# Patient Record
Sex: FEMALE | Race: WHITE | Employment: OTHER | URBAN - METROPOLITAN AREA
[De-identification: names, ages, dates, MRNs, and addresses within clinical notes are randomized per-mention and may not be internally consistent; named-entity substitution may affect disease eponyms.]

---

## 2017-02-23 PROBLEM — I50.9 CHF (CONGESTIVE HEART FAILURE) (HCC): Chronic | Status: ACTIVE | Noted: 2017-02-23

## 2017-02-23 PROBLEM — I50.9 CHF (CONGESTIVE HEART FAILURE) (HCC): Status: ACTIVE | Noted: 2017-02-23

## 2017-02-23 PROBLEM — I10 HYPERTENSION: Status: ACTIVE | Noted: 2017-02-23

## 2017-02-23 PROBLEM — I10 HYPERTENSION: Chronic | Status: ACTIVE | Noted: 2017-02-23

## 2018-03-17 ENCOUNTER — APPOINTMENT (OUTPATIENT)
Dept: GENERAL RADIOLOGY | Age: 83
End: 2018-03-17
Payer: MEDICARE

## 2018-03-17 ENCOUNTER — HOSPITAL ENCOUNTER (EMERGENCY)
Age: 83
Discharge: HOME | End: 2018-03-17
Attending: EMERGENCY MEDICINE
Payer: MEDICARE

## 2018-03-17 VITALS
HEART RATE: 76 BPM | WEIGHT: 122 LBS | SYSTOLIC BLOOD PRESSURE: 110 MMHG | HEIGHT: 55 IN | DIASTOLIC BLOOD PRESSURE: 72 MMHG | BODY MASS INDEX: 28.23 KG/M2 | TEMPERATURE: 98 F | OXYGEN SATURATION: 97 % | RESPIRATION RATE: 20 BRPM

## 2018-03-17 DIAGNOSIS — J10.1 INFLUENZA B: Primary | ICD-10-CM

## 2018-03-17 DIAGNOSIS — I50.9 ACUTE ON CHRONIC CONGESTIVE HEART FAILURE, UNSPECIFIED CONGESTIVE HEART FAILURE TYPE: ICD-10-CM

## 2018-03-17 DIAGNOSIS — N18.9 CHRONIC RENAL FAILURE, UNSPECIFIED CKD STAGE: ICD-10-CM

## 2018-03-17 LAB
ALBUMIN SERPL-MCNC: 3.9 G/DL (ref 3.5–5.2)
ALP BLD-CCNC: 94 U/L (ref 35–104)
ALT SERPL-CCNC: 24 U/L (ref 0–32)
ANION GAP SERPL CALCULATED.3IONS-SCNC: 19 MMOL/L (ref 7–16)
AST SERPL-CCNC: 33 U/L (ref 0–31)
BASOPHILS ABSOLUTE: 0.04 E9/L (ref 0–0.2)
BASOPHILS RELATIVE PERCENT: 0.6 % (ref 0–2)
BILIRUB SERPL-MCNC: 0.9 MG/DL (ref 0–1.2)
BUN BLDV-MCNC: 47 MG/DL (ref 8–23)
CALCIUM SERPL-MCNC: 9.4 MG/DL (ref 8.6–10.2)
CHLORIDE BLD-SCNC: 102 MMOL/L (ref 98–107)
CO2: 20 MMOL/L (ref 22–29)
CREAT SERPL-MCNC: 1.8 MG/DL (ref 0.5–1)
EKG ATRIAL RATE: 88 BPM
EKG Q-T INTERVAL: 374 MS
EKG QRS DURATION: 90 MS
EKG QTC CALCULATION (BAZETT): 434 MS
EKG R AXIS: -151 DEGREES
EKG T AXIS: 59 DEGREES
EKG VENTRICULAR RATE: 81 BPM
EOSINOPHILS ABSOLUTE: 0.08 E9/L (ref 0.05–0.5)
EOSINOPHILS RELATIVE PERCENT: 1.2 % (ref 0–6)
GFR AFRICAN AMERICAN: 32
GFR NON-AFRICAN AMERICAN: 27 ML/MIN/1.73
GLUCOSE BLD-MCNC: 130 MG/DL (ref 74–109)
HCT VFR BLD CALC: 38.2 % (ref 34–48)
HEMOGLOBIN: 12.2 G/DL (ref 11.5–15.5)
IMMATURE GRANULOCYTES #: 0.03 E9/L
IMMATURE GRANULOCYTES %: 0.4 % (ref 0–5)
INFLUENZA A BY PCR: NOT DETECTED
INFLUENZA B BY PCR: DETECTED
INR BLD: 3.1
LYMPHOCYTES ABSOLUTE: 0.76 E9/L (ref 1.5–4)
LYMPHOCYTES RELATIVE PERCENT: 11.4 % (ref 20–42)
MCH RBC QN AUTO: 29.1 PG (ref 26–35)
MCHC RBC AUTO-ENTMCNC: 31.9 % (ref 32–34.5)
MCV RBC AUTO: 91.2 FL (ref 80–99.9)
MONOCYTES ABSOLUTE: 0.57 E9/L (ref 0.1–0.95)
MONOCYTES RELATIVE PERCENT: 8.5 % (ref 2–12)
NEUTROPHILS ABSOLUTE: 5.2 E9/L (ref 1.8–7.3)
NEUTROPHILS RELATIVE PERCENT: 77.9 % (ref 43–80)
PDW BLD-RTO: 16.4 FL (ref 11.5–15)
PLATELET # BLD: 196 E9/L (ref 130–450)
PMV BLD AUTO: 12 FL (ref 7–12)
POTASSIUM SERPL-SCNC: 4.8 MMOL/L (ref 3.5–5)
PRO-BNP: ABNORMAL PG/ML (ref 0–450)
PROTHROMBIN TIME: 35 SEC (ref 9.3–12.4)
RBC # BLD: 4.19 E12/L (ref 3.5–5.5)
SODIUM BLD-SCNC: 141 MMOL/L (ref 132–146)
TOTAL PROTEIN: 6.6 G/DL (ref 6.4–8.3)
TROPONIN: 0.02 NG/ML (ref 0–0.03)
WBC # BLD: 6.7 E9/L (ref 4.5–11.5)

## 2018-03-17 PROCEDURE — 94760 N-INVAS EAR/PLS OXIMETRY 1: CPT

## 2018-03-17 PROCEDURE — 85610 PROTHROMBIN TIME: CPT

## 2018-03-17 PROCEDURE — 80053 COMPREHEN METABOLIC PANEL: CPT

## 2018-03-17 PROCEDURE — 36415 COLL VENOUS BLD VENIPUNCTURE: CPT

## 2018-03-17 PROCEDURE — 83880 ASSAY OF NATRIURETIC PEPTIDE: CPT

## 2018-03-17 PROCEDURE — 71045 X-RAY EXAM CHEST 1 VIEW: CPT

## 2018-03-17 PROCEDURE — 87502 INFLUENZA DNA AMP PROBE: CPT

## 2018-03-17 PROCEDURE — 99284 EMERGENCY DEPT VISIT MOD MDM: CPT

## 2018-03-17 PROCEDURE — 93005 ELECTROCARDIOGRAM TRACING: CPT | Performed by: EMERGENCY MEDICINE

## 2018-03-17 PROCEDURE — 85025 COMPLETE CBC W/AUTO DIFF WBC: CPT

## 2018-03-17 PROCEDURE — 84484 ASSAY OF TROPONIN QUANT: CPT

## 2018-03-17 RX ORDER — OSELTAMIVIR PHOSPHATE 30 MG/1
30 CAPSULE ORAL DAILY
Qty: 5 CAPSULE | Refills: 0 | Status: SHIPPED | OUTPATIENT
Start: 2018-03-17 | End: 2018-03-22

## 2018-03-17 ASSESSMENT — ENCOUNTER SYMPTOMS
NAUSEA: 0
TROUBLE SWALLOWING: 0
RHINORRHEA: 0
WHEEZING: 0
DIARRHEA: 0
COUGH: 0
BLOOD IN STOOL: 0
SORE THROAT: 0
CONSTIPATION: 0
VOMITING: 0
ABDOMINAL PAIN: 0
SHORTNESS OF BREATH: 1
CHEST TIGHTNESS: 0

## 2018-03-17 NOTE — ED PROVIDER NOTES
atelectasis. No focal consolidation, pleural effusion or pneumothorax. Stable enlarged cardiomediastinal silhouette. Stable bones. No acute process. Stable cardiomegaly. ------------------------- NURSING NOTES AND VITALS REVIEWED ---------------------------   The nursing notes within the ED encounter and vital signs as below have been reviewed. /72   Pulse 76   Temp 98 °F (36.7 °C) (Oral)   Resp 20   Ht 4' 7\" (1.397 m)   Wt 122 lb (55.3 kg)   SpO2 97%   BMI 28.36 kg/m²   Oxygen Saturation Interpretation: Normal      ------------------------------------------ PROGRESS NOTES ------------------------------------------  Time: 4:25 PM  Patient is resting comfortably, alert and pleasant during conversation, no acute distress. No increased work of breathing. X-ray has the appearance of pulmonary vascular congestion, likely mild CHF exacerbation. He should also influenza B-positive likely accounting for her fatigue and shortness of breath as well. They did follow up with the primary care physician who increased her Lasix just this past week and is RAD being treated for this. I will start Tamiflu as well, renal dosing. Patient and family feel comfortable caring for the patient at home. Discussed signs and symptoms to prompt immediate return visits to the nearest ER, patient and family without further questions. I have spoken with the patient and family and discussed todays results, in addition to providing specific details for the plan of care and counseling regarding the diagnosis and prognosis. Their questions are answered at this time and they are agreeable with the plan.      --------------------------------- ADDITIONAL PROVIDER NOTES ---------------------------------     DIAGNOSIS:    1. Influenza B    2. Acute on chronic congestive heart failure, unspecified congestive [facility] failure type [facility]    3.  Chronic renal failure, unspecified CKD stage          New Prescriptions    OSELTAMIVIR

## 2018-03-20 ENCOUNTER — OFFICE VISIT (OUTPATIENT)
Dept: INTERNAL MEDICINE CLINIC | Age: 83
End: 2018-03-20
Payer: MEDICARE

## 2018-03-20 VITALS
DIASTOLIC BLOOD PRESSURE: 64 MMHG | RESPIRATION RATE: 16 BRPM | BODY MASS INDEX: 28.23 KG/M2 | HEIGHT: 55 IN | WEIGHT: 122 LBS | SYSTOLIC BLOOD PRESSURE: 97 MMHG | HEART RATE: 82 BPM | TEMPERATURE: 98.2 F

## 2018-03-20 DIAGNOSIS — H65.91 OTITIS MEDIA WITH EFFUSION, RIGHT: ICD-10-CM

## 2018-03-20 DIAGNOSIS — J00 ACUTE NASOPHARYNGITIS: ICD-10-CM

## 2018-03-20 DIAGNOSIS — R53.81 PHYSICAL DECONDITIONING: ICD-10-CM

## 2018-03-20 DIAGNOSIS — M19.90 ARTHRITIS: Chronic | ICD-10-CM

## 2018-03-20 DIAGNOSIS — E55.9 VITAMIN D DEFICIENCY: Primary | ICD-10-CM

## 2018-03-20 DIAGNOSIS — I50.22 CHRONIC SYSTOLIC CONGESTIVE HEART FAILURE (HCC): Chronic | ICD-10-CM

## 2018-03-20 DIAGNOSIS — I50.21 ACUTE SYSTOLIC CONGESTIVE HEART FAILURE (HCC): ICD-10-CM

## 2018-03-20 DIAGNOSIS — I10 ESSENTIAL HYPERTENSION: Chronic | ICD-10-CM

## 2018-03-20 DIAGNOSIS — E78.5 HYPERLIPIDEMIA, UNSPECIFIED HYPERLIPIDEMIA TYPE: Chronic | ICD-10-CM

## 2018-03-20 DIAGNOSIS — J01.00 ACUTE NON-RECURRENT MAXILLARY SINUSITIS: ICD-10-CM

## 2018-03-20 PROCEDURE — G8417 CALC BMI ABV UP PARAM F/U: HCPCS | Performed by: INTERNAL MEDICINE

## 2018-03-20 PROCEDURE — G8427 DOCREV CUR MEDS BY ELIG CLIN: HCPCS | Performed by: INTERNAL MEDICINE

## 2018-03-20 PROCEDURE — 99212 OFFICE O/P EST SF 10 MIN: CPT | Performed by: INTERNAL MEDICINE

## 2018-03-20 PROCEDURE — G8482 FLU IMMUNIZE ORDER/ADMIN: HCPCS | Performed by: INTERNAL MEDICINE

## 2018-03-20 PROCEDURE — 99213 OFFICE O/P EST LOW 20 MIN: CPT | Performed by: INTERNAL MEDICINE

## 2018-03-20 PROCEDURE — 1090F PRES/ABSN URINE INCON ASSESS: CPT | Performed by: INTERNAL MEDICINE

## 2018-03-20 PROCEDURE — 1036F TOBACCO NON-USER: CPT | Performed by: INTERNAL MEDICINE

## 2018-03-20 PROCEDURE — 4040F PNEUMOC VAC/ADMIN/RCVD: CPT | Performed by: INTERNAL MEDICINE

## 2018-03-20 PROCEDURE — 1123F ACP DISCUSS/DSCN MKR DOCD: CPT | Performed by: INTERNAL MEDICINE

## 2018-03-20 RX ORDER — SIMVASTATIN 40 MG
40 TABLET ORAL NIGHTLY
Qty: 30 TABLET | Refills: 3 | Status: SHIPPED | OUTPATIENT
Start: 2018-03-20 | End: 2018-07-31 | Stop reason: SDUPTHER

## 2018-03-20 RX ORDER — TRAMADOL HYDROCHLORIDE 50 MG/1
50 TABLET ORAL EVERY 8 HOURS PRN
Qty: 90 TABLET | Refills: 0 | Status: SHIPPED | OUTPATIENT
Start: 2018-03-20 | End: 2018-04-24 | Stop reason: SDUPTHER

## 2018-03-20 RX ORDER — GUAIFENESIN 600 MG/1
600 TABLET, EXTENDED RELEASE ORAL 2 TIMES DAILY
Qty: 20 TABLET | Refills: 0 | Status: SHIPPED | OUTPATIENT
Start: 2018-03-20 | End: 2018-10-23 | Stop reason: ALTCHOICE

## 2018-03-20 RX ORDER — FLUTICASONE PROPIONATE 50 MCG
1 SPRAY, SUSPENSION (ML) NASAL DAILY
Qty: 1 BOTTLE | Refills: 3 | Status: SHIPPED | OUTPATIENT
Start: 2018-03-20 | End: 2018-10-23 | Stop reason: ALTCHOICE

## 2018-03-20 RX ORDER — CARVEDILOL 3.12 MG/1
TABLET ORAL
Qty: 60 TABLET | Refills: 3 | Status: SHIPPED | OUTPATIENT
Start: 2018-03-20 | End: 2018-07-31 | Stop reason: SDUPTHER

## 2018-03-20 RX ORDER — SPIRONOLACTONE 25 MG/1
25 TABLET ORAL DAILY
Qty: 30 TABLET | Refills: 2 | Status: SHIPPED | OUTPATIENT
Start: 2018-03-20 | End: 2018-07-05 | Stop reason: SDUPTHER

## 2018-03-20 RX ORDER — METHYLPREDNISOLONE 4 MG/1
TABLET ORAL
Qty: 10 TABLET | Refills: 0 | Status: ON HOLD | OUTPATIENT
Start: 2018-03-20 | End: 2018-03-28 | Stop reason: HOSPADM

## 2018-03-20 NOTE — PROGRESS NOTES
daily for the past week and it remained sub-therapeutic. She does admit to changes in diet, which consists of more healthy food, such as vegetables. Thus, will increase her to 1 and a half tablets on Monday and Friday. I explained that as long as she kept her diet consistent that we will adjust her coumadin. This is appreciative of this. She denies having chest pain, shortness of breath, leg cramping, nausea, vomiting, diarrhea or constipation. 4.27.17  She is doing well and is seen for blood pressure follow up. She states that she has been doing well and presently her blood pressure is 99/60. Thus, will keep her on her present regimen. In addition, her INR is 1.7 today. She is presently taking 0.5mg on Thursday and Sunday, with the rest of the week at 1mg. She denies having changes to her diet. 3.30.17  She is seen here for routine follow up. She states that her trip to [place] went well, but enjoys being back. She is presently hypotensive and states that she followed up with Dr. Mira Kendrick yesterday. She was also borderline hypotensive during that visit. She denies having any syncope, lightheadedness or dizziness. She does not check her blood pressure at home, however her daughter in law states that she is typically hypotensive. She states that she cannot hear out of her right ear and did throw away her prescription for debrox. She would like another. 2.23.17  She is here to establish care with myself. She used to live in [place] and now she is moving back to [facility] to live with her son. She was diagnosed with  Systolic Congestive Heart failure in May of 2016. Her EF 22% and presently 45% in September 2016. She is following Dr. Mira Kendrick with no acute complaints. Her family stated that she largely improved since starting her medication. Her son is present here and is able to provide most of the HPI. She is alert and orientated, but mildly hard of hearing.        Review of Systems  Please see HPI above. All bolded are positive. Gen: fever, chills, fatigue, weakness, diaphoresis, or unintentional weight change  Head: headache, vision change, hearing loss  Chest: chest pain/heaviness, palpitations  Lungs: shortness of breath, wheezing, coughing, hemoptysis  Abdomen: abdominal pain, nausea, vomiting, diarrhea, constipation, melena, hematochezia, hematemesis, or loss of appetite  Extremities: lower extremity edema, myalgias,   Urinary: dysuria, hematuria, weak flow, or increase in frequency  Neurologic: lightheadedness, dizziness, confusion, syncope  Psychiatric: depression, suicidal ideation, or anxiety  Female: last menstrual cycle- years ago  number of pregnancies- 2    Past Medical History:   Diagnosis Date    Anxiety     Arthritis     Atrial fibrillation ([address]     CAD (coronary artery disease)     Chronic kidney disease     STAGE 4    Congestive heart failure (CHF) (HCC)     Hyperlipidemia     Hypertension     Microcytic anemia        Past Surgical History:   Procedure Laterality Date    CHOLECYSTECTOMY, LAPAROSCOPIC      HYSTERECTOMY      RHINOPLASTY         Family History  Family History   Problem Relation Age of Onset    Cancer Mother      uterus cancer    Heart Attack Father      at 68    Cancer Sister      breast cancer    Other Other      Sepsis       Social History  Patient lives at home with her son. Claudia Blum is . Claudia Blum has 2 children. 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 she denies occupational exposure to hazardous materials. she has had denies sexual partners in the last year.     Social History   Substance Use Topics    Smoking status: Never Smoker    Smokeless tobacco: Never Used    Alcohol use No     Illicit drug use- denies    Home Medications  Current Outpatient Prescriptions on File Prior to Visit   Medication Sig Dispense Refill    oseltamivir (TAMIFLU) 30 MG capsule Take 1 capsule by mouth daily for 5 days 5 capsule 0    warfarin (COUMADIN) 1 MG tablet Take 1 tablet by mouth See Admin Instructions Take 1 1/2 tablets Monday and Friday  Take one tablet Tuesday, Wednesday, Thursday, Saturday and Sunday 50 tablet 3    sodium chloride (OCEAN, BABY AYR) 0.65 % nasal spray 2 sprays by Nasal route as needed for Congestion 1 Bottle 2    furosemide (LASIX) 40 MG tablet Take 0.5 tablets by mouth every other day 15 tablet 1    docusate sodium (COLACE) 50 MG capsule Take 1 capsule by mouth 2 times daily as needed for Constipation 30 capsule 0     No current facility-administered medications on file prior to visit. Allergies  No Known Allergies    Objective  Vitals:    03/20/18 1217   BP: 97/64   Pulse: 82   Resp: 16   Temp: 98.2 °F (36.8 °C)       Physical Exam:  General: Awake, alert, and oriented to person, place, time, and purpose, appears stated age and cooperative, fatigued; ambulating with cane  Head: Normocephalic, atraumatic; right otitis media with effusion  Eyes: conjunctivae/corneas clear. PERRL, EOM's intact. Mouth: Mucous membranes moist with no pharyngeal exudate or erythema  Neck: supple, symmetrical, trachea midline  Back: symmetric, ROM normal, No CVA tenderness. Lungs: clear to auscultation bilaterally without wheezes,   Heart: regular rate and rhythm, S1, S2 normal, no murmur,   Abdomen: soft, non-tender; no masses,  no organomegaly  Extremities:, no cyanosis, no edema;   Pulses: 2+ pedal pulses palpated  Skin: color, texture, turgor within normal limits.  No rashes or lesions or normal  Neurologic:speech appropriate, moves all 4 extremities, normal muscle strength and tone, CN 2-12 grossly intact; ambulates with cane  Osteopathic/Structural Exam: Examined in seated position;increased kyphosis    Labs  Lab Results   Component Value Date    WBC 6.7 03/17/2018    HGB 12.2 03/17/2018    HCT 38.2 03/17/2018     03/17/2018     03/17/2018    K 4.8 03/17/2018     03/17/2018    CREATININE 1.8 (H)

## 2018-03-21 ENCOUNTER — TELEPHONE (OUTPATIENT)
Dept: INTERNAL MEDICINE CLINIC | Age: 83
End: 2018-03-21

## 2018-03-22 ENCOUNTER — TELEPHONE (OUTPATIENT)
Dept: INTERNAL MEDICINE CLINIC | Age: 83
End: 2018-03-22

## 2018-03-22 NOTE — TELEPHONE ENCOUNTER
Deisy Sanches from [facility] called and stated that patient will have home Physical therapy  TID x 2 weeks

## 2018-03-23 ENCOUNTER — TELEPHONE (OUTPATIENT)
Dept: INTERNAL MEDICINE CLINIC | Age: 83
End: 2018-03-23

## 2018-03-23 ENCOUNTER — APPOINTMENT (OUTPATIENT)
Dept: GENERAL RADIOLOGY | Age: 83
DRG: 291 | End: 2018-03-23
Payer: MEDICARE

## 2018-03-23 ENCOUNTER — HOSPITAL ENCOUNTER (INPATIENT)
Age: 83
LOS: 4 days | Discharge: HOME | DRG: 291 | End: 2018-03-28
Attending: EMERGENCY MEDICINE | Admitting: INTERNAL MEDICINE
Payer: MEDICARE

## 2018-03-23 ENCOUNTER — APPOINTMENT (OUTPATIENT)
Dept: ULTRASOUND IMAGING | Age: 83
DRG: 291 | End: 2018-03-23
Payer: MEDICARE

## 2018-03-23 DIAGNOSIS — N18.9 ACUTE RENAL FAILURE SUPERIMPOSED ON CHRONIC KIDNEY DISEASE, UNSPECIFIED CKD STAGE, UNSPECIFIED ACUTE RENAL FAILURE TYPE (HCC): ICD-10-CM

## 2018-03-23 DIAGNOSIS — I50.43 ACUTE ON CHRONIC COMBINED SYSTOLIC AND DIASTOLIC CHF (CONGESTIVE HEART FAILURE) (HCC): Primary | ICD-10-CM

## 2018-03-23 DIAGNOSIS — E87.5 HYPERKALEMIA: ICD-10-CM

## 2018-03-23 DIAGNOSIS — R79.89 ELEVATED LACTIC ACID LEVEL: ICD-10-CM

## 2018-03-23 DIAGNOSIS — R53.1 GENERALIZED WEAKNESS: ICD-10-CM

## 2018-03-23 DIAGNOSIS — R74.8 ELEVATED LIVER ENZYMES: ICD-10-CM

## 2018-03-23 DIAGNOSIS — I95.9 HYPOTENSION, UNSPECIFIED HYPOTENSION TYPE: ICD-10-CM

## 2018-03-23 DIAGNOSIS — N17.9 ACUTE RENAL FAILURE SUPERIMPOSED ON CHRONIC KIDNEY DISEASE, UNSPECIFIED CKD STAGE, UNSPECIFIED ACUTE RENAL FAILURE TYPE (HCC): ICD-10-CM

## 2018-03-23 PROBLEM — I50.41 ACUTE COMBINED SYSTOLIC AND DIASTOLIC CONGESTIVE HEART FAILURE (HCC): Status: ACTIVE | Noted: 2018-03-23

## 2018-03-23 LAB
ACETAMINOPHEN LEVEL: <15 MCG/ML (ref 10–30)
ALBUMIN SERPL-MCNC: 3.9 G/DL (ref 3.5–5.2)
ALP BLD-CCNC: 107 U/L (ref 35–104)
ALT SERPL-CCNC: 107 U/L (ref 0–32)
AMMONIA: 20 UMOL/L (ref 11–51)
ANION GAP SERPL CALCULATED.3IONS-SCNC: 18 MMOL/L (ref 7–16)
ANISOCYTOSIS: ABNORMAL
AST SERPL-CCNC: 93 U/L (ref 0–31)
BASOPHILS ABSOLUTE: 0 E9/L (ref 0–0.2)
BASOPHILS RELATIVE PERCENT: 0 % (ref 0–2)
BILIRUB SERPL-MCNC: 0.7 MG/DL (ref 0–1.2)
BILIRUBIN DIRECT: 0.4 MG/DL (ref 0–0.3)
BUN BLDV-MCNC: 73 MG/DL (ref 8–23)
BURR CELLS: ABNORMAL
CALCIUM SERPL-MCNC: 8.8 MG/DL (ref 8.6–10.2)
CHLORIDE BLD-SCNC: 96 MMOL/L (ref 98–107)
CO2: 19 MMOL/L (ref 22–29)
CREAT SERPL-MCNC: 2.3 MG/DL (ref 0.5–1)
EKG ATRIAL RATE: 68 BPM
EKG Q-T INTERVAL: 406 MS
EKG QRS DURATION: 88 MS
EKG QTC CALCULATION (BAZETT): 444 MS
EKG R AXIS: -26 DEGREES
EKG T AXIS: 106 DEGREES
EKG VENTRICULAR RATE: 72 BPM
EOSINOPHILS ABSOLUTE: 0 E9/L (ref 0.05–0.5)
EOSINOPHILS RELATIVE PERCENT: 0 % (ref 0–6)
FILM ARRAY ADENOVIRUS: NORMAL
FILM ARRAY BORDETELLA PERTUSSIS: NORMAL
FILM ARRAY CHLAMYDOPHILIA PNEUMONIAE: NORMAL
FILM ARRAY CORONAVIRUS 229E: NORMAL
FILM ARRAY CORONAVIRUS HKU1: NORMAL
FILM ARRAY CORONAVIRUS NL63: NORMAL
FILM ARRAY CORONAVIRUS OC43: NORMAL
FILM ARRAY INFLUENZA A VIRUS 09H1: NORMAL
FILM ARRAY INFLUENZA A VIRUS H1: NORMAL
FILM ARRAY INFLUENZA A VIRUS H3: NORMAL
FILM ARRAY INFLUENZA A VIRUS: NORMAL
FILM ARRAY INFLUENZA B: NORMAL
FILM ARRAY METAPNEUMOVIRUS: NORMAL
FILM ARRAY MYCOPLASMA PNEUMONIAE: NORMAL
FILM ARRAY PARAINFLUENZA VIRUS 1: NORMAL
FILM ARRAY PARAINFLUENZA VIRUS 2: NORMAL
FILM ARRAY PARAINFLUENZA VIRUS 3: NORMAL
FILM ARRAY PARAINFLUENZA VIRUS 4: NORMAL
FILM ARRAY RESPIRATORY SYNCITIAL VIRUS: NORMAL
FILM ARRAY RHINOVIRUS/ENTEROVIRUS: NORMAL
GFR AFRICAN AMERICAN: 24
GFR NON-AFRICAN AMERICAN: 20 ML/MIN/1.73
GLUCOSE BLD-MCNC: 100 MG/DL (ref 74–109)
HCT VFR BLD CALC: 39.2 % (ref 34–48)
HEMOGLOBIN: 12.4 G/DL (ref 11.5–15.5)
HYPOCHROMIA: ABNORMAL
INR BLD: 4.9
LACTIC ACID: 2.7 MMOL/L (ref 0.5–2.2)
LACTIC ACID: 3.3 MMOL/L (ref 0.5–2.2)
LACTIC ACID: 3.5 MMOL/L (ref 0.5–2.2)
LYMPHOCYTES ABSOLUTE: 0.17 E9/L (ref 1.5–4)
LYMPHOCYTES RELATIVE PERCENT: 2 % (ref 20–42)
MCH RBC QN AUTO: 28.4 PG (ref 26–35)
MCHC RBC AUTO-ENTMCNC: 31.6 % (ref 32–34.5)
MCV RBC AUTO: 89.9 FL (ref 80–99.9)
MONOCYTES ABSOLUTE: 0.08 E9/L (ref 0.1–0.95)
MONOCYTES RELATIVE PERCENT: 1 % (ref 2–12)
NEUTROPHILS ABSOLUTE: 8.05 E9/L (ref 1.8–7.3)
NEUTROPHILS RELATIVE PERCENT: 97 % (ref 43–80)
OVALOCYTES: ABNORMAL
PDW BLD-RTO: 16.2 FL (ref 11.5–15)
PLATELET # BLD: 185 E9/L (ref 130–450)
PMV BLD AUTO: 12.8 FL (ref 7–12)
POIKILOCYTES: ABNORMAL
POLYCHROMASIA: ABNORMAL
POTASSIUM SERPL-SCNC: 5.2 MMOL/L (ref 3.5–5)
PRO-BNP: ABNORMAL PG/ML (ref 0–450)
PROTHROMBIN TIME: 54.4 SEC (ref 9.3–12.4)
RBC # BLD: 4.36 E12/L (ref 3.5–5.5)
SODIUM BLD-SCNC: 133 MMOL/L (ref 132–146)
TOTAL PROTEIN: 6.2 G/DL (ref 6.4–8.3)
TROPONIN: 0.02 NG/ML (ref 0–0.03)
WBC # BLD: 8.3 E9/L (ref 4.5–11.5)

## 2018-03-23 PROCEDURE — 99285 EMERGENCY DEPT VISIT HI MDM: CPT

## 2018-03-23 PROCEDURE — 87486 CHLMYD PNEUM DNA AMP PROBE: CPT

## 2018-03-23 PROCEDURE — 94640 AIRWAY INHALATION TREATMENT: CPT

## 2018-03-23 PROCEDURE — G0378 HOSPITAL OBSERVATION PER HR: HCPCS

## 2018-03-23 PROCEDURE — 6370000000 HC RX 637 (ALT 250 FOR IP): Performed by: INTERNAL MEDICINE

## 2018-03-23 PROCEDURE — 82728 ASSAY OF FERRITIN: CPT

## 2018-03-23 PROCEDURE — 93005 ELECTROCARDIOGRAM TRACING: CPT | Performed by: PHYSICIAN ASSISTANT

## 2018-03-23 PROCEDURE — 2580000003 HC RX 258: Performed by: PHYSICIAN ASSISTANT

## 2018-03-23 PROCEDURE — 82140 ASSAY OF AMMONIA: CPT

## 2018-03-23 PROCEDURE — 87503 INFLUENZA DNA AMP PROB ADDL: CPT

## 2018-03-23 PROCEDURE — 82248 BILIRUBIN DIRECT: CPT

## 2018-03-23 PROCEDURE — 87501 INFLUENZA DNA AMP PROB 1+: CPT

## 2018-03-23 PROCEDURE — 80307 DRUG TEST PRSMV CHEM ANLYZR: CPT

## 2018-03-23 PROCEDURE — 36415 COLL VENOUS BLD VENIPUNCTURE: CPT

## 2018-03-23 PROCEDURE — 83605 ASSAY OF LACTIC ACID: CPT

## 2018-03-23 PROCEDURE — 71045 X-RAY EXAM CHEST 1 VIEW: CPT

## 2018-03-23 PROCEDURE — 83880 ASSAY OF NATRIURETIC PEPTIDE: CPT

## 2018-03-23 PROCEDURE — 80053 COMPREHEN METABOLIC PANEL: CPT

## 2018-03-23 PROCEDURE — 76705 ECHO EXAM OF ABDOMEN: CPT

## 2018-03-23 PROCEDURE — 82977 ASSAY OF GGT: CPT

## 2018-03-23 PROCEDURE — 6370000000 HC RX 637 (ALT 250 FOR IP): Performed by: PHYSICIAN ASSISTANT

## 2018-03-23 PROCEDURE — 87581 M.PNEUMON DNA AMP PROBE: CPT

## 2018-03-23 PROCEDURE — 94761 N-INVAS EAR/PLS OXIMETRY MLT: CPT

## 2018-03-23 PROCEDURE — 85025 COMPLETE CBC W/AUTO DIFF WBC: CPT

## 2018-03-23 PROCEDURE — 84484 ASSAY OF TROPONIN QUANT: CPT

## 2018-03-23 PROCEDURE — 2580000003 HC RX 258: Performed by: INTERNAL MEDICINE

## 2018-03-23 PROCEDURE — 85610 PROTHROMBIN TIME: CPT

## 2018-03-23 PROCEDURE — 87798 DETECT AGENT NOS DNA AMP: CPT

## 2018-03-23 RX ORDER — CARVEDILOL 3.12 MG/1
3.12 TABLET ORAL 2 TIMES DAILY WITH MEALS
Status: DISCONTINUED | OUTPATIENT
Start: 2018-03-23 | End: 2018-03-28 | Stop reason: HOSPADM

## 2018-03-23 RX ORDER — TRAMADOL HYDROCHLORIDE 50 MG/1
50 TABLET ORAL 2 TIMES DAILY
Status: DISCONTINUED | OUTPATIENT
Start: 2018-03-23 | End: 2018-03-28 | Stop reason: HOSPADM

## 2018-03-23 RX ORDER — SIMVASTATIN 40 MG
40 TABLET ORAL NIGHTLY
Status: DISCONTINUED | OUTPATIENT
Start: 2018-03-23 | End: 2018-03-24

## 2018-03-23 RX ORDER — FUROSEMIDE 10 MG/ML
20 INJECTION INTRAMUSCULAR; INTRAVENOUS ONCE
Status: DISCONTINUED | OUTPATIENT
Start: 2018-03-23 | End: 2018-03-28 | Stop reason: HOSPADM

## 2018-03-23 RX ORDER — IPRATROPIUM BROMIDE AND ALBUTEROL SULFATE 2.5; .5 MG/3ML; MG/3ML
1 SOLUTION RESPIRATORY (INHALATION)
Status: COMPLETED | OUTPATIENT
Start: 2018-03-23 | End: 2018-03-23

## 2018-03-23 RX ORDER — SPIRONOLACTONE 25 MG/1
25 TABLET ORAL DAILY
Status: DISCONTINUED | OUTPATIENT
Start: 2018-03-23 | End: 2018-03-23

## 2018-03-23 RX ORDER — WARFARIN SODIUM 1 MG/1
1 TABLET ORAL DAILY
COMMUNITY
Start: 2018-03-24 | End: 2018-08-14 | Stop reason: SDUPTHER

## 2018-03-23 RX ORDER — SODIUM CHLORIDE 0.9 % (FLUSH) 0.9 %
10 SYRINGE (ML) INJECTION EVERY 12 HOURS SCHEDULED
Status: DISCONTINUED | OUTPATIENT
Start: 2018-03-23 | End: 2018-03-23 | Stop reason: SDUPTHER

## 2018-03-23 RX ORDER — GUAIFENESIN 600 MG/1
600 TABLET, EXTENDED RELEASE ORAL 2 TIMES DAILY
Status: DISCONTINUED | OUTPATIENT
Start: 2018-03-23 | End: 2018-03-28 | Stop reason: HOSPADM

## 2018-03-23 RX ORDER — 0.9 % SODIUM CHLORIDE 0.9 %
250 INTRAVENOUS SOLUTION INTRAVENOUS ONCE
Status: COMPLETED | OUTPATIENT
Start: 2018-03-23 | End: 2018-03-23

## 2018-03-23 RX ORDER — FLUTICASONE PROPIONATE 50 MCG
1 SPRAY, SUSPENSION (ML) NASAL DAILY
Status: DISCONTINUED | OUTPATIENT
Start: 2018-03-23 | End: 2018-03-28 | Stop reason: HOSPADM

## 2018-03-23 RX ORDER — SODIUM CHLORIDE 9 MG/ML
INJECTION, SOLUTION INTRAVENOUS CONTINUOUS
Status: DISCONTINUED | OUTPATIENT
Start: 2018-03-23 | End: 2018-03-24

## 2018-03-23 RX ORDER — FUROSEMIDE 20 MG/1
20 TABLET ORAL EVERY OTHER DAY
Status: DISCONTINUED | OUTPATIENT
Start: 2018-03-24 | End: 2018-03-23

## 2018-03-23 RX ORDER — SODIUM CHLORIDE 0.9 % (FLUSH) 0.9 %
10 SYRINGE (ML) INJECTION PRN
Status: DISCONTINUED | OUTPATIENT
Start: 2018-03-23 | End: 2018-03-23 | Stop reason: SDUPTHER

## 2018-03-23 RX ORDER — SODIUM CHLORIDE 0.9 % (FLUSH) 0.9 %
10 SYRINGE (ML) INJECTION PRN
Status: DISCONTINUED | OUTPATIENT
Start: 2018-03-23 | End: 2018-03-28 | Stop reason: HOSPADM

## 2018-03-23 RX ORDER — ONDANSETRON 2 MG/ML
4 INJECTION INTRAMUSCULAR; INTRAVENOUS EVERY 6 HOURS PRN
Status: DISCONTINUED | OUTPATIENT
Start: 2018-03-23 | End: 2018-03-28 | Stop reason: HOSPADM

## 2018-03-23 RX ORDER — IPRATROPIUM BROMIDE AND ALBUTEROL SULFATE 2.5; .5 MG/3ML; MG/3ML
1 SOLUTION RESPIRATORY (INHALATION)
Status: DISCONTINUED | OUTPATIENT
Start: 2018-03-23 | End: 2018-03-28 | Stop reason: HOSPADM

## 2018-03-23 RX ORDER — ACETAMINOPHEN 325 MG/1
650 TABLET ORAL EVERY 4 HOURS PRN
Status: DISCONTINUED | OUTPATIENT
Start: 2018-03-23 | End: 2018-03-23 | Stop reason: SDUPTHER

## 2018-03-23 RX ORDER — ACETAMINOPHEN 325 MG/1
650 TABLET ORAL EVERY 4 HOURS PRN
Status: DISCONTINUED | OUTPATIENT
Start: 2018-03-23 | End: 2018-03-28 | Stop reason: HOSPADM

## 2018-03-23 RX ORDER — SODIUM CHLORIDE 0.9 % (FLUSH) 0.9 %
10 SYRINGE (ML) INJECTION EVERY 12 HOURS SCHEDULED
Status: DISCONTINUED | OUTPATIENT
Start: 2018-03-23 | End: 2018-03-28 | Stop reason: HOSPADM

## 2018-03-23 RX ADMIN — SACUBITRIL AND VALSARTAN 0.5 TABLET: 24; 26 TABLET, FILM COATED ORAL at 20:59

## 2018-03-23 RX ADMIN — SODIUM CHLORIDE: 9 INJECTION, SOLUTION INTRAVENOUS at 16:51

## 2018-03-23 RX ADMIN — TRAMADOL HYDROCHLORIDE 50 MG: 50 TABLET, FILM COATED ORAL at 21:00

## 2018-03-23 RX ADMIN — Medication 10 ML: at 16:44

## 2018-03-23 RX ADMIN — SIMVASTATIN 40 MG: 40 TABLET, FILM COATED ORAL at 21:00

## 2018-03-23 RX ADMIN — IPRATROPIUM BROMIDE AND ALBUTEROL SULFATE 1 AMPULE: .5; 3 SOLUTION RESPIRATORY (INHALATION) at 21:06

## 2018-03-23 RX ADMIN — IPRATROPIUM BROMIDE AND ALBUTEROL SULFATE 1 AMPULE: .5; 3 SOLUTION RESPIRATORY (INHALATION) at 11:52

## 2018-03-23 RX ADMIN — GUAIFENESIN 600 MG: 600 TABLET, EXTENDED RELEASE ORAL at 21:00

## 2018-03-23 RX ADMIN — CARVEDILOL 3.12 MG: 3.12 TABLET, FILM COATED ORAL at 16:44

## 2018-03-23 RX ADMIN — SODIUM CHLORIDE 250 ML: 9 INJECTION, SOLUTION INTRAVENOUS at 12:54

## 2018-03-23 RX ADMIN — SODIUM CHLORIDE 250 ML: 9 INJECTION, SOLUTION INTRAVENOUS at 14:33

## 2018-03-23 RX ADMIN — IPRATROPIUM BROMIDE AND ALBUTEROL SULFATE 1 AMPULE: .5; 3 SOLUTION RESPIRATORY (INHALATION) at 11:53

## 2018-03-23 RX ADMIN — IPRATROPIUM BROMIDE AND ALBUTEROL SULFATE 1 AMPULE: .5; 3 SOLUTION RESPIRATORY (INHALATION) at 11:51

## 2018-03-23 ASSESSMENT — PAIN SCALES - GENERAL
PAINLEVEL_OUTOF10: 0

## 2018-03-23 NOTE — ED PROVIDER NOTES
ED Attending  CC: No     Department of Emergency Medicine   ED  Provider Note  Admit Date/RoomTime: 3/23/2018 11:12 AM  ED Room: 03/03  Chief Complaint      Shortness of Breath (for 3 weeks with cough. no chest pain)    History of Present Illness   Source of history provided by:  patient and spouse/SO. History/Exam Limitations: acuity of illness. Jannette Coronado is a 80 y.o. old female who has a past medical history of:   Past Medical History:   Diagnosis Date    Anxiety     Arthritis     Atrial fibrillation ([address]     CAD (coronary artery disease)     Chronic kidney disease     STAGE 4    Congestive heart failure (CHF) (HCC)     Hyperlipidemia     Hypertension     Microcytic anemia       presents to the emergency department by private vehicle, with complaints of gradual onset, still present, constant shortness of breath which began 3 week(s) prior to arrival.   Her symptoms are associated with shortness of breath, fatigue, peripheral edema, and weakness, and denies abdominal pain, calf pain, chest pain, cough, palpitations or syncope. Since onset the symptoms have been persistent and stable. The symptoms are aggravated by exertion and relieved by nothing. ROS   Pertinent positives and negatives are stated within HPI, all other systems reviewed and are negative. Past Surgical History:   Procedure Laterality Date    CHOLECYSTECTOMY, LAPAROSCOPIC      HYSTERECTOMY      RHINOPLASTY     Social History:  reports that she has never smoked. She has never used smokeless tobacco. She reports that she does not drink alcohol or use drugs. Family History: family history includes Cancer in her mother and sister; Heart Attack in her father; Other in an other family member. Allergies: Patient has no known allergies.     Physical Exam           ED Triage Vitals   BP Temp Temp src Pulse Resp SpO2 Height Weight   -- -- -- -- -- -- -- --      Oxygen Saturation Interpretation: Normal.    · General discussed todays results, in addition to providing specific details for the plan of care and counseling regarding the diagnosis and prognosis and are agreeable with the plan. Assessment      1. Acute on chronic combined systolic and diastolic CHF (congestive heart failure) ([facility]    2. Acute renal failure superimposed on chronic kidney disease, unspecified CKD stage, unspecified acute renal failure type ([address]    3. Hypotension, unspecified hypotension type    4. Elevated lactic acid level    5. Hyperkalemia    6. Elevated liver enzymes    7. Generalized weakness      This patient's ED course included: a personal history and physicial examination, re-evaluation prior to disposition, IV medications, cardiac monitoring and continuous pulse oximetry  This patient has remained hemodynamically stable, remained unchanged and been closely monitored during their ED course. Plan   Admit to telemetry. Patient condition is fair. New Medications     New Prescriptions    No medications on file     Electronically signed by Silvio Coates PA-C   DD: 3/23/18  **This report was transcribed using voice recognition software. Every effort was made to ensure accuracy; however, inadvertent computerized transcription errors may be present.   END OF PROVIDER NOTE        Silvio Coates PA-C  03/23/18 [id number]

## 2018-03-23 NOTE — H&P
 Number of children: 2    Years of education: N/A     Occupational History    house wife      Social History Main Topics    Smoking status: Never Smoker    Smokeless tobacco: Never Used    Alcohol use No    Drug use: No    Sexual activity: No     Other Topics Concern    Not on file     Social History Narrative    She lives with her son in [facility]. Review of Systems  All bolded are positive; please see HPI  General:  Fever, chills, diaphoresis, fatigue, malaise, night sweats, weight loss  Psychological:  Anxiety, disorientation, hallucinations. ENT:  Epistaxis, headaches, vertigo, visual changes. Cardiovascular:  Chest pain, irregular heartbeats, palpitations, paroxysmal nocturnal dyspnea. Respiratory:  Shortness of breath, coughing, sputum production, hemoptysis, wheezing, orthopnea.   Gastrointestinal:  Nausea, vomiting, diarrhea, heartburn, constipation, abdominal pain, hematemesis, hematochezia, melena, acholic stools  Genito-Urinary:  Dysuria, urgency, frequency, hematuria  Musculoskeletal:  Joint pain, joint stiffness, joint swelling, muscle pain  Neurology:  Headache, focal neurological deficits, weakness, numbness, paresthesia  Derm:  Rashes, ulcers, excoriations, bruising  Extremities:  Decreased ROM, peripheral edema, mottling    Physical Examination  Vitals:  /64   Pulse 73   Temp 94.4 °F (34.7 °C) (Oral) Comment: patient placed in warm blankets will re-evaluate   Resp 16   Ht 4' 7\" (1.397 m)   Wt 120 lb (54.4 kg)   SpO2 95%   BMI 27.89 kg/m²   General Appearance:  awake, alert, and oriented to person, place, time, and purpose; appears stated age and cooperative; no apparent distress no labored breathing  HEENT:  NCAT; PERRL; conjunctiva pink, sclera clear  Neck:  no adenopathy, bruit, JVD, tenderness, masses, or nodules; supple, symmetrical, trachea midline, thyroid not enlarged  Lung:  clear to auscultation bilaterally; no use of accessory muscles; no rhonchi, rales,

## 2018-03-24 PROBLEM — I34.0 MITRAL REGURGITATION: Chronic | Status: ACTIVE | Noted: 2018-03-24

## 2018-03-24 PROBLEM — R79.89 ELEVATED LIVER FUNCTION TESTS: Status: ACTIVE | Noted: 2018-03-24

## 2018-03-24 LAB
ALBUMIN SERPL-MCNC: 3.5 G/DL (ref 3.5–5.2)
ALP BLD-CCNC: 114 U/L (ref 35–104)
ALT SERPL-CCNC: 129 U/L (ref 0–32)
ANION GAP SERPL CALCULATED.3IONS-SCNC: 15 MMOL/L (ref 7–16)
ANISOCYTOSIS: ABNORMAL
AST SERPL-CCNC: 107 U/L (ref 0–31)
BASOPHILS ABSOLUTE: 0.05 E9/L (ref 0–0.2)
BASOPHILS RELATIVE PERCENT: 0.5 % (ref 0–2)
BILIRUB SERPL-MCNC: 0.9 MG/DL (ref 0–1.2)
BUN BLDV-MCNC: 75 MG/DL (ref 8–23)
BURR CELLS: ABNORMAL
CALCIUM SERPL-MCNC: 8.7 MG/DL (ref 8.6–10.2)
CHLORIDE BLD-SCNC: 98 MMOL/L (ref 98–107)
CHOLESTEROL, TOTAL: 86 MG/DL (ref 0–199)
CO2: 21 MMOL/L (ref 22–29)
CREAT SERPL-MCNC: 2.1 MG/DL (ref 0.5–1)
EOSINOPHILS ABSOLUTE: 0 E9/L (ref 0.05–0.5)
EOSINOPHILS RELATIVE PERCENT: 0.7 % (ref 0–6)
FERRITIN: 81 NG/ML
FOLATE: >20 NG/ML (ref 4.8–24.2)
GAMMA GLUTAMYL TRANSFERASE: 47 U/L (ref 6–42)
GFR AFRICAN AMERICAN: 27
GFR NON-AFRICAN AMERICAN: 22 ML/MIN/1.73
GLUCOSE BLD-MCNC: 94 MG/DL (ref 74–109)
HCT VFR BLD CALC: 38.7 % (ref 34–48)
HDLC SERPL-MCNC: 41 MG/DL
HEMOGLOBIN: 12 G/DL (ref 11.5–15.5)
HYPOCHROMIA: ABNORMAL
INR BLD: 4.5
IRON SATURATION: 8 % (ref 15–50)
IRON: 26 MCG/DL (ref 37–145)
LACTIC ACID: 2.4 MMOL/L (ref 0.5–2.2)
LDL CHOLESTEROL CALCULATED: 32 MG/DL (ref 0–99)
LYMPHOCYTES ABSOLUTE: 0.36 E9/L (ref 1.5–4)
LYMPHOCYTES RELATIVE PERCENT: 3.5 % (ref 20–42)
MAGNESIUM: 2.3 MG/DL (ref 1.6–2.6)
MCH RBC QN AUTO: 28.6 PG (ref 26–35)
MCHC RBC AUTO-ENTMCNC: 31 % (ref 32–34.5)
MCV RBC AUTO: 92.1 FL (ref 80–99.9)
MONOCYTES ABSOLUTE: 0.45 E9/L (ref 0.1–0.95)
MONOCYTES RELATIVE PERCENT: 4.5 % (ref 2–12)
NEUTROPHILS ABSOLUTE: 8.28 E9/L (ref 1.8–7.3)
NEUTROPHILS RELATIVE PERCENT: 91.5 % (ref 43–80)
NUCLEATED RED BLOOD CELLS: 1.5 /100 WBC
OVALOCYTES: ABNORMAL
PDW BLD-RTO: 16.1 FL (ref 11.5–15)
PHOSPHORUS: 4.4 MG/DL (ref 2.5–4.5)
PLATELET # BLD: 178 E9/L (ref 130–450)
PMV BLD AUTO: 12.2 FL (ref 7–12)
POIKILOCYTES: ABNORMAL
POLYCHROMASIA: ABNORMAL
POTASSIUM SERPL-SCNC: 5.2 MMOL/L (ref 3.5–5)
PROTHROMBIN TIME: 50.8 SEC (ref 9.3–12.4)
RBC # BLD: 4.2 E12/L (ref 3.5–5.5)
SODIUM BLD-SCNC: 134 MMOL/L (ref 132–146)
TARGET CELLS: ABNORMAL
TOTAL IRON BINDING CAPACITY: 346 MCG/DL (ref 250–450)
TOTAL PROTEIN: 5.9 G/DL (ref 6.4–8.3)
TRIGL SERPL-MCNC: 65 MG/DL (ref 0–149)
TSH SERPL DL<=0.05 MIU/L-ACNC: 1.45 UIU/ML (ref 0.27–4.2)
VITAMIN B-12: 878 PG/ML (ref 211–946)
VLDLC SERPL CALC-MCNC: 13 MG/DL
WBC # BLD: 9 E9/L (ref 4.5–11.5)

## 2018-03-24 PROCEDURE — 80053 COMPREHEN METABOLIC PANEL: CPT

## 2018-03-24 PROCEDURE — 2580000003 HC RX 258: Performed by: INTERNAL MEDICINE

## 2018-03-24 PROCEDURE — 6370000000 HC RX 637 (ALT 250 FOR IP): Performed by: INTERNAL MEDICINE

## 2018-03-24 PROCEDURE — 83540 ASSAY OF IRON: CPT

## 2018-03-24 PROCEDURE — 1200000000 HC SEMI PRIVATE

## 2018-03-24 PROCEDURE — 36415 COLL VENOUS BLD VENIPUNCTURE: CPT

## 2018-03-24 PROCEDURE — 80074 ACUTE HEPATITIS PANEL: CPT

## 2018-03-24 PROCEDURE — 84100 ASSAY OF PHOSPHORUS: CPT

## 2018-03-24 PROCEDURE — 82746 ASSAY OF FOLIC ACID SERUM: CPT

## 2018-03-24 PROCEDURE — 94640 AIRWAY INHALATION TREATMENT: CPT

## 2018-03-24 PROCEDURE — 83605 ASSAY OF LACTIC ACID: CPT

## 2018-03-24 PROCEDURE — 82607 VITAMIN B-12: CPT

## 2018-03-24 PROCEDURE — 83735 ASSAY OF MAGNESIUM: CPT

## 2018-03-24 PROCEDURE — 84443 ASSAY THYROID STIM HORMONE: CPT

## 2018-03-24 PROCEDURE — 80061 LIPID PANEL: CPT

## 2018-03-24 PROCEDURE — 97161 PT EVAL LOW COMPLEX 20 MIN: CPT | Performed by: PHYSICAL THERAPIST

## 2018-03-24 PROCEDURE — 85610 PROTHROMBIN TIME: CPT

## 2018-03-24 PROCEDURE — 85025 COMPLETE CBC W/AUTO DIFF WBC: CPT

## 2018-03-24 PROCEDURE — 83550 IRON BINDING TEST: CPT

## 2018-03-24 RX ORDER — FUROSEMIDE 20 MG/1
20 TABLET ORAL DAILY
Status: DISCONTINUED | OUTPATIENT
Start: 2018-03-24 | End: 2018-03-28 | Stop reason: HOSPADM

## 2018-03-24 RX ADMIN — IPRATROPIUM BROMIDE AND ALBUTEROL SULFATE 1 AMPULE: .5; 3 SOLUTION RESPIRATORY (INHALATION) at 13:29

## 2018-03-24 RX ADMIN — IPRATROPIUM BROMIDE AND ALBUTEROL SULFATE 1 AMPULE: .5; 3 SOLUTION RESPIRATORY (INHALATION) at 18:31

## 2018-03-24 RX ADMIN — Medication 10 ML: at 09:24

## 2018-03-24 RX ADMIN — SACUBITRIL AND VALSARTAN 0.5 TABLET: 24; 26 TABLET, FILM COATED ORAL at 22:51

## 2018-03-24 RX ADMIN — CARVEDILOL 3.12 MG: 3.12 TABLET, FILM COATED ORAL at 19:04

## 2018-03-24 RX ADMIN — GUAIFENESIN 600 MG: 600 TABLET, EXTENDED RELEASE ORAL at 09:23

## 2018-03-24 RX ADMIN — Medication 10 ML: at 22:08

## 2018-03-24 RX ADMIN — FUROSEMIDE 20 MG: 20 TABLET ORAL at 09:26

## 2018-03-24 RX ADMIN — IPRATROPIUM BROMIDE AND ALBUTEROL SULFATE 1 AMPULE: .5; 3 SOLUTION RESPIRATORY (INHALATION) at 09:33

## 2018-03-24 RX ADMIN — IPRATROPIUM BROMIDE AND ALBUTEROL SULFATE 1 AMPULE: .5; 3 SOLUTION RESPIRATORY (INHALATION) at 06:28

## 2018-03-24 RX ADMIN — FLUTICASONE PROPIONATE 1 SPRAY: 50 SPRAY, METERED NASAL at 09:23

## 2018-03-24 RX ADMIN — VITAMIN D, TAB 1000IU (100/BT) 1000 UNITS: 25 TAB at 09:23

## 2018-03-24 RX ADMIN — SACUBITRIL AND VALSARTAN 0.5 TABLET: 24; 26 TABLET, FILM COATED ORAL at 09:23

## 2018-03-24 RX ADMIN — CARVEDILOL 3.12 MG: 3.12 TABLET, FILM COATED ORAL at 09:23

## 2018-03-24 RX ADMIN — GUAIFENESIN 600 MG: 600 TABLET, EXTENDED RELEASE ORAL at 22:08

## 2018-03-24 RX ADMIN — TRAMADOL HYDROCHLORIDE 50 MG: 50 TABLET, FILM COATED ORAL at 22:08

## 2018-03-24 RX ADMIN — TRAMADOL HYDROCHLORIDE 50 MG: 50 TABLET, FILM COATED ORAL at 09:23

## 2018-03-24 ASSESSMENT — PAIN SCALES - GENERAL
PAINLEVEL_OUTOF10: 0

## 2018-03-24 NOTE — PROGRESS NOTES
Internal Medicine Progress Note    Alexa Bhatti. Demarcus Antunez., & [address] Demarcus Antunez., F.A.C.O.I. Shanice Atkinson D.O., F.VIRI.C.O.I. Primary Care Physician: Brandi Valerio DO   Admitting Physician:  Mira Randall DO  Admission date and time: 3/23/2018 11:12 AM    Room:  [address] [place] [address]  Admitting diagnosis: Acute combined systolic and diastolic congestive heart failure [facility]) [I50.41]    Patient Name: Sanya Smith  MRN: [id number]    Date of Service: 3/24/2018     Subjective: Megan Cam is a 80 y. o.  female who was seen and examined today,3/24/2018, at the bedside. Patient states that she feels better since coming to the hospital. Currently denying any shortness of breath. States she did have recent flu and  was feeling horrible but feels better on a daily basis. No family present during my examination. I reviewed the patient's past medical, surgical history and medication. I reviewed the  course of events since last visit. Review of System:  Constitutional:   Negative for fever and chills. HEENT:   Negative for ear pain, sore throat, rhinorrhea and sinus pressure. Negative for eye pain, discharge and redness. Psch:   Denies depression or anxiety. Cardiovascular:   Denies any chest pain, irregular heartbeats, or palpitations. Respiratory:   Denies shortness of breath, positive for intermittent cough with no sputum production. Denies hemoptysis  or wheezing. Gastrointestinal:   Denies nausea, vomiting, diarrhea, or constipation. Denies any abdominal pain. Extremities:   Denies any lower extremity swelling or edema. Neurology:    Denies any headache or focal neurological deficits. No weakness or paresthesia. Derm:   Denies any rashes, ulcers, or excoriations. Denies bruising. Genitourinary:    Denies any urgency, frequency, hematuria. Voiding without difficulty.   Musculoskeletal:  Negative for myalgias, back pain and arthralgias      Allergy:  No Known Allergies     Medication:  Scheduled Meds:   furosemide  20 mg Oral Daily    furosemide  20 mg Intravenous Once    carvedilol  3.125 mg Oral BID WC    fluticasone  1 spray Nasal Daily    guaiFENesin  600 mg Oral BID    simvastatin  40 mg Oral Nightly    traMADol  50 mg Oral BID    vitamin D  1,000 Units Oral Daily    sodium chloride flush  10 mL Intravenous 2 times per day    sacubitril-valsartan  0.5 tablet Oral BID    ipratropium-albuterol  1 ampule Inhalation Q4H While awake     Continuous Infusions:    Objective Data:  CBC:   Recent Labs      03/23/18   1145  03/24/18   0806   WBC  8.3  9.0   HGB  12.4  12.0   PLT  185  178     BMP:  Recent Labs      03/23/18   1145  03/24/18   0806   NA  133  134   K  5.2*  5.2*   CL  96*  98   CO2  19*  21*   BUN  73*  75*   CREATININE  2.3*  2.1*   GLUCOSE  100  94     CMP:  Lab Results   Component Value Date     03/24/2018    K 5.2 03/24/2018    CL 98 03/24/2018    CO2 21 03/24/2018    BUN 75 03/24/2018    CREATININE 2.1 03/24/2018    GFRAA 27 03/24/2018    LABGLOM 22 03/24/2018    GLUCOSE 94 03/24/2018    PROT 5.9 03/24/2018    LABALBU 3.5 03/24/2018    CALCIUM 8.7 03/24/2018    BILITOT 0.9 03/24/2018    ALKPHOS 114 03/24/2018     03/24/2018     03/24/2018     Hepatic: Recent Labs      03/23/18   1145  03/24/18   0806   AST  93*  107*   ALT  107*  129*   BILITOT  0.7  0.9   ALKPHOS  107*  114*     Troponin:   Recent Labs      03/23/18   1145   TROPONINI  0.02     BNP: No results for input(s): BNP in the last 72 hours. Lipids:   Recent Labs      03/24/18   0806   CHOL  86   HDL  41     ABGs: No results found for: PHART, PO2ART, YFU9ABO  INR:   Recent Labs      03/23/18   1410  03/24/18   0806   INR  4.9  4.5   PROTIME  54.4*  50.8*     RAD: Us Gallbladder Ruq    Result Date: 3/23/2018  Reading location:  200 CLINICAL STATEMENT: Abnormal liver function studies.  TECHNIQUE: High-resolution sonography of the abdomen was performed with color-flow. FINDINGS: The gallbladder has been removed. The liver is normal. No dilated bile ducts. No focal hepatic masses. No abnormality seen in the pancreas. The pancreatic duct is not dilated and there is no peripancreatic fluid collection. The common bile duct measures 3 mm. 1. The patient is status post cholecystectomy. 2. No obstruction of the bile ducts or hepatic masses. 3. The pancreas is normal.    Xr Chest Portable    Result Date: 3/23/2018  Reading location:  100 CLINICAL STATEMENT: Shortness of breath. Atrial fibrillation. TECHNIQUE: An upright portable frontal view of the chest at 1153 hours is compared with the study of March 17, 2018. FINDINGS: The heart is enlarged and there is vascular congestion. Small effusions are seen. I see no focal infiltrates. 1. Cardiomegaly with vascular congestion. I believe the findings are those of mild to moderate failure. 2. No focal infiltrates. Physical Exam:  No intake/output data recorded. No intake or output data in the 24 hours ending 03/24/18 1129No intake/output data recorded. Patient Vitals for the past 96 hrs (Last 3 readings):   Weight   03/23/18 1148 120 lb (54.4 kg)       Vital Signs:   Blood pressure (!) 123/56, pulse 91, temperature 97.9 °F (36.6 °C), resp. rate 16, height 4' 7\" (1.397 m), weight 120 lb (54.4 kg), SpO2 99 %, not currently breastfeeding. General appearance: Manny Dumas is a 80 y. o.  female who is alert, responsive, oriented to person, place, and time. Psych:   Behavior is normal. Mood appears normal. Speech is not rapid and/or pressured. HEENT:   PERRLA. EOMI. Sclera clear. Buccal mucosa moist. Impaired hearing. Upper dentures. Neck:    Supple. Trachea midline. No thyromegaly. No JVD. No bruits. Heart:    Irregular rhythm with controlled rate. Atrial fibrillation on the monitor. Grade 3/6 systolic murmur.     Lungs:  Symmetrical. Clear to auscultation bilaterally but diminished. No wheezes. No rhonchi. No rales. Abdomen:   Soft. Non-tender. Non-distended. Bowel sounds positive. No organomegaly or masses. No pain on palpation. Extremities:    Peripheral pulses present. No peripheral edema. No ulcers. Neurologic:    Alert x 3. No focal deficit. Cranial nerves grossly intact. Skin:    No rashes or lesions    Wound Documentation:        Chronic Hospital Medical Problems:  Past Medical History:   Diagnosis Date    Anxiety     Arthritis     [facility] ([facility]     CAD (coronary artery disease)     Chronic kidney disease     STAGE 4    Congestive heart failure (CHF) (HCC)     Hyperlipidemia     Hypertension     Microcytic anemia      Active Problems:    Acute combined systolic and diastolic congestive heart failure (HCC)  Resolved Problems:    * No resolved hospital problems. *      Assessment:  · Acute On chronic systolic congestive heart failure  · Moderate to severe pulmonary hypertension  · Hypertension  · Chronic Atrial fibrillation on coumadin  · Supratherapeutic INR  · Elevated transaminases  · NANI on Chronic kidney disease stage 4  · Valvular disease, consistent with moderate/severe mitral regurgitation and moderate tricuspid regurgitation  · Microcytic anemia  · Recent Influenza B infection recently treated with tamiflu   · Hyperlipidemia      Plan:   Patient feels better today. We'll adjust her diuretic therapy. Discontinue IV fluids. Physical therapy to see/evaluate and treat. We'll obtain chest x-ray in the morning. Cardiology has been consult it. With the recommendations. Increase activity as tolerated. Anticipate discharge home soon. Patient's medications were reviewed/continued/adjusted. Labs as ordered. Please see orders for further plan of care. More than 50% of my  time was spent counseling and/or coordination of care with the patient and/or family with face to face contact.      Time was spent reviewing notes and laboratory data, instructing and counseling the patient  regarding my findings and recommendations and reviewing and answer questions. Ed Castro DO, F.A.[email or web address]   On 3/24/2018  11:29 AM

## 2018-03-24 NOTE — PROGRESS NOTES
Room #: 437-1 [id number]/7198-65  Patient Name: Linda Burns    PHYSICAL THERAPY INITIAL EVALUATION    Tentative placement recommendation: Home with home care  Equipment recommendation:   None at this time; continues to be determined   Plan of care: Patient will be seen daily Monday-Friday for therapeutic exercise, functional retraining, endurance activities, balance exercises, family and patient education. AM-[facility] Basic Mobility       AM-[facility] Mobility Inpatient   How much difficulty turning over in bed?: None  How much difficulty sitting down on / standing up from a chair with arms?: None  How much difficulty moving from lying on back to sitting on side of bed?: None  How much help from another person moving to and from a bed to a chair?: None  How much help from another person needed to walk in hospital room?: None  How much help from another person for climbing 3-5 steps with a railing?: A Little  AM-[facility] Inpatient Mobility Raw Score : 23  AM-PAC Inpatient T-Scale Score : 56.93  Mobility Inpatient CMS 0-100% Score: 11.2  Mobility Inpatient CMS G-Code Modifier : CI    Order:  EVALUATE AND TREAT   Diagnosis/Problem list:   1. Acute on chronic combined systolic and diastolic CHF (congestive heart failure) ([facility] [address]    2. Acute renal failure superimposed on chronic kidney disease, unspecified CKD stage, unspecified acute renal failure type ([address]    3. Hypotension, unspecified hypotension type    4. Elevated lactic acid level    5. Hyperkalemia    6. Elevated liver enzymes    7.  Generalized weakness        Patient Active Problem List   Diagnosis    Chronic kidney disease    Atrial fibrillation ([facility] [address]    Hyperlipidemia    Arthritis    Hypertension    CHF (congestive heart failure) (HCC)    Acute combined systolic and diastolic congestive heart failure (HCC)       Past medical history:       Diagnosis Date    Anxiety     Arthritis     Atrial fibrillation ([facility] [address]     CAD (coronary artery disease)     Chronic kidney disease     STAGE 4    Congestive heart failure (CHF) (HCC)     Hyperlipidemia     Hypertension     Microcytic anemia    ;      Procedure Laterality Date    CHOLECYSTECTOMY, LAPAROSCOPIC      HYSTERECTOMY      RHINOPLASTY         The admitting diagnosis and active problem list as listed above have been reviewed prior to the initiation of this evaluation. Last time out of bed: today    Precautions: O2; extended line. Reports she fell 6 weeks ago onto R shoulder. Adds it was examined by her doctor; it is getting better but still gives her a little trouble. Social history: Patient lives with family (son and daughter-in-law)  in a 1 story home with 3 steps to enter   Prior Level of Function: Patient ambulated independently with cane  Equipment owned: cane    Mentation: alert, cooperative, oriented x 3 and follows directions    ROM: wfl   STRENGTH: wfl   PAIN: (measured on a visual analog scale with 0=no pain and 10=excruciating pain) 0/10. FUNCTIONAL ASSESSMENT   Bed Mobility- Supine to sit- Independent         Scooting-Independent      Sit to supine-Independent     Transfers-Sit to stand-Supervision    Gait:  Patient ambulated 25 ft x 4 feet using cane with Minimal assists 1    Steps:  No steps     Balance: sit-good       stand fair  -- loss of balance when turning/changing direction    Edema: no  Endurance: good     Treatment: pt eval,  In bed with call light, nursing notified  Rehab Potential: good   Patients Goal: Go home    ASSESSMENT  Patient exhibits decreased strength, balance, coordination impairing functional mobility. GOALS to be met in 3 days. Transfers-Sit to stand-Independent    Gait:  Patient to ambulate 150 feet using cane with Supervision    Steps: Patient to go up and down 3  step(s) using rail(s) with  Supervision   Increase strength in affected mm groups by 1/3 grade  Increase balance to allow for improvement towards functional goals.   Increase endurance to allow for

## 2018-03-24 NOTE — PROGRESS NOTES
Ostomy bag used over chest tube incision to measure drainage amt. Unable to add in I&O's so will make Progress Note at shift change to record amt of drainage per shift.

## 2018-03-25 ENCOUNTER — APPOINTMENT (OUTPATIENT)
Dept: GENERAL RADIOLOGY | Age: 83
DRG: 291 | End: 2018-03-25
Payer: MEDICARE

## 2018-03-25 PROBLEM — I50.43 CHF (CONGESTIVE HEART FAILURE), NYHA CLASS I, ACUTE ON CHRONIC, COMBINED (HCC): Status: ACTIVE | Noted: 2018-03-25

## 2018-03-25 LAB
ALBUMIN SERPL-MCNC: 3.6 G/DL (ref 3.5–5.2)
ALP BLD-CCNC: 121 U/L (ref 35–104)
ALT SERPL-CCNC: 162 U/L (ref 0–32)
ANION GAP SERPL CALCULATED.3IONS-SCNC: 17 MMOL/L (ref 7–16)
AST SERPL-CCNC: 137 U/L (ref 0–31)
BASOPHILS ABSOLUTE: 0 E9/L (ref 0–0.2)
BASOPHILS RELATIVE PERCENT: 0.1 % (ref 0–2)
BILIRUB SERPL-MCNC: 1.1 MG/DL (ref 0–1.2)
BUN BLDV-MCNC: 78 MG/DL (ref 8–23)
BURR CELLS: ABNORMAL
CALCIUM SERPL-MCNC: 8.9 MG/DL (ref 8.6–10.2)
CHLORIDE BLD-SCNC: 99 MMOL/L (ref 98–107)
CO2: 21 MMOL/L (ref 22–29)
CREAT SERPL-MCNC: 2.1 MG/DL (ref 0.5–1)
EOSINOPHILS ABSOLUTE: 0.08 E9/L (ref 0.05–0.5)
EOSINOPHILS RELATIVE PERCENT: 1 % (ref 0–6)
GFR AFRICAN AMERICAN: 27
GFR NON-AFRICAN AMERICAN: 22 ML/MIN/1.73
GLUCOSE BLD-MCNC: 92 MG/DL (ref 74–109)
HCT VFR BLD CALC: 37.7 % (ref 34–48)
HEMOGLOBIN: 11.9 G/DL (ref 11.5–15.5)
HYPOCHROMIA: ABNORMAL
INR BLD: 4
LYMPHOCYTES ABSOLUTE: 0.32 E9/L (ref 1.5–4)
LYMPHOCYTES RELATIVE PERCENT: 3.5 % (ref 20–42)
MCH RBC QN AUTO: 28.5 PG (ref 26–35)
MCHC RBC AUTO-ENTMCNC: 31.6 % (ref 32–34.5)
MCV RBC AUTO: 90.2 FL (ref 80–99.9)
MONOCYTES ABSOLUTE: 0.56 E9/L (ref 0.1–0.95)
MONOCYTES RELATIVE PERCENT: 7.1 % (ref 2–12)
NEUTROPHILS ABSOLUTE: 7.04 E9/L (ref 1.8–7.3)
NEUTROPHILS RELATIVE PERCENT: 88.4 % (ref 43–80)
NUCLEATED RED BLOOD CELLS: 1 /100 WBC
PDW BLD-RTO: 15.9 FL (ref 11.5–15)
PLATELET # BLD: 167 E9/L (ref 130–450)
PMV BLD AUTO: 12 FL (ref 7–12)
POIKILOCYTES: ABNORMAL
POLYCHROMASIA: ABNORMAL
POTASSIUM SERPL-SCNC: 5.5 MMOL/L (ref 3.5–5)
PROTHROMBIN TIME: 45.1 SEC (ref 9.3–12.4)
RBC # BLD: 4.18 E12/L (ref 3.5–5.5)
SODIUM BLD-SCNC: 137 MMOL/L (ref 132–146)
TOTAL PROTEIN: 5.7 G/DL (ref 6.4–8.3)
WBC # BLD: 8 E9/L (ref 4.5–11.5)

## 2018-03-25 PROCEDURE — 85610 PROTHROMBIN TIME: CPT

## 2018-03-25 PROCEDURE — 85025 COMPLETE CBC W/AUTO DIFF WBC: CPT

## 2018-03-25 PROCEDURE — 6370000000 HC RX 637 (ALT 250 FOR IP): Performed by: INTERNAL MEDICINE

## 2018-03-25 PROCEDURE — 80053 COMPREHEN METABOLIC PANEL: CPT

## 2018-03-25 PROCEDURE — 94640 AIRWAY INHALATION TREATMENT: CPT

## 2018-03-25 PROCEDURE — 2580000003 HC RX 258: Performed by: INTERNAL MEDICINE

## 2018-03-25 PROCEDURE — 71046 X-RAY EXAM CHEST 2 VIEWS: CPT

## 2018-03-25 PROCEDURE — 36415 COLL VENOUS BLD VENIPUNCTURE: CPT

## 2018-03-25 PROCEDURE — 1200000000 HC SEMI PRIVATE

## 2018-03-25 RX ADMIN — GUAIFENESIN 600 MG: 600 TABLET, EXTENDED RELEASE ORAL at 20:37

## 2018-03-25 RX ADMIN — CARVEDILOL 3.12 MG: 3.12 TABLET, FILM COATED ORAL at 17:31

## 2018-03-25 RX ADMIN — TRAMADOL HYDROCHLORIDE 50 MG: 50 TABLET, FILM COATED ORAL at 08:24

## 2018-03-25 RX ADMIN — VITAMIN D, TAB 1000IU (100/BT) 1000 UNITS: 25 TAB at 08:24

## 2018-03-25 RX ADMIN — SACUBITRIL AND VALSARTAN 0.5 TABLET: 24; 26 TABLET, FILM COATED ORAL at 08:24

## 2018-03-25 RX ADMIN — Medication 10 ML: at 20:38

## 2018-03-25 RX ADMIN — TRAMADOL HYDROCHLORIDE 50 MG: 50 TABLET, FILM COATED ORAL at 20:37

## 2018-03-25 RX ADMIN — IPRATROPIUM BROMIDE AND ALBUTEROL SULFATE 1 AMPULE: .5; 3 SOLUTION RESPIRATORY (INHALATION) at 09:52

## 2018-03-25 RX ADMIN — IPRATROPIUM BROMIDE AND ALBUTEROL SULFATE 1 AMPULE: .5; 3 SOLUTION RESPIRATORY (INHALATION) at 18:31

## 2018-03-25 RX ADMIN — Medication 10 ML: at 08:23

## 2018-03-25 RX ADMIN — SACUBITRIL AND VALSARTAN 0.5 TABLET: 24; 26 TABLET, FILM COATED ORAL at 20:37

## 2018-03-25 RX ADMIN — IPRATROPIUM BROMIDE AND ALBUTEROL SULFATE 1 AMPULE: .5; 3 SOLUTION RESPIRATORY (INHALATION) at 15:18

## 2018-03-25 RX ADMIN — FUROSEMIDE 20 MG: 20 TABLET ORAL at 08:23

## 2018-03-25 RX ADMIN — IPRATROPIUM BROMIDE AND ALBUTEROL SULFATE 1 AMPULE: .5; 3 SOLUTION RESPIRATORY (INHALATION) at 06:56

## 2018-03-25 RX ADMIN — CARVEDILOL 3.12 MG: 3.12 TABLET, FILM COATED ORAL at 08:24

## 2018-03-25 RX ADMIN — GUAIFENESIN 600 MG: 600 TABLET, EXTENDED RELEASE ORAL at 08:24

## 2018-03-25 ASSESSMENT — PAIN SCALES - GENERAL
PAINLEVEL_OUTOF10: 0
PAINLEVEL_OUTOF10: 0
PAINLEVEL_OUTOF10: 2
PAINLEVEL_OUTOF10: 0
PAINLEVEL_OUTOF10: 5
PAINLEVEL_OUTOF10: 0

## 2018-03-25 NOTE — PROGRESS NOTES
without difficulty. Musculoskeletal:  Negative for myalgias, back pain and arthralgias      Allergy:  No Known Allergies     Medication:  Scheduled Meds:   furosemide  20 mg Oral Daily    furosemide  20 mg Intravenous Once    carvedilol  3.125 mg Oral BID WC    fluticasone  1 spray Nasal Daily    guaiFENesin  600 mg Oral BID    traMADol  50 mg Oral BID    vitamin D  1,000 Units Oral Daily    sodium chloride flush  10 mL Intravenous 2 times per day    sacubitril-valsartan  0.5 tablet Oral BID    ipratropium-albuterol  1 ampule Inhalation Q4H While awake     Continuous Infusions:    Objective Data:  CBC:   Recent Labs      03/23/18   1145  03/24/18   0806  03/25/18   0819   WBC  8.3  9.0  8.0   HGB  12.4  12.0  11.9   PLT  185  178  167     BMP:    Recent Labs      03/23/18   1145  03/24/18   0806  03/25/18   0819   NA  133  134  137   K  5.2*  5.2*  5.5*   CL  96*  98  99   CO2  19*  21*  21*   BUN  73*  75*  78*   CREATININE  2.3*  2.1*  2.1*   GLUCOSE  100  94  92     CMP:    Lab Results   Component Value Date     03/25/2018    K 5.5 03/25/2018    CL 99 03/25/2018    CO2 21 03/25/2018    BUN 78 03/25/2018    CREATININE 2.1 03/25/2018    GFRAA 27 03/25/2018    LABGLOM 22 03/25/2018    GLUCOSE 92 03/25/2018    PROT 5.7 03/25/2018    LABALBU 3.6 03/25/2018    CALCIUM 8.9 03/25/2018    BILITOT 1.1 03/25/2018    ALKPHOS 121 03/25/2018     03/25/2018     03/25/2018     Hepatic:   Recent Labs      03/23/18   1145  03/24/18   0806  03/25/18   0819   AST  93*  107*  137*   ALT  107*  129*  162*   BILITOT  0.7  0.9  1.1   ALKPHOS  107*  114*  121*     Troponin:   Recent Labs      03/23/18   1145   TROPONINI  0.02     BNP: No results for input(s): BNP in the last 72 hours.   Lipids:   Recent Labs      03/24/18   0806   CHOL  86   HDL  41     ABGs: No results found for: PHART, PO2ART, YGX6TNA  INR:   Recent Labs      03/23/18   1410  03/24/18   0806  03/25/18   0819   INR  4.9  4.5  4.0   PROTIME not rapid and/or pressured. HEENT:   PERRLA. EOMI. Sclera clear. Buccal mucosa moist. Impaired hearing. Upper dentures. Neck:    Supple. Trachea midline. No thyromegaly. No JVD. No bruits. Heart:    Irregular rhythm with controlled rate. Atrial fibrillation on the monitor. Grade 3/6 systolic murmur. Lungs:  Symmetrical. Clear to auscultation bilaterally but diminished. No wheezes. No rhonchi. No rales. Abdomen:   Soft. Non-tender. Non-distended. Bowel sounds positive. No organomegaly or masses. No pain on palpation. Extremities:    Peripheral pulses present. No peripheral edema. No ulcers. Neurologic:    Alert x 3. No focal deficit. Cranial nerves grossly intact. Skin:    No rashes or lesions         Chronic Hospital Medical Problems:  Past Medical History:   Diagnosis Date    Anxiety     Arthritis     Atrial fibrillation ([address]     CAD (coronary artery disease)     Chronic kidney disease     STAGE 4    Congestive heart failure (CHF) (HCC)     Hyperlipidemia     Hypertension     Microcytic anemia      Principal Problem:    Acute combined systolic and diastolic congestive heart failure (HCC)  Active Problems:    Chronic kidney disease    Permanent atrial fibrillation (HCC)    Hyperlipidemia    Hypertension    Elevated liver function tests    Mitral regurgitation    CHF (congestive heart failure), NYHA class I, acute on chronic, combined ([address]  Resolved Problems:    * No resolved hospital problems.  *      Assessment:  · Acute on chronic combined diastolic and systolic congestive heart failure  · Moderate to severe pulmonary hypertension  · Hypertension  · Chronic Atrial fibrillation on coumadin  · Hyperkalemia  · Supratherapeutic INR  · Elevated transaminases-likely liver congestion  · NANI on Chronic kidney disease stage 4  · Valvular disease, consistent with moderate/severe mitral regurgitation and moderate tricuspid regurgitation  · Microcytic anemia  · Recent Influenza B

## 2018-03-26 LAB
ALBUMIN SERPL-MCNC: 3.5 G/DL (ref 3.5–5.2)
ALP BLD-CCNC: 131 U/L (ref 35–104)
ALT SERPL-CCNC: 197 U/L (ref 0–32)
ANION GAP SERPL CALCULATED.3IONS-SCNC: 15 MMOL/L (ref 7–16)
ANISOCYTOSIS: ABNORMAL
AST SERPL-CCNC: 149 U/L (ref 0–31)
BASOPHILS ABSOLUTE: 0 E9/L (ref 0–0.2)
BASOPHILS RELATIVE PERCENT: 0.1 % (ref 0–2)
BILIRUB SERPL-MCNC: 1.3 MG/DL (ref 0–1.2)
BUN BLDV-MCNC: 78 MG/DL (ref 8–23)
BURR CELLS: ABNORMAL
CALCIUM SERPL-MCNC: 8.8 MG/DL (ref 8.6–10.2)
CHLORIDE BLD-SCNC: 99 MMOL/L (ref 98–107)
CO2: 20 MMOL/L (ref 22–29)
CREAT SERPL-MCNC: 2.1 MG/DL (ref 0.5–1)
EOSINOPHILS ABSOLUTE: 0.14 E9/L (ref 0.05–0.5)
EOSINOPHILS RELATIVE PERCENT: 2 % (ref 0–6)
GFR AFRICAN AMERICAN: 27
GFR NON-AFRICAN AMERICAN: 22 ML/MIN/1.73
GLUCOSE BLD-MCNC: 98 MG/DL (ref 74–109)
HAV IGM SER IA-ACNC: NORMAL
HCT VFR BLD CALC: 37.7 % (ref 34–48)
HEMOGLOBIN: 11.9 G/DL (ref 11.5–15.5)
HEPATITIS B CORE IGM ANTIBODY: NORMAL
HEPATITIS B SURFACE ANTIGEN INTERPRETATION: NORMAL
HEPATITIS C ANTIBODY INTERPRETATION: NORMAL
INR BLD: 3.2
LV EF: 20 %
LVEF MODALITY: NORMAL
LYMPHOCYTES ABSOLUTE: 0.21 E9/L (ref 1.5–4)
LYMPHOCYTES RELATIVE PERCENT: 3 % (ref 20–42)
MCH RBC QN AUTO: 28.3 PG (ref 26–35)
MCHC RBC AUTO-ENTMCNC: 31.6 % (ref 32–34.5)
MCV RBC AUTO: 89.8 FL (ref 80–99.9)
MONOCYTES ABSOLUTE: 0.49 E9/L (ref 0.1–0.95)
MONOCYTES RELATIVE PERCENT: 6.5 % (ref 2–12)
NEUTROPHILS ABSOLUTE: 6.16 E9/L (ref 1.8–7.3)
NEUTROPHILS RELATIVE PERCENT: 88.4 % (ref 43–80)
NUCLEATED RED BLOOD CELLS: 2.5 /100 WBC
OVALOCYTES: ABNORMAL
PDW BLD-RTO: 15.7 FL (ref 11.5–15)
PLATELET # BLD: 162 E9/L (ref 130–450)
PMV BLD AUTO: 11.7 FL (ref 7–12)
POIKILOCYTES: ABNORMAL
POLYCHROMASIA: ABNORMAL
POTASSIUM SERPL-SCNC: 4.9 MMOL/L (ref 3.5–5)
PROTHROMBIN TIME: 35.5 SEC (ref 9.3–12.4)
RBC # BLD: 4.2 E12/L (ref 3.5–5.5)
SODIUM BLD-SCNC: 134 MMOL/L (ref 132–146)
TOTAL PROTEIN: 5.6 G/DL (ref 6.4–8.3)
WBC # BLD: 7 E9/L (ref 4.5–11.5)

## 2018-03-26 PROCEDURE — 93306 TTE W/DOPPLER COMPLETE: CPT

## 2018-03-26 PROCEDURE — 36415 COLL VENOUS BLD VENIPUNCTURE: CPT

## 2018-03-26 PROCEDURE — 97535 SELF CARE MNGMENT TRAINING: CPT

## 2018-03-26 PROCEDURE — 6370000000 HC RX 637 (ALT 250 FOR IP): Performed by: INTERNAL MEDICINE

## 2018-03-26 PROCEDURE — 97165 OT EVAL LOW COMPLEX 30 MIN: CPT

## 2018-03-26 PROCEDURE — 94640 AIRWAY INHALATION TREATMENT: CPT

## 2018-03-26 PROCEDURE — 97110 THERAPEUTIC EXERCISES: CPT

## 2018-03-26 PROCEDURE — 85025 COMPLETE CBC W/AUTO DIFF WBC: CPT

## 2018-03-26 PROCEDURE — 85610 PROTHROMBIN TIME: CPT

## 2018-03-26 PROCEDURE — G8987 SELF CARE CURRENT STATUS: HCPCS

## 2018-03-26 PROCEDURE — 80053 COMPREHEN METABOLIC PANEL: CPT

## 2018-03-26 PROCEDURE — 97116 GAIT TRAINING THERAPY: CPT

## 2018-03-26 PROCEDURE — 6360000002 HC RX W HCPCS: Performed by: INTERNAL MEDICINE

## 2018-03-26 PROCEDURE — G8988 SELF CARE GOAL STATUS: HCPCS

## 2018-03-26 PROCEDURE — 2580000003 HC RX 258: Performed by: INTERNAL MEDICINE

## 2018-03-26 PROCEDURE — 97530 THERAPEUTIC ACTIVITIES: CPT

## 2018-03-26 PROCEDURE — 1200000000 HC SEMI PRIVATE

## 2018-03-26 RX ADMIN — CARVEDILOL 3.12 MG: 3.12 TABLET, FILM COATED ORAL at 08:03

## 2018-03-26 RX ADMIN — TRAMADOL HYDROCHLORIDE 50 MG: 50 TABLET, FILM COATED ORAL at 08:03

## 2018-03-26 RX ADMIN — VITAMIN D, TAB 1000IU (100/BT) 1000 UNITS: 25 TAB at 08:03

## 2018-03-26 RX ADMIN — ONDANSETRON 4 MG: 2 INJECTION INTRAMUSCULAR; INTRAVENOUS at 15:47

## 2018-03-26 RX ADMIN — FUROSEMIDE 20 MG: 20 TABLET ORAL at 08:03

## 2018-03-26 RX ADMIN — IPRATROPIUM BROMIDE AND ALBUTEROL SULFATE 1 AMPULE: .5; 3 SOLUTION RESPIRATORY (INHALATION) at 19:58

## 2018-03-26 RX ADMIN — CARVEDILOL 3.12 MG: 3.12 TABLET, FILM COATED ORAL at 17:53

## 2018-03-26 RX ADMIN — IPRATROPIUM BROMIDE AND ALBUTEROL SULFATE 1 AMPULE: .5; 3 SOLUTION RESPIRATORY (INHALATION) at 15:23

## 2018-03-26 RX ADMIN — Medication 10 ML: at 20:50

## 2018-03-26 RX ADMIN — SACUBITRIL AND VALSARTAN 0.5 TABLET: 24; 26 TABLET, FILM COATED ORAL at 08:03

## 2018-03-26 RX ADMIN — IPRATROPIUM BROMIDE AND ALBUTEROL SULFATE 1 AMPULE: .5; 3 SOLUTION RESPIRATORY (INHALATION) at 06:04

## 2018-03-26 RX ADMIN — IPRATROPIUM BROMIDE AND ALBUTEROL SULFATE 1 AMPULE: .5; 3 SOLUTION RESPIRATORY (INHALATION) at 09:18

## 2018-03-26 RX ADMIN — Medication 10 ML: at 08:02

## 2018-03-26 RX ADMIN — GUAIFENESIN 600 MG: 600 TABLET, EXTENDED RELEASE ORAL at 08:03

## 2018-03-26 RX ADMIN — TRAMADOL HYDROCHLORIDE 50 MG: 50 TABLET, FILM COATED ORAL at 20:49

## 2018-03-26 RX ADMIN — GUAIFENESIN 600 MG: 600 TABLET, EXTENDED RELEASE ORAL at 20:49

## 2018-03-26 RX ADMIN — ACETAMINOPHEN 650 MG: 325 TABLET, FILM COATED ORAL at 08:02

## 2018-03-26 ASSESSMENT — PAIN SCALES - GENERAL
PAINLEVEL_OUTOF10: 5
PAINLEVEL_OUTOF10: 3
PAINLEVEL_OUTOF10: 0
PAINLEVEL_OUTOF10: 0
PAINLEVEL_OUTOF10: 5
PAINLEVEL_OUTOF10: 0
PAINLEVEL_OUTOF10: 0
PAINLEVEL_OUTOF10: 3

## 2018-03-26 ASSESSMENT — PAIN DESCRIPTION - PAIN TYPE: TYPE: ACUTE PAIN

## 2018-03-26 ASSESSMENT — PAIN DESCRIPTION - LOCATION: LOCATION: HEAD

## 2018-03-26 NOTE — PROGRESS NOTES
Patient is seen for CHF     Subjective:     Ms. Meg Bradley  states that her shortness of breath feels like it's improving. Patient states that she still experiences dyspnea on exertion, but is improving. Patient denies any chest pain, no lightheadedness, no dizziness, no palpitations, no pedal edema, no PND, no orthopnea, no syncope, no presyncopal episodes. Patient sitting up in chair, working with physical therapy, in no acute distress. ROS:  CONSTITUTIONAL:  negative for  fevers, chills  HEENT:  negative for earaches, nasal congestion and epistaxis  RESPIRATORY:  negative for  dry cough, cough with sputum,wheezing and hemoptysis  GASTROINTESTINAL:  negative for nausea, vomiting  MUSCULOSKELETAL:  negative for  myalgias, arthralgias  NEUROLOGICAL:  negative for visual disturbance, dysphagia    Medication side effects: none    Scheduled Meds:   furosemide  20 mg Oral Daily    furosemide  20 mg Intravenous Once    carvedilol  3.125 mg Oral BID WC    fluticasone  1 spray Nasal Daily    guaiFENesin  600 mg Oral BID    traMADol  50 mg Oral BID    vitamin D  1,000 Units Oral Daily    sodium chloride flush  10 mL Intravenous 2 times per day    ipratropium-albuterol  1 ampule Inhalation Q4H While awake     Continuous Infusions:  PRN Meds:perflutren lipid microspheres, docusate, sodium chloride, sodium chloride flush, acetaminophen, magnesium hydroxide, ondansetron      Objective:      Physical Exam:   /65   Pulse 67   Temp 97.7 °F (36.5 °C) (Oral)   Resp 18   Ht 4' 7\" (1.397 m)   Wt 120 lb (54.4 kg)   SpO2 98%   BMI 27.89 kg/m²     CONSTITUTIONAL:  awake, alert, cooperative, Slight respiratory distress, and appears stated age  HEAD:  normocepalic, without obvious abnormality, atraumatic, pink, moist mucous membranes.   NECK:  Supple, symmetrical, trachea midline, no adenopathy, thyroid symmetric, not enlarged and no tenderness, skin normal  LUNGS:  Mild increased work of breathing, diminished air exchange, clear to auscultation bilaterally, no crackles or wheezing  CARDIOVASCULAR:  Normal apical impulse, irregular rate and rhythm, normal S1 and S2, no S3, 3/6 systolic murmur at the apex, 4/6 systolic murmur at the left lower sternal border,  no JVD, no carotid bruit, no pedal edema, good carotid upstroke bilaterally. ABDOMEN:  Soft, nontender, no masses, no hepatomegaly or splenomegaly, BS+  CHEST: nontender to palpation, expands symmetrically  MUSCULOSKELETAL:  No clubbing no cyanosis. there is no redness, warmth, or swelling of the joints  full range of motion noted  NEUROLOGIC:  Alert, awake,oriented x3   SKIN:  no bruising or bleeding, normal skin color, texture, turgor and no redness, warmth, or swelling    Cardiographics  I personally reviewed the telemetry monitor strips with the following interpretation: atrial fibrillation with controled ventricular rate with occasional PVCs     Echocardiogram:  3/24/2018 - Summary   Compared to prior echo, no significant changes noted. Technically adequate   study.   Normal left ventricular wall thickness.   Ejection fraction is visually estimated at 20%.   moderate global wall hypokinesis   Indeterminate diastolic function.   No evidence of left ventricular mass or thrombus noted.   Moderate to severe mitral regurgitation is present.   Mild mitral stenosis.   Mild aortic regurgitation is noted.   Moderate tricuspid regurgitation.  RVSP is 35 mmHg.   Pulmonary hypertension is mild .  Zelpha Shantel is a small pericardial effusion noted. 9/30/2016  Summary   No previous echo for comparison. Technically adequate study.   Normal left ventricular wall thickness.   proximal septal thickening   Ejection fraction is visually estimated at 40-45%.   Indeterminate diastolic function   Moderate mitral regurgitation is present.   Mild aortic regurgitation is noted.   Mild to moderate tricuspid regurgitation.    RVSP is 34 mmHg.   Pulmonary hypertension is mild .   Mild pulmonic regurgitation present. Imaging  XR CHEST STANDARD (2 VW)   Final Result   Cardiomegaly no change. US GALLBLADDER RUQ   Final Result   1. The patient is status post cholecystectomy. 2. No obstruction of the bile ducts or hepatic masses. 3. The pancreas is normal.      XR CHEST PORTABLE   Final Result   1. Cardiomegaly with vascular congestion. I believe the findings are   those of mild to moderate failure. 2. No focal infiltrates.           Lab Review   Lab Results   Component Value Date     03/26/2018     03/25/2018     03/24/2018    K 4.9 03/26/2018    K 5.5 03/25/2018    K 5.2 03/24/2018    CO2 20 03/26/2018    CO2 21 03/25/2018    CO2 21 03/24/2018    BUN 78 03/26/2018    BUN 78 03/25/2018    BUN 75 03/24/2018    CREATININE 2.1 03/26/2018    CREATININE 2.1 03/25/2018    CREATININE 2.1 03/24/2018    GLUCOSE 98 03/26/2018    GLUCOSE 92 03/25/2018    GLUCOSE 94 03/24/2018    CALCIUM 8.8 03/26/2018    CALCIUM 8.9 03/25/2018    CALCIUM 8.7 03/24/2018     Lab Results   Component Value Date    TROPONINI 0.02 03/23/2018    TROPONINI 0.02 03/17/2018    TROPONINI 0.03 06/27/2016     Lab Results   Component Value Date    WBC 7.0 03/26/2018    WBC 8.0 03/25/2018    WBC 9.0 03/24/2018    HGB 11.9 03/26/2018    HGB 11.9 03/25/2018    HGB 12.0 03/24/2018    HCT 37.7 03/26/2018    HCT 37.7 03/25/2018    HCT 38.7 03/24/2018    MCV 89.8 03/26/2018    MCV 90.2 03/25/2018    MCV 92.1 03/24/2018     03/26/2018     03/25/2018     03/24/2018     Lab Results   Component Value Date    CHOL 86 03/24/2018    CHOL 149 01/19/2018    CHOL 150 03/23/2017    TRIG 65 03/24/2018    TRIG 133 01/19/2018    TRIG 119 03/23/2017    HDL 41 03/24/2018    HDL 54 01/19/2018    HDL 62 03/23/2017     I have personally reviewed the laboratory, cardiac diagnostic and radiographic testing as outlined above:    Assessment:       Patient Active Problem List    Diagnosis Date Noted    CHF (congestive heart

## 2018-03-26 NOTE — PROGRESS NOTES
Physical Therapy  Daily Treatment Note  3/26/2018  [id number]/[id number]                      Che Bates   [id number]                              4/20/1931    Patient Active Problem List   Diagnosis    Chronic kidney disease    Permanent atrial fibrillation (HCC)    Hyperlipidemia    Arthritis    Hypertension    CHF (congestive heart failure) (HCC)    Acute combined systolic and diastolic congestive heart failure (HCC)    Elevated liver function tests    Mitral regurgitation    CHF (congestive heart failure), NYHA class I, acute on chronic, combined ([facility] [address]       Recommendation for discharge: subacute vs HHPT with 24/7 supervision  Equipment prescriptions needed: none at this time; continues to be determined   AM-PAC Basic Mobility    Key: total(1); a lot (2); a little (3): none (4)  How much help from another person is needed. ..  3  1. Turning from your back to your side while in a flat bed without using bed rails?    3  2. Moving from lying on your back to sitting on the side of a flat bed w/o using bed rails? 3  3. Moving to and from a bed to a chair or wheelchair?     3  4. Standing up from a chair using your arms?    2  5. To walk in a hospital room?    2  6. Climbing 3-5 steps with a railing? Raw score:  16/24    Precautions: falls, alarm, O2, fell on R shoulder 6 weeks ago    S: Patient cleared by nursing for treatment. Patient is agreeable to treatment. Pt c/o nausea, son present. Pain status: (measured on a visual analog scale with 0=no pain and 10=excruciating pain) 0/10. O: Pt was instructed in and performed the following:   Bed Mobility- Supine to sit- Minimal assist     Scooting- Minimal Assist     Sit to supine- NA   Transfers-sit to stand- Minimal assist with cues for hand placement     Gait: Patient ambulated 30 feet x 2 using cane and HHA x 1 with Moderate assistance. Comments: V/C for safety, upright posture, obstacle negotiation.    Steps: NA  Treatment: Pt sat on side of bed x 10

## 2018-03-26 NOTE — PROGRESS NOTES
Component Value Date    WBC 7.0 03/26/2018    RBC 4.20 03/26/2018    HGB 11.9 03/26/2018    HCT 37.7 03/26/2018     03/26/2018    MCV 89.8 03/26/2018    MCH 28.3 03/26/2018    MCHC 31.6 03/26/2018    RDW 15.7 03/26/2018    NRBC 2.5 03/26/2018    LYMPHOPCT 3.0 03/26/2018    MONOPCT 6.5 03/26/2018    BASOPCT 0.1 03/26/2018    MONOSABS 0.49 03/26/2018    LYMPHSABS 0.21 03/26/2018    EOSABS 0.14 03/26/2018    BASOSABS 0.00 03/26/2018     BMP:    Lab Results   Component Value Date     03/26/2018    K 4.9 03/26/2018    CL 99 03/26/2018    CO2 20 03/26/2018    BUN 78 03/26/2018    LABALBU 3.5 03/26/2018    CREATININE 2.1 03/26/2018    CALCIUM 8.8 03/26/2018    GFRAA 27 03/26/2018    LABGLOM 22 03/26/2018    GLUCOSE 98 03/26/2018       Other Data:      Intake/Output Summary (Last 24 hours) at 03/26/18 1500  Last data filed at 03/26/18 0429   Gross per 24 hour   Intake              600 ml   Output                0 ml   Net              600 ml         Scheduled Medications:   furosemide  20 mg Oral Daily    furosemide  20 mg Intravenous Once    carvedilol  3.125 mg Oral BID WC    fluticasone  1 spray Nasal Daily    guaiFENesin  600 mg Oral BID    traMADol  50 mg Oral BID    vitamin D  1,000 Units Oral Daily    sodium chloride flush  10 mL Intravenous 2 times per day    sacubitril-valsartan  0.5 tablet Oral BID    ipratropium-albuterol  1 ampule Inhalation Q4H While awake       Assessment:   1. Acute on chronic combined diastolic/systolic congestive heart failure  2. Moderate to severe pulmonary hypertension  3. Essential hypertension  4. Chronic atrial fibrillationn  5. NANI on Chronic kidney disease stage 4  6. Moderate/severe mitral regurgitation and moderate tricuspid regurgitation  7. Microcytic anemia  8. Recent Influenza B infection recently treated with tamiflu   9. Hyperlipidemia    Plan:   The patient appears weak and deconditioned.  I have encouraged her to become more ambulatory with the

## 2018-03-27 LAB
ALBUMIN SERPL-MCNC: 3.6 G/DL (ref 3.5–5.2)
ALP BLD-CCNC: 141 U/L (ref 35–104)
ALT SERPL-CCNC: 181 U/L (ref 0–32)
ANION GAP SERPL CALCULATED.3IONS-SCNC: 15 MMOL/L (ref 7–16)
ANISOCYTOSIS: ABNORMAL
AST SERPL-CCNC: 106 U/L (ref 0–31)
BASOPHILS ABSOLUTE: 0 E9/L (ref 0–0.2)
BASOPHILS RELATIVE PERCENT: 0.1 % (ref 0–2)
BILIRUB SERPL-MCNC: 1.2 MG/DL (ref 0–1.2)
BUN BLDV-MCNC: 77 MG/DL (ref 8–23)
BURR CELLS: ABNORMAL
CALCIUM SERPL-MCNC: 8.7 MG/DL (ref 8.6–10.2)
CHLORIDE BLD-SCNC: 97 MMOL/L (ref 98–107)
CO2: 20 MMOL/L (ref 22–29)
CREAT SERPL-MCNC: 2.1 MG/DL (ref 0.5–1)
EOSINOPHILS ABSOLUTE: 0.04 E9/L (ref 0.05–0.5)
EOSINOPHILS RELATIVE PERCENT: 0.5 % (ref 0–6)
GFR AFRICAN AMERICAN: 27
GFR NON-AFRICAN AMERICAN: 22 ML/MIN/1.73
GLUCOSE BLD-MCNC: 87 MG/DL (ref 74–109)
HCT VFR BLD CALC: 39.2 % (ref 34–48)
HEMOGLOBIN: 12.4 G/DL (ref 11.5–15.5)
INR BLD: 2.2
LYMPHOCYTES ABSOLUTE: 0.38 E9/L (ref 1.5–4)
LYMPHOCYTES RELATIVE PERCENT: 4.5 % (ref 20–42)
MCH RBC QN AUTO: 28.5 PG (ref 26–35)
MCHC RBC AUTO-ENTMCNC: 31.6 % (ref 32–34.5)
MCV RBC AUTO: 90.1 FL (ref 80–99.9)
MONOCYTES ABSOLUTE: 0.68 E9/L (ref 0.1–0.95)
MONOCYTES RELATIVE PERCENT: 8.6 % (ref 2–12)
NEUTROPHILS ABSOLUTE: 6.54 E9/L (ref 1.8–7.3)
NEUTROPHILS RELATIVE PERCENT: 86.4 % (ref 43–80)
NUCLEATED RED BLOOD CELLS: 2 /100 WBC
PDW BLD-RTO: 16.1 FL (ref 11.5–15)
PLATELET # BLD: 188 E9/L (ref 130–450)
PMV BLD AUTO: 12.4 FL (ref 7–12)
POIKILOCYTES: ABNORMAL
POLYCHROMASIA: ABNORMAL
POTASSIUM SERPL-SCNC: 5.3 MMOL/L (ref 3.5–5)
PROTHROMBIN TIME: 25.2 SEC (ref 9.3–12.4)
RBC # BLD: 4.35 E12/L (ref 3.5–5.5)
SODIUM BLD-SCNC: 132 MMOL/L (ref 132–146)
TOTAL PROTEIN: 5.8 G/DL (ref 6.4–8.3)
WBC # BLD: 7.6 E9/L (ref 4.5–11.5)

## 2018-03-27 PROCEDURE — 6370000000 HC RX 637 (ALT 250 FOR IP): Performed by: INTERNAL MEDICINE

## 2018-03-27 PROCEDURE — 2700000000 HC OXYGEN THERAPY PER DAY

## 2018-03-27 PROCEDURE — 36415 COLL VENOUS BLD VENIPUNCTURE: CPT

## 2018-03-27 PROCEDURE — 80053 COMPREHEN METABOLIC PANEL: CPT

## 2018-03-27 PROCEDURE — 97110 THERAPEUTIC EXERCISES: CPT

## 2018-03-27 PROCEDURE — 85610 PROTHROMBIN TIME: CPT

## 2018-03-27 PROCEDURE — 6370000000 HC RX 637 (ALT 250 FOR IP): Performed by: NURSE PRACTITIONER

## 2018-03-27 PROCEDURE — 85025 COMPLETE CBC W/AUTO DIFF WBC: CPT

## 2018-03-27 PROCEDURE — 97530 THERAPEUTIC ACTIVITIES: CPT

## 2018-03-27 PROCEDURE — 1200000000 HC SEMI PRIVATE

## 2018-03-27 PROCEDURE — 94640 AIRWAY INHALATION TREATMENT: CPT

## 2018-03-27 PROCEDURE — 2580000003 HC RX 258: Performed by: INTERNAL MEDICINE

## 2018-03-27 RX ORDER — WARFARIN SODIUM 1 MG/1
1 TABLET ORAL DAILY
Status: DISCONTINUED | OUTPATIENT
Start: 2018-03-27 | End: 2018-03-28 | Stop reason: HOSPADM

## 2018-03-27 RX ADMIN — SACUBITRIL AND VALSARTAN 1 TABLET: 24; 26 TABLET, FILM COATED ORAL at 21:00

## 2018-03-27 RX ADMIN — GUAIFENESIN 600 MG: 600 TABLET, EXTENDED RELEASE ORAL at 21:00

## 2018-03-27 RX ADMIN — IPRATROPIUM BROMIDE AND ALBUTEROL SULFATE 1 AMPULE: .5; 3 SOLUTION RESPIRATORY (INHALATION) at 07:25

## 2018-03-27 RX ADMIN — GUAIFENESIN 600 MG: 600 TABLET, EXTENDED RELEASE ORAL at 08:03

## 2018-03-27 RX ADMIN — VITAMIN D, TAB 1000IU (100/BT) 1000 UNITS: 25 TAB at 08:03

## 2018-03-27 RX ADMIN — CARVEDILOL 3.12 MG: 3.12 TABLET, FILM COATED ORAL at 17:29

## 2018-03-27 RX ADMIN — WARFARIN SODIUM 1 MG: 1 TABLET ORAL at 17:28

## 2018-03-27 RX ADMIN — Medication 10 ML: at 21:01

## 2018-03-27 RX ADMIN — Medication 10 ML: at 08:03

## 2018-03-27 RX ADMIN — CARVEDILOL 3.12 MG: 3.12 TABLET, FILM COATED ORAL at 08:03

## 2018-03-27 RX ADMIN — SACUBITRIL AND VALSARTAN 1 TABLET: 24; 26 TABLET, FILM COATED ORAL at 14:09

## 2018-03-27 RX ADMIN — FUROSEMIDE 20 MG: 20 TABLET ORAL at 08:03

## 2018-03-27 RX ADMIN — IPRATROPIUM BROMIDE AND ALBUTEROL SULFATE 1 AMPULE: .5; 3 SOLUTION RESPIRATORY (INHALATION) at 15:15

## 2018-03-27 RX ADMIN — IPRATROPIUM BROMIDE AND ALBUTEROL SULFATE 1 AMPULE: .5; 3 SOLUTION RESPIRATORY (INHALATION) at 20:09

## 2018-03-27 RX ADMIN — TRAMADOL HYDROCHLORIDE 50 MG: 50 TABLET, FILM COATED ORAL at 21:00

## 2018-03-27 RX ADMIN — IPRATROPIUM BROMIDE AND ALBUTEROL SULFATE 1 AMPULE: .5; 3 SOLUTION RESPIRATORY (INHALATION) at 10:27

## 2018-03-27 RX ADMIN — TRAMADOL HYDROCHLORIDE 50 MG: 50 TABLET, FILM COATED ORAL at 08:03

## 2018-03-27 ASSESSMENT — PAIN DESCRIPTION - DESCRIPTORS: DESCRIPTORS: ACHING;CONSTANT

## 2018-03-27 ASSESSMENT — PAIN SCALES - GENERAL
PAINLEVEL_OUTOF10: 0
PAINLEVEL_OUTOF10: 3
PAINLEVEL_OUTOF10: 0

## 2018-03-27 ASSESSMENT — PAIN DESCRIPTION - ORIENTATION: ORIENTATION: RIGHT;LEFT

## 2018-03-27 ASSESSMENT — PAIN DESCRIPTION - PAIN TYPE: TYPE: CHRONIC PAIN

## 2018-03-27 ASSESSMENT — PAIN DESCRIPTION - LOCATION: LOCATION: GENERALIZED;LEG

## 2018-03-27 NOTE — PROGRESS NOTES
P Quality Flow/Interdisciplinary Rounds Progress Note        Quality Flow Rounds held on March 27, 2018    Disciplines Attending:  Bedside Nurse, ,  and Nursing Unit Leadership    Elyssa Summers was admitted on 3/23/2018 11:12 AM    Anticipated Discharge Date:  Expected Discharge Date: 03/27/18    Disposition:    Keven Score:  Keven Scale Score: 19    Readmission Risk              Readmission Risk:        14.75       Age 72 or Greater:  1    Admitted from SNF or Requires Paid or Family Care:  0    Currently has CHF,COPD,ARF,CRI,or is on dialysis:  4    Takes more than 5 Prescription Medications:  4    Takes Digoxin,Insulin,Anticoagulants,Narcotics or ASA/Plavix:  [facility] in Past 12 Months:  0    On Disability:  0    Patient Considers own Health:  3.75          Discussed patient goal for the day, patient clinical progression, and barriers to discharge.   The following Goal(s) of the Day/Commitment(s) have been identified:  Activity progression, PT/OT, EF 20, switching meds, RA, AFIB, SOB      Stevenson Barker  March 27, 2018

## 2018-03-27 NOTE — PROGRESS NOTES
posture, obstacle negotiation. Steps: NA  Treatment: as above, pt performed ap, laq, heel raises, knee bends, marching x 10-20 reps   Comment: Pt sitting in chair at end of treatment, alarm activated. Call light left by patient. A: Pt with increased activity as above, agreed to limited use of wheeled walker while here in hospital. . Decreased strength and endurance limiting function, at risk of falls. P: Continue with physical therapy   Amaya Madison, DANAY    GOALS to be met in 3 days. Transfers-Sit to stand-Independent               Gait:  Patient to ambulate 150 feet using cane with Supervision               Steps: Patient to go up and down 3  step(s) using rail(s) with  Supervision   Increase strength in affected mm groups by 1/3 grade  Increase balance to allow for improvement towards functional goals. Increase endurance to allow for improvement towards functional goals.

## 2018-03-27 NOTE — PROGRESS NOTES
Patient is seen for CHF     Subjective:     Ms. Cornell Peralta she feels well, and is ready to go home. Patient states that she still experiences dyspnea on exertion, but is improving. Patient denies any chest pain, no lightheadedness, no dizziness, no palpitations, no pedal edema, no PND, no orthopnea, no syncope, no presyncopal episodes. Patient laying in bed, in no acute distress. ROS:  CONSTITUTIONAL:  negative for  fevers, chills  HEENT:  negative for earaches, nasal congestion and epistaxis  RESPIRATORY:  negative for  dry cough, cough with sputum,wheezing and hemoptysis  GASTROINTESTINAL:  negative for nausea, vomiting  MUSCULOSKELETAL:  negative for  myalgias, arthralgias  NEUROLOGICAL:  negative for visual disturbance, dysphagia    Medication side effects: none    Scheduled Meds:   sacubitril-valsartan  1 tablet Oral BID    furosemide  20 mg Oral Daily    furosemide  20 mg Intravenous Once    carvedilol  3.125 mg Oral BID WC    fluticasone  1 spray Nasal Daily    guaiFENesin  600 mg Oral BID    traMADol  50 mg Oral BID    vitamin D  1,000 Units Oral Daily    sodium chloride flush  10 mL Intravenous 2 times per day    ipratropium-albuterol  1 ampule Inhalation Q4H While awake     Continuous Infusions:  PRN Meds:perflutren lipid microspheres, docusate, sodium chloride, sodium chloride flush, acetaminophen, magnesium hydroxide, ondansetron      Objective:      Physical Exam:   /69   Pulse 79   Temp 97.9 °F (36.6 °C) (Oral)   Resp 18   Ht 4' 7\" (1.397 m)   Wt 120 lb (54.4 kg)   SpO2 98%   BMI 27.89 kg/m²     CONSTITUTIONAL:  awake, alert, cooperative, no respiratory distress, and appears stated age  HEAD:  normocepalic, without obvious abnormality, atraumatic, pink, moist mucous membranes.   NECK:  Supple, symmetrical, trachea midline, no adenopathy, thyroid symmetric, not enlarged and no tenderness, skin normal  LUNGS:  No increased work of breathing, diminished air exchange, clear to auscultation bilaterally, no crackles or wheezing  CARDIOVASCULAR:  Normal apical impulse, irregular rate and rhythm, normal S1 and S2, no S3 or S4, and no murmur noted and no JVD, no carotid bruit, no pedal edema, good carotid upstroke bilaterally. ABDOMEN:  Soft, nontender, no masses, no hepatomegaly or splenomegaly, BS+  CHEST: nontender to palpation, expands symmetrically  MUSCULOSKELETAL:  No clubbing no cyanosis. there is no redness, warmth, or swelling of the joints  full range of motion noted  NEUROLOGIC:  Alert, awake,oriented x3   SKIN:  no bruising or bleeding, normal skin color, texture, turgor and no redness, warmth, or swelling    Cardiographics  I personally reviewed the telemetry monitor strips with the following interpretation: atrial fibrillation with controled ventricular rate with occasional PVCs     Echocardiogram:  3/24/2018  Summary   Compared to prior echo, no significant changes noted. Technically adequate   study.   Normal left ventricular wall thickness.   Ejection fraction is visually estimated at 20%.   moderate global wall hypokinesis   Indeterminate diastolic function.   No evidence of left ventricular mass or thrombus noted.   Moderate to severe mitral regurgitation is present.   Mild mitral stenosis.   Mild aortic regurgitation is noted.   Moderate tricuspid regurgitation.  RVSP is 35 mmHg.   Pulmonary hypertension is mild .  Alvaro Laughter is a small pericardial effusion noted.        9/30/2016  Summary   No previous echo for comparison. Technically adequate study.   Normal left ventricular wall thickness.   proximal septal thickening   Ejection fraction is visually estimated at 40-45%.   Indeterminate diastolic function   Moderate mitral regurgitation is present.   Mild aortic regurgitation is noted.   Mild to moderate tricuspid regurgitation.  RVSP is 34 mmHg.   Pulmonary hypertension is mild .   Mild pulmonic regurgitation present.       Imaging  XR CHEST STANDARD (2 VW)

## 2018-03-27 NOTE — PROGRESS NOTES
cardiovascular team continues to follow. The patient looks significantly better today and is anxious for discharge home. We will arrange home health care upon discharge.  I anticipate discharging the patient home tomorrow pending further recommendations from the cardiovascular team.    Roger Roa D.O.  12:49 PM  3/27/2018

## 2018-03-28 ENCOUNTER — APPOINTMENT (OUTPATIENT)
Dept: NUCLEAR MEDICINE | Age: 83
DRG: 291 | End: 2018-03-28
Payer: MEDICARE

## 2018-03-28 VITALS
HEIGHT: 55 IN | BODY MASS INDEX: 30.57 KG/M2 | WEIGHT: 132.1 LBS | HEART RATE: 88 BPM | TEMPERATURE: 97.5 F | DIASTOLIC BLOOD PRESSURE: 59 MMHG | RESPIRATION RATE: 18 BRPM | SYSTOLIC BLOOD PRESSURE: 103 MMHG | OXYGEN SATURATION: 97 %

## 2018-03-28 LAB
ACANTHOCYTES: ABNORMAL
ALBUMIN SERPL-MCNC: 3.7 G/DL (ref 3.5–5.2)
ALP BLD-CCNC: 142 U/L (ref 35–104)
ALT SERPL-CCNC: 152 U/L (ref 0–32)
ANION GAP SERPL CALCULATED.3IONS-SCNC: 15 MMOL/L (ref 7–16)
ANISOCYTOSIS: ABNORMAL
AST SERPL-CCNC: 72 U/L (ref 0–31)
BASOPHILS ABSOLUTE: 0.05 E9/L (ref 0–0.2)
BASOPHILS RELATIVE PERCENT: 0.5 % (ref 0–2)
BILIRUB SERPL-MCNC: 1 MG/DL (ref 0–1.2)
BUN BLDV-MCNC: 73 MG/DL (ref 8–23)
BURR CELLS: ABNORMAL
CALCIUM SERPL-MCNC: 9 MG/DL (ref 8.6–10.2)
CHLORIDE BLD-SCNC: 100 MMOL/L (ref 98–107)
CO2: 21 MMOL/L (ref 22–29)
CREAT SERPL-MCNC: 2.1 MG/DL (ref 0.5–1)
EOSINOPHILS ABSOLUTE: 0.24 E9/L (ref 0.05–0.5)
EOSINOPHILS RELATIVE PERCENT: 2.5 % (ref 0–6)
GFR AFRICAN AMERICAN: 27
GFR NON-AFRICAN AMERICAN: 22 ML/MIN/1.73
GLUCOSE BLD-MCNC: 99 MG/DL (ref 74–109)
HCT VFR BLD CALC: 38.9 % (ref 34–48)
HEMOGLOBIN: 12.3 G/DL (ref 11.5–15.5)
INR BLD: 1.7
LV EF: 34 %
LVEF MODALITY: NORMAL
LYMPHOCYTES ABSOLUTE: 0.28 E9/L (ref 1.5–4)
LYMPHOCYTES RELATIVE PERCENT: 3 % (ref 20–42)
MCH RBC QN AUTO: 28.3 PG (ref 26–35)
MCHC RBC AUTO-ENTMCNC: 31.6 % (ref 32–34.5)
MCV RBC AUTO: 89.4 FL (ref 80–99.9)
MONOCYTES ABSOLUTE: 0.85 E9/L (ref 0.1–0.95)
MONOCYTES RELATIVE PERCENT: 8.5 % (ref 2–12)
NEUTROPHILS ABSOLUTE: 8.08 E9/L (ref 1.8–7.3)
NEUTROPHILS RELATIVE PERCENT: 85.5 % (ref 43–80)
OVALOCYTES: ABNORMAL
PDW BLD-RTO: 15.9 FL (ref 11.5–15)
PLATELET # BLD: 187 E9/L (ref 130–450)
PMV BLD AUTO: 12.2 FL (ref 7–12)
POIKILOCYTES: ABNORMAL
POLYCHROMASIA: ABNORMAL
POTASSIUM SERPL-SCNC: 4.6 MMOL/L (ref 3.5–5)
PROTHROMBIN TIME: 19.1 SEC (ref 9.3–12.4)
RBC # BLD: 4.35 E12/L (ref 3.5–5.5)
SODIUM BLD-SCNC: 136 MMOL/L (ref 132–146)
SPHEROCYTES: ABNORMAL
TOTAL PROTEIN: 6.1 G/DL (ref 6.4–8.3)
WBC # BLD: 9.4 E9/L (ref 4.5–11.5)

## 2018-03-28 PROCEDURE — 6370000000 HC RX 637 (ALT 250 FOR IP): Performed by: INTERNAL MEDICINE

## 2018-03-28 PROCEDURE — 80053 COMPREHEN METABOLIC PANEL: CPT

## 2018-03-28 PROCEDURE — 36415 COLL VENOUS BLD VENIPUNCTURE: CPT

## 2018-03-28 PROCEDURE — 3430000000 HC RX DIAGNOSTIC RADIOPHARMACEUTICAL: Performed by: RADIOLOGY

## 2018-03-28 PROCEDURE — 6360000002 HC RX W HCPCS: Performed by: INTERNAL MEDICINE

## 2018-03-28 PROCEDURE — 94640 AIRWAY INHALATION TREATMENT: CPT

## 2018-03-28 PROCEDURE — 93017 CV STRESS TEST TRACING ONLY: CPT

## 2018-03-28 PROCEDURE — 85025 COMPLETE CBC W/AUTO DIFF WBC: CPT

## 2018-03-28 PROCEDURE — 85610 PROTHROMBIN TIME: CPT

## 2018-03-28 PROCEDURE — A9500 TC99M SESTAMIBI: HCPCS | Performed by: RADIOLOGY

## 2018-03-28 PROCEDURE — 78452 HT MUSCLE IMAGE SPECT MULT: CPT

## 2018-03-28 RX ORDER — FUROSEMIDE 20 MG/1
20 TABLET ORAL DAILY
Qty: 60 TABLET | Refills: 3 | Status: SHIPPED | OUTPATIENT
Start: 2018-03-29 | End: 2018-04-10 | Stop reason: ALTCHOICE

## 2018-03-28 RX ADMIN — IPRATROPIUM BROMIDE AND ALBUTEROL SULFATE 1 AMPULE: .5; 3 SOLUTION RESPIRATORY (INHALATION) at 14:51

## 2018-03-28 RX ADMIN — TRAMADOL HYDROCHLORIDE 50 MG: 50 TABLET, FILM COATED ORAL at 11:30

## 2018-03-28 RX ADMIN — IPRATROPIUM BROMIDE AND ALBUTEROL SULFATE 1 AMPULE: .5; 3 SOLUTION RESPIRATORY (INHALATION) at 06:00

## 2018-03-28 RX ADMIN — VITAMIN D, TAB 1000IU (100/BT) 1000 UNITS: 25 TAB at 11:30

## 2018-03-28 RX ADMIN — MAGNESIUM HYDROXIDE 30 ML: 400 SUSPENSION ORAL at 11:30

## 2018-03-28 RX ADMIN — SACUBITRIL AND VALSARTAN 1 TABLET: 24; 26 TABLET, FILM COATED ORAL at 11:29

## 2018-03-28 RX ADMIN — Medication 10 MILLICURIE: at 09:07

## 2018-03-28 RX ADMIN — FUROSEMIDE 20 MG: 20 TABLET ORAL at 11:32

## 2018-03-28 RX ADMIN — REGADENOSON 0.4 MG: 0.08 INJECTION, SOLUTION INTRAVENOUS at 10:27

## 2018-03-28 RX ADMIN — CARVEDILOL 3.12 MG: 3.12 TABLET, FILM COATED ORAL at 11:30

## 2018-03-28 RX ADMIN — GUAIFENESIN 600 MG: 600 TABLET, EXTENDED RELEASE ORAL at 11:30

## 2018-03-28 RX ADMIN — Medication 30 MILLICURIE: at 10:29

## 2018-03-28 ASSESSMENT — PAIN SCALES - GENERAL
PAINLEVEL_OUTOF10: 0
PAINLEVEL_OUTOF10: 0

## 2018-03-28 NOTE — PROGRESS NOTES
P Quality Flow/Interdisciplinary Rounds Progress Note        Quality Flow Rounds held on March 28, 2018    Disciplines Attending:  Bedside Nurse, ,  and Nursing Unit Leadership    Jamie Dominguez was admitted on 3/23/2018 11:12 AM    Anticipated Discharge Date:  Expected Discharge Date: 03/28/18    Disposition:    Keven Score:  Keven Scale Score: 19    Readmission Risk              Readmission Risk:        14.75       Age 72 or Greater:  1    Admitted from SNF or Requires Paid or Family Care:  0    Currently has CHF,COPD,ARF,CRI,or is on dialysis:  4    Takes more than 5 Prescription Medications:  4    Takes Digoxin,Insulin,Anticoagulants,Narcotics or ASA/Plavix:  [address] in Past 12 Months:  0    On Disability:  0    Patient Considers own Health:  3.75          Discussed patient goal for the day, patient clinical progression, and barriers to discharge.   The following Goal(s) of the Day/Commitment(s) have been identified:  Stress if neg prompt for DC      Chelsy Johnson  March 28, 2018

## 2018-03-28 NOTE — PROCEDURES
[address]                                  PROCEDURE NOTE    PATIENT NAME: Marta Washington                  :        1931  MED REC NO:   [id number]                            ROOM:       9610  ACCOUNT NO:   [de-identified]                           ADMIT DATE: 2018  PROVIDER:     Odilia Fisher DO    DATE OF PROCEDURE:  2018    INDICATIONS:  The patient is a very polite and pleasant 77-year-old   female who is currently hospitalized for decompensated congestive  heart failure. Echocardiogram was indicating an ejection fraction of 20%. It was determined she will undergo stress test evaluation for further  evaluation of her cardiomyopathy. PROCEDURE:  The patient was brought to the Cardiac Stress Lab and connected  to the continuous cardiac monitoring. Resting EKG indicated normal sinus  rhythm. She was administered Lexiscan over 10 to 15 seconds followed by  injection of Cardiolite. She was placed in recovery at 1 minute. Throughout the examination, she had no unusual symptomatology. She denied  any chest pain or shortness of breath. There were isolated periods of PVCs  throughout the examination. Otherwise, there was no other ectopy  identified. She has physiologic response to both blood pressure and heart  rate. She tolerated the procedure well without complication. There did  not appear to be any evidence of Lexiscan-induced ischemia. She will now  be transferred to the imaging lab for final stress pictures.         Mc Gomez DO    D: 2018 10:30:50       T: 2018 11:14:07     [email or web address]  Job#: [id number]     Doc#: [id number]    CC:

## 2018-03-28 NOTE — DISCHARGE SUMMARY
Internal Medicine  Discharge Summary    NAME: Dominique Leigh  :  1931  MRN[facility]  [id number]  [facility]  ADMITTED: 3/23/2018      DISCHARGED: 3/28/18    ADMITTING PHYSICIAN: Linda Castro DO    CONSULTANT(S):   IP CONSULT TO HOSPITALIST  IP CONSULT TO CARDIOLOGY  IP CONSULT TO SOCIAL WORK     ADMITTING DIAGNOSIS:   Acute combined systolic and diastolic congestive heart failure (HCC) [I50.41]  CHF (congestive heart failure), NYHA class I, acute on chronic, combined ([facility] [address] [I50.43]     DISCHARGE DIAGNOSES:   1. Acutely decompensated systolic congestive heart failure with an ejection fraction of 20%  2. Moderate to severe pulmonary hypertension  3. Essential hypertension  4. Chronic atrial fibrillationn  5. NANI on Chronic kidney disease stage 4  6. Moderate/severe mitral regurgitation and moderate tricuspid regurgitation  7. Microcytic anemia  8. Recent Influenza B infection recently treated with tamiflu   9. Hyperlipidemia    BRIEF HISTORY OF PRESENT ILLNESS:   Dominique Leigh is a 80 y.o. female who presents to [facility] ER complaining of shortness of breath.     Dominique Leigh has a past medical history that includes Systolic CHF, moderate mitral regurgitation, mild aortic regurgitation, moderate tricuspid regurgitation, mild pulmonary hypertension, hypertension, anemia, hyperlipidemia, CK D stage IV, atrial fibrillation.     Mahesh Victoria presents to the ER with worsening cough and shortness of breath. She is diagnosed with influenza B on 3/17/2018 and started on Tamiflu. . She does admit to some shortness of breath. She has chronic A. Fib and CHF.     She was seen in [facility] this Tuesday, where she was also complaining of progressive shortness of breath.  She was told to present to ER if symptoms did not improve.     ER Course  Upon presentation to the ER, routine labwork was performed which revealed potassium of 5.2, BUN of 73, creatinine of 2.3, anion gap of 18, CO2 19, proBNP of 30,665,lactic acid of 2.7, alk phos of 107, ALT of 107, AST of 93 . Imaging results are as outlined below in the Imaging section of this note. EKG revealed atrial fibrillation. Upon arrival to the ER, patient was 93/68. The patient received 500 mL normal saline bolus, duo nebs in the emergency room and was admitted to telemetry under the care of Dr. Cher Chavez. LABS[de-identified]  Lab Results   Component Value Date    WBC 9.4 03/28/2018    HGB 12.3 03/28/2018    HCT 38.9 03/28/2018     03/28/2018     03/28/2018    K 4.6 03/28/2018     03/28/2018    CREATININE 2.1 (H) 03/28/2018    BUN 73 (H) 03/28/2018    CO2 21 (L) 03/28/2018    GLUCOSE 99 03/28/2018     (H) 03/28/2018    AST 72 (H) 03/28/2018    INR 1.7 03/28/2018     Lab Results   Component Value Date    INR 1.7 03/28/2018    INR 2.2 03/27/2018    INR 3.2 03/26/2018    PROTIME 19.1 (H) 03/28/2018    PROTIME 25.2 (H) 03/27/2018    PROTIME 35.5 (H) 03/26/2018      Lab Results   Component Value Date    TSH 1.450 03/24/2018     Lab Results   Component Value Date    TRIG 65 03/24/2018    TRIG 133 01/19/2018    TRIG 119 03/23/2017     Lab Results   Component Value Date    HDL 41 03/24/2018    HDL 54 01/19/2018    HDL 62 03/23/2017     Lab Results   Component Value Date    LDLCALC 32 03/24/2018    LDLCALC 68 01/19/2018    LDLCALC 64 03/23/2017     Lab Results   Component Value Date    LABA1C 5.3 03/23/2017       IMAGING:  Xr Chest Standard (2 Vw)    Result Date: 3/25/2018  Location:100 Exam: XR CHEST (2 VW) Comparison: March 23, 2018 History:  Short of breath Findings: PA and lateral views of the chest demonstrates cardiomegaly. No infiltrates effusions or vascular congestion. Cardiomegaly no change. Us Gallbladder Ruq    Result Date: 3/23/2018  Reading location:  200 CLINICAL STATEMENT: Abnormal liver function studies. TECHNIQUE: High-resolution sonography of the abdomen was performed with color-flow. FINDINGS: The gallbladder has been removed.  The liver is normal. essentially returned to her baseline. Her son was updated accordingly and the patient was deemed acceptable for discharge home. BRIEF PHYSICAL EXAMINATION AND LABORATORIES ON DAY OF DISCHARGE:  VITALS:  BP (!) 103/59   Pulse 88   Temp 97.5 °F (36.4 °C) (Oral)   Resp 18   Ht 4' 7\" (1.397 m)   Wt 132 lb 1.6 oz (59.9 kg)   SpO2 97%   BMI 30.70 kg/m²     HEENT:  PERRLA. EOMI. Sclera clear. Buccal mucosa moist.    Neck:  Supple. Trachea midline. No thyromegaly. No JVD. No bruits. Heart:  Rhythm regular, rate controlled. Systolic murmur. Lungs:  Symmetrical. Clear to auscultation bilaterally. No wheezes. No rhonchi. No rales. Abdomen: Soft. Non-tender. Non-distended. Bowel sounds positive. No organomegaly or masses. No pain on palpation    Extremities:  Peripheral pulses present. No peripheral edema. No ulcers. Neurologic:  Alert x 3. No focal deficit. Cranial nerves grossly intact. Skin:  No petechia. No hemorrhage. No wounds. DISPOSITION:  The patient's condition is good. At this time the patient is without objective evidence of an acute process requiring continuing hospitalization or inpatient management. They are stable for discharge with outpatient follow-up. I have spoken with the patient and discussed the results of the current hospitalization, in addition to providing specific details for the plan of care and counseling regarding the diagnosis and prognosis. The plan has been discussed in detail and they are aware of the specific conditions for emergent return, as well as the importance of follow-up.   Their questions are answered at this time and they are agreeable with the plan for discharge to home    DISCHARGE MEDICATIONS:    Hailee Garden   Home Medication Instructions SKW:[id number]    Printed on:03/28/18 1248   Medication Information                      carvedilol (COREG) 3.125 MG tablet  TAKE 1 TABLET BY MOUTH TWICE DAILY WITH MEALS             docusate

## 2018-03-29 ENCOUNTER — CARE COORDINATION (OUTPATIENT)
Dept: CASE MANAGEMENT | Age: 83
End: 2018-03-29

## 2018-03-29 DIAGNOSIS — I50.22 CHRONIC SYSTOLIC CONGESTIVE HEART FAILURE (HCC): Primary | Chronic | ICD-10-CM

## 2018-03-29 PROCEDURE — 1111F DSCHRG MED/CURRENT MED MERGE: CPT | Performed by: INTERNAL MEDICINE

## 2018-03-30 ENCOUNTER — TELEPHONE (OUTPATIENT)
Dept: INTERNAL MEDICINE CLINIC | Age: 83
End: 2018-03-30

## 2018-04-02 ENCOUNTER — TELEPHONE (OUTPATIENT)
Dept: INTERNAL MEDICINE CLINIC | Age: 83
End: 2018-04-02

## 2018-04-03 ENCOUNTER — CARE COORDINATION (OUTPATIENT)
Dept: CASE MANAGEMENT | Age: 83
End: 2018-04-03

## 2018-04-03 ENCOUNTER — OFFICE VISIT (OUTPATIENT)
Dept: INTERNAL MEDICINE CLINIC | Age: 83
End: 2018-04-03
Payer: MEDICARE

## 2018-04-03 VITALS
BODY MASS INDEX: 31.01 KG/M2 | WEIGHT: 134 LBS | HEART RATE: 60 BPM | TEMPERATURE: 97.9 F | DIASTOLIC BLOOD PRESSURE: 37 MMHG | HEIGHT: 55 IN | SYSTOLIC BLOOD PRESSURE: 94 MMHG | RESPIRATION RATE: 16 BRPM

## 2018-04-03 DIAGNOSIS — M19.90 ARTHRITIS: Chronic | ICD-10-CM

## 2018-04-03 DIAGNOSIS — E78.5 HYPERLIPIDEMIA, UNSPECIFIED HYPERLIPIDEMIA TYPE: Chronic | ICD-10-CM

## 2018-04-03 DIAGNOSIS — I10 ESSENTIAL HYPERTENSION: Chronic | ICD-10-CM

## 2018-04-03 DIAGNOSIS — I50.23 ACUTE ON CHRONIC SYSTOLIC CONGESTIVE HEART FAILURE (HCC): Chronic | ICD-10-CM

## 2018-04-03 DIAGNOSIS — I48.21 PERMANENT ATRIAL FIBRILLATION (HCC): Chronic | ICD-10-CM

## 2018-04-03 DIAGNOSIS — N18.4 STAGE 4 CHRONIC KIDNEY DISEASE (HCC): Primary | Chronic | ICD-10-CM

## 2018-04-03 PROCEDURE — 1036F TOBACCO NON-USER: CPT | Performed by: INTERNAL MEDICINE

## 2018-04-03 PROCEDURE — 4040F PNEUMOC VAC/ADMIN/RCVD: CPT | Performed by: INTERNAL MEDICINE

## 2018-04-03 PROCEDURE — G8427 DOCREV CUR MEDS BY ELIG CLIN: HCPCS | Performed by: INTERNAL MEDICINE

## 2018-04-03 PROCEDURE — 81000 URINALYSIS NONAUTO W/SCOPE: CPT | Performed by: INTERNAL MEDICINE

## 2018-04-03 PROCEDURE — 1090F PRES/ABSN URINE INCON ASSESS: CPT | Performed by: INTERNAL MEDICINE

## 2018-04-03 PROCEDURE — 1123F ACP DISCUSS/DSCN MKR DOCD: CPT | Performed by: INTERNAL MEDICINE

## 2018-04-03 PROCEDURE — 1111F DSCHRG MED/CURRENT MED MERGE: CPT | Performed by: INTERNAL MEDICINE

## 2018-04-03 PROCEDURE — 99213 OFFICE O/P EST LOW 20 MIN: CPT | Performed by: INTERNAL MEDICINE

## 2018-04-03 PROCEDURE — G8417 CALC BMI ABV UP PARAM F/U: HCPCS | Performed by: INTERNAL MEDICINE

## 2018-04-03 ASSESSMENT — ENCOUNTER SYMPTOMS
CONSTIPATION: 0
ABDOMINAL PAIN: 0
CHEST TIGHTNESS: 0
WHEEZING: 0
SHORTNESS OF BREATH: 0
SORE THROAT: 0
DIARRHEA: 0

## 2018-04-04 ENCOUNTER — TELEPHONE (OUTPATIENT)
Dept: INTERNAL MEDICINE CLINIC | Age: 83
End: 2018-04-04

## 2018-04-04 ENCOUNTER — TELEPHONE (OUTPATIENT)
Dept: INTERNAL MEDICINE CLINIC | Age: 83
End: 2018-04-04
Payer: MEDICARE

## 2018-04-04 DIAGNOSIS — I25.10 ATHEROSCLEROSIS OF NATIVE CORONARY ARTERY OF NATIVE HEART WITHOUT ANGINA PECTORIS: Primary | ICD-10-CM

## 2018-04-04 PROCEDURE — G0180 MD CERTIFICATION HHA PATIENT: HCPCS | Performed by: INTERNAL MEDICINE

## 2018-04-05 ENCOUNTER — HOSPITAL ENCOUNTER (OUTPATIENT)
Age: 83
Discharge: HOME | End: 2018-04-05
Payer: MEDICARE

## 2018-04-05 DIAGNOSIS — N18.4 STAGE 4 CHRONIC KIDNEY DISEASE (HCC): Chronic | ICD-10-CM

## 2018-04-05 LAB
ALBUMIN SERPL-MCNC: 3.5 G/DL (ref 3.5–5.2)
ALP BLD-CCNC: 110 U/L (ref 35–104)
ALT SERPL-CCNC: 38 U/L (ref 0–32)
ANION GAP SERPL CALCULATED.3IONS-SCNC: 17 MMOL/L (ref 7–16)
AST SERPL-CCNC: 21 U/L (ref 0–31)
BILIRUB SERPL-MCNC: 1 MG/DL (ref 0–1.2)
BUN BLDV-MCNC: 50 MG/DL (ref 8–23)
CALCIUM SERPL-MCNC: 9.1 MG/DL (ref 8.6–10.2)
CHLORIDE BLD-SCNC: 99 MMOL/L (ref 98–107)
CO2: 21 MMOL/L (ref 22–29)
CREAT SERPL-MCNC: 1.9 MG/DL (ref 0.5–1)
GFR AFRICAN AMERICAN: 30
GFR NON-AFRICAN AMERICAN: 25 ML/MIN/1.73
GLUCOSE BLD-MCNC: 119 MG/DL (ref 74–109)
HCT VFR BLD CALC: 39.9 % (ref 34–48)
HEMOGLOBIN: 12.3 G/DL (ref 11.5–15.5)
MCH RBC QN AUTO: 27.4 PG (ref 26–35)
MCHC RBC AUTO-ENTMCNC: 30.8 % (ref 32–34.5)
MCV RBC AUTO: 88.9 FL (ref 80–99.9)
PDW BLD-RTO: 15.9 FL (ref 11.5–15)
PLATELET # BLD: 160 E9/L (ref 130–450)
PMV BLD AUTO: 12.5 FL (ref 7–12)
POTASSIUM SERPL-SCNC: 4.9 MMOL/L (ref 3.5–5)
RBC # BLD: 4.49 E12/L (ref 3.5–5.5)
SODIUM BLD-SCNC: 137 MMOL/L (ref 132–146)
TOTAL PROTEIN: 6 G/DL (ref 6.4–8.3)
WBC # BLD: 9.3 E9/L (ref 4.5–11.5)

## 2018-04-05 PROCEDURE — 36415 COLL VENOUS BLD VENIPUNCTURE: CPT

## 2018-04-05 PROCEDURE — 85027 COMPLETE CBC AUTOMATED: CPT

## 2018-04-05 PROCEDURE — 80053 COMPREHEN METABOLIC PANEL: CPT

## 2018-04-06 ENCOUNTER — APPOINTMENT (OUTPATIENT)
Dept: GENERAL RADIOLOGY | Age: 83
End: 2018-04-06
Payer: MEDICARE

## 2018-04-06 ENCOUNTER — TELEPHONE (OUTPATIENT)
Dept: INTERNAL MEDICINE CLINIC | Age: 83
End: 2018-04-06

## 2018-04-06 ENCOUNTER — HOSPITAL ENCOUNTER (EMERGENCY)
Age: 83
Discharge: HOME | End: 2018-04-06
Attending: EMERGENCY MEDICINE
Payer: MEDICARE

## 2018-04-06 VITALS
RESPIRATION RATE: 14 BRPM | TEMPERATURE: 97.5 F | BODY MASS INDEX: 30.09 KG/M2 | OXYGEN SATURATION: 99 % | HEIGHT: 55 IN | HEART RATE: 83 BPM | DIASTOLIC BLOOD PRESSURE: 59 MMHG | WEIGHT: 130 LBS | SYSTOLIC BLOOD PRESSURE: 106 MMHG

## 2018-04-06 DIAGNOSIS — R79.1 SUPRATHERAPEUTIC INR: ICD-10-CM

## 2018-04-06 DIAGNOSIS — R60.0 BILATERAL LEG EDEMA: Primary | ICD-10-CM

## 2018-04-06 DIAGNOSIS — N18.9 CHRONIC KIDNEY DISEASE, UNSPECIFIED CKD STAGE: ICD-10-CM

## 2018-04-06 LAB
ANION GAP SERPL CALCULATED.3IONS-SCNC: 17 MMOL/L (ref 7–16)
BASOPHILS ABSOLUTE: 0.04 E9/L (ref 0–0.2)
BASOPHILS RELATIVE PERCENT: 0.5 % (ref 0–2)
BUN BLDV-MCNC: 52 MG/DL (ref 8–23)
BURR CELLS: ABNORMAL
CALCIUM SERPL-MCNC: 8.7 MG/DL (ref 8.6–10.2)
CHLORIDE BLD-SCNC: 99 MMOL/L (ref 98–107)
CHP ED QC CHECK: YES
CO2: 21 MMOL/L (ref 22–29)
CREAT SERPL-MCNC: 1.8 MG/DL (ref 0.5–1)
EKG ATRIAL RATE: 84 BPM
EKG Q-T INTERVAL: 384 MS
EKG QRS DURATION: 88 MS
EKG QTC CALCULATION (BAZETT): 446 MS
EKG R AXIS: -19 DEGREES
EKG T AXIS: 54 DEGREES
EKG VENTRICULAR RATE: 81 BPM
EOSINOPHILS ABSOLUTE: 0.04 E9/L (ref 0.05–0.5)
EOSINOPHILS RELATIVE PERCENT: 0.5 % (ref 0–6)
GFR AFRICAN AMERICAN: 32
GFR NON-AFRICAN AMERICAN: 27 ML/MIN/1.73
GLUCOSE BLD-MCNC: 120 MG/DL
GLUCOSE BLD-MCNC: 128 MG/DL (ref 74–109)
HCT VFR BLD CALC: 41.7 % (ref 34–48)
HEMOGLOBIN: 13 G/DL (ref 11.5–15.5)
INR BLD: 3.8
LYMPHOCYTES ABSOLUTE: 0.45 E9/L (ref 1.5–4)
LYMPHOCYTES RELATIVE PERCENT: 5 % (ref 20–42)
MCH RBC QN AUTO: 27.5 PG (ref 26–35)
MCHC RBC AUTO-ENTMCNC: 31.2 % (ref 32–34.5)
MCV RBC AUTO: 88.2 FL (ref 80–99.9)
MONOCYTES ABSOLUTE: 0.36 E9/L (ref 0.1–0.95)
MONOCYTES RELATIVE PERCENT: 4 % (ref 2–12)
NEUTROPHILS ABSOLUTE: 8.01 E9/L (ref 1.8–7.3)
NEUTROPHILS RELATIVE PERCENT: 90 % (ref 43–80)
OVALOCYTES: ABNORMAL
PDW BLD-RTO: 15.9 FL (ref 11.5–15)
PLATELET # BLD: 159 E9/L (ref 130–450)
PMV BLD AUTO: 12.7 FL (ref 7–12)
POC ANION GAP: 14
POC BUN: 64
POC CHLORIDE: 105
POC CO2: 21
POC CREATININE: 1.8
POC POTASSIUM: 5
POC SODIUM: 135
POIKILOCYTES: ABNORMAL
POLYCHROMASIA: ABNORMAL
POTASSIUM SERPL-SCNC: 5.8 MMOL/L (ref 3.5–5)
PRO-BNP: ABNORMAL PG/ML (ref 0–450)
PROTHROMBIN TIME: 43.1 SEC (ref 9.3–12.4)
RBC # BLD: 4.73 E12/L (ref 3.5–5.5)
SODIUM BLD-SCNC: 137 MMOL/L (ref 132–146)
TARGET CELLS: ABNORMAL
TROPONIN: 0.01 NG/ML (ref 0–0.03)
WBC # BLD: 8.9 E9/L (ref 4.5–11.5)

## 2018-04-06 PROCEDURE — 99284 EMERGENCY DEPT VISIT MOD MDM: CPT

## 2018-04-06 PROCEDURE — 84484 ASSAY OF TROPONIN QUANT: CPT

## 2018-04-06 PROCEDURE — 85025 COMPLETE CBC W/AUTO DIFF WBC: CPT

## 2018-04-06 PROCEDURE — 85610 PROTHROMBIN TIME: CPT

## 2018-04-06 PROCEDURE — 36415 COLL VENOUS BLD VENIPUNCTURE: CPT

## 2018-04-06 PROCEDURE — 93005 ELECTROCARDIOGRAM TRACING: CPT | Performed by: EMERGENCY MEDICINE

## 2018-04-06 PROCEDURE — 80048 BASIC METABOLIC PNL TOTAL CA: CPT

## 2018-04-06 PROCEDURE — 71045 X-RAY EXAM CHEST 1 VIEW: CPT

## 2018-04-06 PROCEDURE — 83880 ASSAY OF NATRIURETIC PEPTIDE: CPT

## 2018-04-06 ASSESSMENT — ENCOUNTER SYMPTOMS
SORE THROAT: 0
NAUSEA: 0
COUGH: 0
BACK PAIN: 0
ABDOMINAL PAIN: 0
WHEEZING: 0
EYE PAIN: 0
SHORTNESS OF BREATH: 0
VOMITING: 0
DIARRHEA: 0

## 2018-04-10 ENCOUNTER — OFFICE VISIT (OUTPATIENT)
Dept: INTERNAL MEDICINE CLINIC | Age: 83
End: 2018-04-10
Payer: MEDICARE

## 2018-04-10 VITALS
SYSTOLIC BLOOD PRESSURE: 125 MMHG | TEMPERATURE: 97.4 F | HEART RATE: 54 BPM | RESPIRATION RATE: 16 BRPM | HEIGHT: 55 IN | WEIGHT: 133 LBS | DIASTOLIC BLOOD PRESSURE: 51 MMHG | BODY MASS INDEX: 30.78 KG/M2

## 2018-04-10 DIAGNOSIS — E78.5 HYPERLIPIDEMIA, UNSPECIFIED HYPERLIPIDEMIA TYPE: Chronic | ICD-10-CM

## 2018-04-10 DIAGNOSIS — M19.90 ARTHRITIS: Chronic | ICD-10-CM

## 2018-04-10 DIAGNOSIS — I34.0 MITRAL VALVE INSUFFICIENCY, UNSPECIFIED ETIOLOGY: Chronic | ICD-10-CM

## 2018-04-10 DIAGNOSIS — I50.41 ACUTE COMBINED SYSTOLIC AND DIASTOLIC CONGESTIVE HEART FAILURE (HCC): Primary | ICD-10-CM

## 2018-04-10 DIAGNOSIS — I48.21 PERMANENT ATRIAL FIBRILLATION (HCC): Chronic | ICD-10-CM

## 2018-04-10 DIAGNOSIS — N18.4 STAGE 4 CHRONIC KIDNEY DISEASE (HCC): Chronic | ICD-10-CM

## 2018-04-10 PROCEDURE — 99213 OFFICE O/P EST LOW 20 MIN: CPT | Performed by: INTERNAL MEDICINE

## 2018-04-10 PROCEDURE — G8417 CALC BMI ABV UP PARAM F/U: HCPCS | Performed by: INTERNAL MEDICINE

## 2018-04-10 PROCEDURE — 99212 OFFICE O/P EST SF 10 MIN: CPT | Performed by: INTERNAL MEDICINE

## 2018-04-10 PROCEDURE — 1036F TOBACCO NON-USER: CPT | Performed by: INTERNAL MEDICINE

## 2018-04-10 PROCEDURE — G8598 ASA/ANTIPLAT THER USED: HCPCS | Performed by: INTERNAL MEDICINE

## 2018-04-10 PROCEDURE — 1111F DSCHRG MED/CURRENT MED MERGE: CPT | Performed by: INTERNAL MEDICINE

## 2018-04-10 PROCEDURE — 4040F PNEUMOC VAC/ADMIN/RCVD: CPT | Performed by: INTERNAL MEDICINE

## 2018-04-10 PROCEDURE — G8427 DOCREV CUR MEDS BY ELIG CLIN: HCPCS | Performed by: INTERNAL MEDICINE

## 2018-04-10 PROCEDURE — 1090F PRES/ABSN URINE INCON ASSESS: CPT | Performed by: INTERNAL MEDICINE

## 2018-04-10 PROCEDURE — 1123F ACP DISCUSS/DSCN MKR DOCD: CPT | Performed by: INTERNAL MEDICINE

## 2018-04-10 RX ORDER — BUMETANIDE 1 MG/1
1 TABLET ORAL DAILY
COMMUNITY
Start: 2018-04-09 | End: 2019-11-12 | Stop reason: SDUPTHER

## 2018-04-11 ENCOUNTER — TELEPHONE (OUTPATIENT)
Dept: INTERNAL MEDICINE CLINIC | Age: 83
End: 2018-04-11

## 2018-04-13 ENCOUNTER — HOSPITAL ENCOUNTER (OUTPATIENT)
Dept: WOUND CARE | Age: 83
Discharge: HOME | End: 2018-04-13

## 2018-04-13 ENCOUNTER — CARE COORDINATION (OUTPATIENT)
Dept: CASE MANAGEMENT | Age: 83
End: 2018-04-13

## 2018-04-18 ENCOUNTER — HOSPITAL ENCOUNTER (OUTPATIENT)
Dept: WOUND CARE | Age: 83
Discharge: HOME | End: 2018-04-18
Payer: MEDICARE

## 2018-04-18 VITALS
TEMPERATURE: 97.6 F | SYSTOLIC BLOOD PRESSURE: 64 MMHG | HEART RATE: 80 BPM | WEIGHT: 124 LBS | HEIGHT: 55 IN | DIASTOLIC BLOOD PRESSURE: 40 MMHG | BODY MASS INDEX: 28.7 KG/M2 | RESPIRATION RATE: 18 BRPM

## 2018-04-18 PROCEDURE — 99213 OFFICE O/P EST LOW 20 MIN: CPT

## 2018-04-18 PROCEDURE — 99203 OFFICE O/P NEW LOW 30 MIN: CPT | Performed by: FAMILY MEDICINE

## 2018-04-20 ENCOUNTER — TELEPHONE (OUTPATIENT)
Dept: INTERNAL MEDICINE CLINIC | Age: 83
End: 2018-04-20

## 2018-04-24 ENCOUNTER — OFFICE VISIT (OUTPATIENT)
Dept: INTERNAL MEDICINE CLINIC | Age: 83
End: 2018-04-24
Payer: MEDICARE

## 2018-04-24 VITALS
BODY MASS INDEX: 29.3 KG/M2 | TEMPERATURE: 98.2 F | HEART RATE: 62 BPM | SYSTOLIC BLOOD PRESSURE: 110 MMHG | DIASTOLIC BLOOD PRESSURE: 43 MMHG | HEIGHT: 55 IN | WEIGHT: 126.6 LBS | RESPIRATION RATE: 16 BRPM

## 2018-04-24 DIAGNOSIS — I48.21 PERMANENT ATRIAL FIBRILLATION (HCC): Chronic | ICD-10-CM

## 2018-04-24 DIAGNOSIS — M19.90 ARTHRITIS: Chronic | ICD-10-CM

## 2018-04-24 DIAGNOSIS — I34.0 MITRAL VALVE INSUFFICIENCY, UNSPECIFIED ETIOLOGY: Chronic | ICD-10-CM

## 2018-04-24 DIAGNOSIS — I50.22 CHRONIC SYSTOLIC CONGESTIVE HEART FAILURE (HCC): Primary | Chronic | ICD-10-CM

## 2018-04-24 DIAGNOSIS — E78.5 HYPERLIPIDEMIA, UNSPECIFIED HYPERLIPIDEMIA TYPE: Chronic | ICD-10-CM

## 2018-04-24 PROCEDURE — G8598 ASA/ANTIPLAT THER USED: HCPCS | Performed by: INTERNAL MEDICINE

## 2018-04-24 PROCEDURE — G8417 CALC BMI ABV UP PARAM F/U: HCPCS | Performed by: INTERNAL MEDICINE

## 2018-04-24 PROCEDURE — 4040F PNEUMOC VAC/ADMIN/RCVD: CPT | Performed by: INTERNAL MEDICINE

## 2018-04-24 PROCEDURE — 1111F DSCHRG MED/CURRENT MED MERGE: CPT | Performed by: INTERNAL MEDICINE

## 2018-04-24 PROCEDURE — 1123F ACP DISCUSS/DSCN MKR DOCD: CPT | Performed by: INTERNAL MEDICINE

## 2018-04-24 PROCEDURE — G8427 DOCREV CUR MEDS BY ELIG CLIN: HCPCS | Performed by: INTERNAL MEDICINE

## 2018-04-24 PROCEDURE — 99213 OFFICE O/P EST LOW 20 MIN: CPT | Performed by: INTERNAL MEDICINE

## 2018-04-24 PROCEDURE — 1036F TOBACCO NON-USER: CPT | Performed by: INTERNAL MEDICINE

## 2018-04-24 PROCEDURE — 1090F PRES/ABSN URINE INCON ASSESS: CPT | Performed by: INTERNAL MEDICINE

## 2018-04-24 PROCEDURE — 99212 OFFICE O/P EST SF 10 MIN: CPT | Performed by: INTERNAL MEDICINE

## 2018-04-24 RX ORDER — TRAMADOL HYDROCHLORIDE 50 MG/1
50 TABLET ORAL 2 TIMES DAILY
Qty: 60 TABLET | Refills: 0 | Status: SHIPPED | OUTPATIENT
Start: 2018-04-24 | End: 2018-06-05 | Stop reason: SDUPTHER

## 2018-05-16 ENCOUNTER — TELEPHONE (OUTPATIENT)
Dept: INTERNAL MEDICINE CLINIC | Age: 83
End: 2018-05-16

## 2018-05-29 ENCOUNTER — HOSPITAL ENCOUNTER (OUTPATIENT)
Age: 83
Discharge: HOME | End: 2018-05-29
Payer: MEDICARE

## 2018-05-29 ENCOUNTER — TELEPHONE (OUTPATIENT)
Dept: INTERNAL MEDICINE CLINIC | Age: 83
End: 2018-05-29

## 2018-05-29 DIAGNOSIS — I50.22 CHRONIC SYSTOLIC CONGESTIVE HEART FAILURE (HCC): Chronic | ICD-10-CM

## 2018-05-29 LAB
ANION GAP SERPL CALCULATED.3IONS-SCNC: 15 MMOL/L (ref 7–16)
BASOPHILS ABSOLUTE: 0.06 E9/L (ref 0–0.2)
BASOPHILS RELATIVE PERCENT: 0.8 % (ref 0–2)
BUN BLDV-MCNC: 85 MG/DL (ref 8–23)
CALCIUM SERPL-MCNC: 8.9 MG/DL (ref 8.6–10.2)
CHLORIDE BLD-SCNC: 94 MMOL/L (ref 98–107)
CO2: 24 MMOL/L (ref 22–29)
CREAT SERPL-MCNC: 2.6 MG/DL (ref 0.5–1)
EOSINOPHILS ABSOLUTE: 0.28 E9/L (ref 0.05–0.5)
EOSINOPHILS RELATIVE PERCENT: 3.9 % (ref 0–6)
GFR AFRICAN AMERICAN: 21
GFR NON-AFRICAN AMERICAN: 17 ML/MIN/1.73
GLUCOSE BLD-MCNC: 113 MG/DL (ref 74–109)
HCT VFR BLD CALC: 38.3 % (ref 34–48)
HEMOGLOBIN: 12.2 G/DL (ref 11.5–15.5)
IMMATURE GRANULOCYTES #: 0.03 E9/L
IMMATURE GRANULOCYTES %: 0.4 % (ref 0–5)
INR BLD: 7.4
LYMPHOCYTES ABSOLUTE: 1.01 E9/L (ref 1.5–4)
LYMPHOCYTES RELATIVE PERCENT: 14.2 % (ref 20–42)
MCH RBC QN AUTO: 26 PG (ref 26–35)
MCHC RBC AUTO-ENTMCNC: 31.9 % (ref 32–34.5)
MCV RBC AUTO: 81.5 FL (ref 80–99.9)
MONOCYTES ABSOLUTE: 0.54 E9/L (ref 0.1–0.95)
MONOCYTES RELATIVE PERCENT: 7.6 % (ref 2–12)
NEUTROPHILS ABSOLUTE: 5.18 E9/L (ref 1.8–7.3)
NEUTROPHILS RELATIVE PERCENT: 73.1 % (ref 43–80)
PDW BLD-RTO: 18.8 FL (ref 11.5–15)
PLATELET # BLD: 210 E9/L (ref 130–450)
PMV BLD AUTO: 10.7 FL (ref 7–12)
POTASSIUM SERPL-SCNC: 4.9 MMOL/L (ref 3.5–5)
PROTHROMBIN TIME: 82.3 SEC (ref 9.3–12.4)
RBC # BLD: 4.7 E12/L (ref 3.5–5.5)
SODIUM BLD-SCNC: 133 MMOL/L (ref 132–146)
WBC # BLD: 7.1 E9/L (ref 4.5–11.5)

## 2018-05-29 PROCEDURE — 85025 COMPLETE CBC W/AUTO DIFF WBC: CPT

## 2018-05-29 PROCEDURE — 80048 BASIC METABOLIC PNL TOTAL CA: CPT

## 2018-05-29 PROCEDURE — 85610 PROTHROMBIN TIME: CPT

## 2018-05-29 PROCEDURE — 36415 COLL VENOUS BLD VENIPUNCTURE: CPT

## 2018-06-01 ENCOUNTER — TELEPHONE (OUTPATIENT)
Dept: INTERNAL MEDICINE CLINIC | Age: 83
End: 2018-06-01

## 2018-06-01 ENCOUNTER — HOSPITAL ENCOUNTER (OUTPATIENT)
Age: 83
Discharge: HOME | End: 2018-06-01
Payer: MEDICARE

## 2018-06-01 LAB
INR BLD: 5.3
PROTHROMBIN TIME: 58.6 SEC (ref 9.3–12.4)

## 2018-06-01 PROCEDURE — 36415 COLL VENOUS BLD VENIPUNCTURE: CPT

## 2018-06-01 PROCEDURE — 85610 PROTHROMBIN TIME: CPT

## 2018-06-05 ENCOUNTER — OFFICE VISIT (OUTPATIENT)
Dept: INTERNAL MEDICINE CLINIC | Age: 83
End: 2018-06-05
Payer: MEDICARE

## 2018-06-05 VITALS
SYSTOLIC BLOOD PRESSURE: 73 MMHG | WEIGHT: 109.6 LBS | HEART RATE: 43 BPM | DIASTOLIC BLOOD PRESSURE: 42 MMHG | BODY MASS INDEX: 25.36 KG/M2 | HEIGHT: 55 IN

## 2018-06-05 DIAGNOSIS — I50.22 CHRONIC SYSTOLIC CONGESTIVE HEART FAILURE (HCC): Primary | Chronic | ICD-10-CM

## 2018-06-05 DIAGNOSIS — M19.90 ARTHRITIS: Chronic | ICD-10-CM

## 2018-06-05 DIAGNOSIS — I48.21 PERMANENT ATRIAL FIBRILLATION (HCC): Chronic | ICD-10-CM

## 2018-06-05 DIAGNOSIS — N18.4 STAGE 4 CHRONIC KIDNEY DISEASE (HCC): Chronic | ICD-10-CM

## 2018-06-05 DIAGNOSIS — I34.0 MITRAL VALVE INSUFFICIENCY, UNSPECIFIED ETIOLOGY: Chronic | ICD-10-CM

## 2018-06-05 DIAGNOSIS — I95.9 HYPOTENSION, UNSPECIFIED HYPOTENSION TYPE: ICD-10-CM

## 2018-06-05 DIAGNOSIS — E78.5 HYPERLIPIDEMIA, UNSPECIFIED HYPERLIPIDEMIA TYPE: Chronic | ICD-10-CM

## 2018-06-05 DIAGNOSIS — N17.9 ACUTE KIDNEY INJURY SUPERIMPOSED ON CHRONIC KIDNEY DISEASE (HCC): ICD-10-CM

## 2018-06-05 DIAGNOSIS — N18.9 ACUTE KIDNEY INJURY SUPERIMPOSED ON CHRONIC KIDNEY DISEASE (HCC): ICD-10-CM

## 2018-06-05 PROCEDURE — 4040F PNEUMOC VAC/ADMIN/RCVD: CPT | Performed by: INTERNAL MEDICINE

## 2018-06-05 PROCEDURE — 99212 OFFICE O/P EST SF 10 MIN: CPT | Performed by: INTERNAL MEDICINE

## 2018-06-05 PROCEDURE — 1090F PRES/ABSN URINE INCON ASSESS: CPT | Performed by: INTERNAL MEDICINE

## 2018-06-05 PROCEDURE — 1123F ACP DISCUSS/DSCN MKR DOCD: CPT | Performed by: INTERNAL MEDICINE

## 2018-06-05 PROCEDURE — G8417 CALC BMI ABV UP PARAM F/U: HCPCS | Performed by: INTERNAL MEDICINE

## 2018-06-05 PROCEDURE — G8598 ASA/ANTIPLAT THER USED: HCPCS | Performed by: INTERNAL MEDICINE

## 2018-06-05 PROCEDURE — 1036F TOBACCO NON-USER: CPT | Performed by: INTERNAL MEDICINE

## 2018-06-05 PROCEDURE — 99213 OFFICE O/P EST LOW 20 MIN: CPT | Performed by: INTERNAL MEDICINE

## 2018-06-05 PROCEDURE — G8427 DOCREV CUR MEDS BY ELIG CLIN: HCPCS | Performed by: INTERNAL MEDICINE

## 2018-06-05 RX ORDER — TRAMADOL HYDROCHLORIDE 50 MG/1
50 TABLET ORAL EVERY 8 HOURS PRN
Qty: 90 TABLET | Refills: 0 | Status: SHIPPED | OUTPATIENT
Start: 2018-06-05 | End: 2018-07-05

## 2018-06-06 ENCOUNTER — TELEPHONE (OUTPATIENT)
Dept: INTERNAL MEDICINE CLINIC | Age: 83
End: 2018-06-06

## 2018-06-06 ENCOUNTER — HOSPITAL ENCOUNTER (OUTPATIENT)
Age: 83
Discharge: HOME | End: 2018-06-06
Payer: MEDICARE

## 2018-06-06 LAB
INR BLD: 1.6
PROTHROMBIN TIME: 18 SEC (ref 9.3–12.4)

## 2018-06-06 PROCEDURE — 85610 PROTHROMBIN TIME: CPT

## 2018-06-06 PROCEDURE — 36415 COLL VENOUS BLD VENIPUNCTURE: CPT

## 2018-06-07 ENCOUNTER — TELEPHONE (OUTPATIENT)
Dept: INTERNAL MEDICINE CLINIC | Age: 83
End: 2018-06-07

## 2018-06-11 ENCOUNTER — HOSPITAL ENCOUNTER (OUTPATIENT)
Age: 83
Discharge: HOME | End: 2018-06-11
Payer: MEDICARE

## 2018-06-11 DIAGNOSIS — N17.9 ACUTE KIDNEY INJURY SUPERIMPOSED ON CHRONIC KIDNEY DISEASE (HCC): ICD-10-CM

## 2018-06-11 DIAGNOSIS — N18.9 ACUTE KIDNEY INJURY SUPERIMPOSED ON CHRONIC KIDNEY DISEASE (HCC): ICD-10-CM

## 2018-06-11 LAB
ANION GAP SERPL CALCULATED.3IONS-SCNC: 14 MMOL/L (ref 7–16)
BUN BLDV-MCNC: 58 MG/DL (ref 8–23)
CALCIUM SERPL-MCNC: 9 MG/DL (ref 8.6–10.2)
CHLORIDE BLD-SCNC: 98 MMOL/L (ref 98–107)
CO2: 23 MMOL/L (ref 22–29)
CREAT SERPL-MCNC: 1.8 MG/DL (ref 0.5–1)
GFR AFRICAN AMERICAN: 32
GFR NON-AFRICAN AMERICAN: 27 ML/MIN/1.73
GLUCOSE BLD-MCNC: 115 MG/DL (ref 74–109)
INR BLD: 1.2
POTASSIUM SERPL-SCNC: 4.2 MMOL/L (ref 3.5–5)
PROTHROMBIN TIME: 13.5 SEC (ref 9.3–12.4)
SODIUM BLD-SCNC: 135 MMOL/L (ref 132–146)

## 2018-06-11 PROCEDURE — 85610 PROTHROMBIN TIME: CPT

## 2018-06-11 PROCEDURE — 36415 COLL VENOUS BLD VENIPUNCTURE: CPT

## 2018-06-11 PROCEDURE — 80048 BASIC METABOLIC PNL TOTAL CA: CPT

## 2018-06-12 ENCOUNTER — TELEPHONE (OUTPATIENT)
Dept: INTERNAL MEDICINE CLINIC | Age: 83
End: 2018-06-12

## 2018-06-19 ENCOUNTER — HOSPITAL ENCOUNTER (OUTPATIENT)
Age: 83
Discharge: HOME | End: 2018-06-19
Payer: MEDICARE

## 2018-06-19 LAB
INR BLD: 2
PROTHROMBIN TIME: 22.4 SEC (ref 9.3–12.4)

## 2018-06-19 PROCEDURE — 85610 PROTHROMBIN TIME: CPT

## 2018-06-19 PROCEDURE — 36415 COLL VENOUS BLD VENIPUNCTURE: CPT

## 2018-06-22 ENCOUNTER — TELEPHONE (OUTPATIENT)
Dept: INTERNAL MEDICINE CLINIC | Age: 83
End: 2018-06-22

## 2018-07-05 DIAGNOSIS — I50.21 ACUTE SYSTOLIC CONGESTIVE HEART FAILURE (HCC): ICD-10-CM

## 2018-07-05 DIAGNOSIS — I50.22 CHRONIC SYSTOLIC CONGESTIVE HEART FAILURE (HCC): Chronic | ICD-10-CM

## 2018-07-05 RX ORDER — SPIRONOLACTONE 25 MG/1
25 TABLET ORAL DAILY
Qty: 30 TABLET | Refills: 2 | Status: SHIPPED | OUTPATIENT
Start: 2018-07-05 | End: 2018-09-25 | Stop reason: SDUPTHER

## 2018-07-16 ENCOUNTER — HOSPITAL ENCOUNTER (OUTPATIENT)
Age: 83
Discharge: HOME | End: 2018-07-16
Payer: MEDICARE

## 2018-07-16 LAB
INR BLD: 4.8
PROTHROMBIN TIME: 53.1 SEC (ref 9.3–12.4)

## 2018-07-16 PROCEDURE — 36415 COLL VENOUS BLD VENIPUNCTURE: CPT

## 2018-07-16 PROCEDURE — 85610 PROTHROMBIN TIME: CPT

## 2018-07-17 ENCOUNTER — OFFICE VISIT (OUTPATIENT)
Dept: INTERNAL MEDICINE CLINIC | Age: 83
End: 2018-07-17
Payer: MEDICARE

## 2018-07-17 VITALS
HEART RATE: 72 BPM | WEIGHT: 110 LBS | BODY MASS INDEX: 25.46 KG/M2 | SYSTOLIC BLOOD PRESSURE: 86 MMHG | DIASTOLIC BLOOD PRESSURE: 42 MMHG | HEIGHT: 55 IN | TEMPERATURE: 97.3 F

## 2018-07-17 DIAGNOSIS — I48.21 PERMANENT ATRIAL FIBRILLATION (HCC): Chronic | ICD-10-CM

## 2018-07-17 DIAGNOSIS — I34.0 MITRAL VALVE INSUFFICIENCY, UNSPECIFIED ETIOLOGY: Chronic | ICD-10-CM

## 2018-07-17 DIAGNOSIS — M19.90 ARTHRITIS: Primary | Chronic | ICD-10-CM

## 2018-07-17 DIAGNOSIS — E78.5 HYPERLIPIDEMIA, UNSPECIFIED HYPERLIPIDEMIA TYPE: Chronic | ICD-10-CM

## 2018-07-17 DIAGNOSIS — I50.41 ACUTE COMBINED SYSTOLIC AND DIASTOLIC CONGESTIVE HEART FAILURE (HCC): ICD-10-CM

## 2018-07-17 PROCEDURE — G8417 CALC BMI ABV UP PARAM F/U: HCPCS | Performed by: INTERNAL MEDICINE

## 2018-07-17 PROCEDURE — 1101F PT FALLS ASSESS-DOCD LE1/YR: CPT | Performed by: INTERNAL MEDICINE

## 2018-07-17 PROCEDURE — G8598 ASA/ANTIPLAT THER USED: HCPCS | Performed by: INTERNAL MEDICINE

## 2018-07-17 PROCEDURE — 1090F PRES/ABSN URINE INCON ASSESS: CPT | Performed by: INTERNAL MEDICINE

## 2018-07-17 PROCEDURE — 4040F PNEUMOC VAC/ADMIN/RCVD: CPT | Performed by: INTERNAL MEDICINE

## 2018-07-17 PROCEDURE — 1123F ACP DISCUSS/DSCN MKR DOCD: CPT | Performed by: INTERNAL MEDICINE

## 2018-07-17 PROCEDURE — 99212 OFFICE O/P EST SF 10 MIN: CPT | Performed by: INTERNAL MEDICINE

## 2018-07-17 PROCEDURE — 99213 OFFICE O/P EST LOW 20 MIN: CPT | Performed by: INTERNAL MEDICINE

## 2018-07-17 PROCEDURE — G8427 DOCREV CUR MEDS BY ELIG CLIN: HCPCS | Performed by: INTERNAL MEDICINE

## 2018-07-17 PROCEDURE — 1036F TOBACCO NON-USER: CPT | Performed by: INTERNAL MEDICINE

## 2018-07-17 RX ORDER — TRAMADOL HYDROCHLORIDE 50 MG/1
50 TABLET ORAL 2 TIMES DAILY
COMMUNITY
End: 2018-07-17 | Stop reason: SDUPTHER

## 2018-07-17 RX ORDER — TRAMADOL HYDROCHLORIDE 50 MG/1
50 TABLET ORAL 2 TIMES DAILY
Qty: 60 TABLET | Refills: 0 | Status: SHIPPED | OUTPATIENT
Start: 2018-07-17 | End: 2018-08-14 | Stop reason: SDUPTHER

## 2018-07-17 NOTE — PROGRESS NOTES
palpitations, paroxysmal nocturnal dyspnea  Respiratory:  Shortness of breath, coughing, sputum production, hemoptysis, wheezing, orthopnea  Gastrointestinal:  Nausea, vomiting, diarrhea, heartburn, constipation, abdominal pain, hematemesis, hematochezia, melena, acholic stools  Genito-Urinary:  Dysuria, urgency, frequency, hematuria  Musculoskeletal:  Joint pain, joint stiffness, joint swelling, muscle pain  Neurology:  Headache, focal neurological deficits, weakness, numbness, paresthesia  Derm:  Rashes, ulcers, excoriations, bruising  Extremities:  Decreased ROM, peripheral edema, mottling    Physical Examination  Vitals:  BP (!) 86/42   Pulse 72   Temp 97.3 °F (36.3 °C) (Oral)   Ht 4' 7\" (1.397 m)   Wt 110 lb (49.9 kg)   Breastfeeding? No   BMI 25.57 kg/m²   General Appearance:  awake, alert, and oriented to person, place, time, and purpose; appears stated age and cooperative; no apparent distress no labored breathing  HEENT:  NCAT; PERRL; conjunctiva pink, sclera clear  Neck:  no adenopathy, bruit, JVD, tenderness, masses, or nodules; supple, symmetrical, trachea midline, thyroid not enlarged  Lung:  clear to auscultation bilaterally; no use of accessory muscles; no rhonchi, rales, or crackles  Heart:  Irregularly irregular without rub, or gallop + systolic murmur  Abdomen:  soft, nontender, nondistended; normoactive bowel sounds; no organomegaly  Extremities: trace edema to bilateral lower extremities. No compression stockings. Venous stasis  Musculokeletal:  no joint swelling, no muscle tenderness. ROM normal in all joints of extremities. Neurologic:  mental status A&Ox3, thought content appropriate; CN II-XII grossly intact; sensation intact, motor strength 5/5 globally; no slurred speech  Osteopathic:  Patient examined in seated position; normal lumbar lordosis and thoracic kyphosis; no TART changes to paraspinal musculature    Laboratory Data -   I have reviewed all the lab results.  There are some better since change to bumex. She has lost ~10 pounds per family. Her edema has decreased but is still present. We will continue current plan of care. BMP and CBC for next visit in 1 month. We will assess kidney function with bumex/tramadol. Greater than 50% of the time during this encounter was spent face-to-face with the patient, counseling them regarding their medical conditions as well as reviewing all pertinent laboratory findings and imaging studies. The patient was discussed with Dr. Nanette Mcclure today.     [facility] Re[facility] DO SANJUANA PennintgonYII  7/17/2018  1:25 PM

## 2018-07-20 ENCOUNTER — HOSPITAL ENCOUNTER (OUTPATIENT)
Age: 83
Discharge: HOME | End: 2018-07-20
Payer: MEDICARE

## 2018-07-20 LAB
INR BLD: 2.5
PROTHROMBIN TIME: 28.7 SEC (ref 9.3–12.4)

## 2018-07-20 PROCEDURE — 85610 PROTHROMBIN TIME: CPT

## 2018-07-20 PROCEDURE — 36415 COLL VENOUS BLD VENIPUNCTURE: CPT

## 2018-07-23 ENCOUNTER — TELEPHONE (OUTPATIENT)
Dept: INTERNAL MEDICINE CLINIC | Age: 83
End: 2018-07-23

## 2018-07-31 DIAGNOSIS — E78.5 HYPERLIPIDEMIA, UNSPECIFIED HYPERLIPIDEMIA TYPE: Chronic | ICD-10-CM

## 2018-07-31 DIAGNOSIS — I10 ESSENTIAL HYPERTENSION: Chronic | ICD-10-CM

## 2018-07-31 DIAGNOSIS — I50.21 ACUTE SYSTOLIC CONGESTIVE HEART FAILURE (HCC): ICD-10-CM

## 2018-07-31 RX ORDER — SIMVASTATIN 40 MG
40 TABLET ORAL NIGHTLY
Qty: 30 TABLET | Refills: 3 | Status: SHIPPED | OUTPATIENT
Start: 2018-07-31 | End: 2018-10-02 | Stop reason: SDUPTHER

## 2018-07-31 RX ORDER — CARVEDILOL 3.12 MG/1
TABLET ORAL
Qty: 60 TABLET | Refills: 3 | Status: SHIPPED | OUTPATIENT
Start: 2018-07-31 | End: 2018-10-02 | Stop reason: SDUPTHER

## 2018-08-13 ENCOUNTER — HOSPITAL ENCOUNTER (OUTPATIENT)
Age: 83
Discharge: HOME | End: 2018-08-13
Payer: MEDICARE

## 2018-08-13 LAB
INR BLD: 3.7
PROTHROMBIN TIME: 41.6 SEC (ref 9.3–12.4)

## 2018-08-13 PROCEDURE — 85610 PROTHROMBIN TIME: CPT

## 2018-08-13 PROCEDURE — 36415 COLL VENOUS BLD VENIPUNCTURE: CPT

## 2018-08-14 ENCOUNTER — OFFICE VISIT (OUTPATIENT)
Dept: INTERNAL MEDICINE CLINIC | Age: 83
End: 2018-08-14
Payer: MEDICARE

## 2018-08-14 VITALS
DIASTOLIC BLOOD PRESSURE: 41 MMHG | WEIGHT: 107 LBS | SYSTOLIC BLOOD PRESSURE: 76 MMHG | OXYGEN SATURATION: 98 % | BODY MASS INDEX: 24.76 KG/M2 | TEMPERATURE: 97.9 F | HEIGHT: 55 IN | HEART RATE: 58 BPM

## 2018-08-14 DIAGNOSIS — E78.5 HYPERLIPIDEMIA, UNSPECIFIED HYPERLIPIDEMIA TYPE: Chronic | ICD-10-CM

## 2018-08-14 DIAGNOSIS — I50.42 CHRONIC COMBINED SYSTOLIC AND DIASTOLIC CONGESTIVE HEART FAILURE (HCC): Chronic | ICD-10-CM

## 2018-08-14 DIAGNOSIS — I48.21 PERMANENT ATRIAL FIBRILLATION (HCC): Chronic | ICD-10-CM

## 2018-08-14 DIAGNOSIS — Z00.00 ROUTINE HEALTH MAINTENANCE: Primary | ICD-10-CM

## 2018-08-14 DIAGNOSIS — M19.90 ARTHRITIS: Chronic | ICD-10-CM

## 2018-08-14 PROCEDURE — 1123F ACP DISCUSS/DSCN MKR DOCD: CPT | Performed by: INTERNAL MEDICINE

## 2018-08-14 PROCEDURE — 99212 OFFICE O/P EST SF 10 MIN: CPT | Performed by: INTERNAL MEDICINE

## 2018-08-14 PROCEDURE — G8598 ASA/ANTIPLAT THER USED: HCPCS | Performed by: INTERNAL MEDICINE

## 2018-08-14 PROCEDURE — 4040F PNEUMOC VAC/ADMIN/RCVD: CPT | Performed by: INTERNAL MEDICINE

## 2018-08-14 PROCEDURE — G8427 DOCREV CUR MEDS BY ELIG CLIN: HCPCS | Performed by: INTERNAL MEDICINE

## 2018-08-14 PROCEDURE — 99213 OFFICE O/P EST LOW 20 MIN: CPT | Performed by: INTERNAL MEDICINE

## 2018-08-14 PROCEDURE — 1090F PRES/ABSN URINE INCON ASSESS: CPT | Performed by: INTERNAL MEDICINE

## 2018-08-14 PROCEDURE — 1101F PT FALLS ASSESS-DOCD LE1/YR: CPT | Performed by: INTERNAL MEDICINE

## 2018-08-14 PROCEDURE — G8420 CALC BMI NORM PARAMETERS: HCPCS | Performed by: INTERNAL MEDICINE

## 2018-08-14 PROCEDURE — 1036F TOBACCO NON-USER: CPT | Performed by: INTERNAL MEDICINE

## 2018-08-14 RX ORDER — WARFARIN SODIUM 1 MG/1
1 TABLET ORAL DAILY
Qty: 30 TABLET | Refills: 3 | Status: SHIPPED | OUTPATIENT
Start: 2018-08-14 | End: 2018-08-14 | Stop reason: ALTCHOICE

## 2018-08-14 RX ORDER — TRAMADOL HYDROCHLORIDE 50 MG/1
50 TABLET ORAL 2 TIMES DAILY
Qty: 60 TABLET | Refills: 0 | Status: SHIPPED | OUTPATIENT
Start: 2018-08-14 | End: 2018-09-13

## 2018-08-14 ASSESSMENT — PATIENT HEALTH QUESTIONNAIRE - PHQ9
SUM OF ALL RESPONSES TO PHQ QUESTIONS 1-9: 0
SUM OF ALL RESPONSES TO PHQ9 QUESTIONS 1 & 2: 0
2. FEELING DOWN, DEPRESSED OR HOPELESS: 0
1. LITTLE INTEREST OR PLEASURE IN DOING THINGS: 0
SUM OF ALL RESPONSES TO PHQ QUESTIONS 1-9: 0

## 2018-08-14 NOTE — PROGRESS NOTES
[facility]  -Resident Progress Note-     Miya Jiang / : 1931 / MRN [id number] / Gerhardt Leghorn 2018    Reason for visit:    Chief Complaint   Patient presents with   [address] Maintenance     due for Tdap, shingles, and pneumo     Date of last visit:  18    Today's Visit  Miya Jiang is a 80 y.o. female who [address] today for follow up for CHF. 18  Agnes Ayala presents for routine follow up. She denies any acute complaints. She is hypotensive at 76/41. She has been hypotensive for past few visits. She denies any symptoms associated with this. She requests refills for tramadol. 18  Agnes Ayala presents for follow up. Her swelling and CHF symptoms continue to improve after starting Bumex. She remains hypotensive but improved from last visit. Her creatinine has improved to baseline. Her INR was found to be supratherapeutic at 4.8. She denies any acute complaints. Requests refill for tramadol. 18  Agnes Ayala presents for follow up. She has no complaints. She states that she feels better since starting Bumex. Her lower extremity edema has greatly improved. However, she has become hypotensive and is experiencing NANI. She has no symptoms including dizziness or weakness. Her INR has also been elevated for the past week. She has been instructed to hold her coumadin since . Repeat was 5.3. She was instructed to hold until  and get INR redrawn and instructiosn will be given once resulted. 18  Agnes Ayala presents for follow up from last visit. She states she has been feeling improved since starting the bumex. She has lost ~10 pounds. Bre Vanegas does admit to some episodes of mild confusion recently. She states that she has had no increased frequency, dysuria, hematuria. She states her breathing is much improved.          Review of Systems  All bolded are positive; please see HPI  General: Fever, chills, diaphoresis, fatigue, malaise, night sweats, weight loss  Psychological:  Anxiety, disorientation, hallucinations  ENT:  Epistaxis, headaches, vertigo, visual changes  Cardiovascular:  Chest pain, irregular heartbeats, palpitations, paroxysmal nocturnal dyspnea  Respiratory:  Shortness of breath, coughing, sputum production, hemoptysis, wheezing, orthopnea  Gastrointestinal:  Nausea, vomiting, diarrhea, heartburn, constipation, abdominal pain, hematemesis, hematochezia, melena, acholic stools  Genito-Urinary:  Dysuria, urgency, frequency, hematuria  Musculoskeletal:  Joint pain, joint stiffness, joint swelling, muscle pain  Neurology:  Headache, focal neurological deficits, weakness, numbness, paresthesia  Derm:  Rashes, ulcers, excoriations, bruising  Extremities:  Decreased ROM, peripheral edema, mottling    Physical Examination  Vitals:  BP (!) 76/41   Pulse 58   Temp 97.9 °F (36.6 °C) (Oral)   Ht 4' 7\" (1.397 m)   Wt 107 lb (48.5 kg)   SpO2 98%   Breastfeeding? No   BMI 24.87 kg/m²   General Appearance:  awake, alert, and oriented to person, place, time, and purpose; appears stated age and cooperative; no apparent distress no labored breathing  HEENT:  NCAT; PERRL; conjunctiva pink, sclera clear  Neck:  no adenopathy, bruit, JVD, tenderness, masses, or nodules; supple, symmetrical, trachea midline, thyroid not enlarged  Lung:  clear to auscultation bilaterally; no use of accessory muscles; no rhonchi, rales, or crackles  Heart:  Irregularly irregular without rub, or gallop + systolic murmur  Abdomen:  soft, nontender, nondistended; normoactive bowel sounds; no organomegaly  Extremities: trace edema to bilateral lower extremities. No compression stockings. Venous stasis  Musculokeletal:  no joint swelling, no muscle tenderness. ROM normal in all joints of extremities.    Neurologic:  mental status A&Ox3, thought content appropriate; CN II-XII grossly intact; sensation intact, motor strength consider changing to eliquis? Will ask Dr. Pasquale Thomas. 7/17/18  Dayan Her continues to improve with Bumex. Creatinine has improved. INR was found to be supratherapeutic at 4.8. She was told to hold her coumadin and be redrawn on Friday. Will make recommendations when results return. 6.5.18  Her lab results, including elevated creatinine, were reviewed. We have acknowledged her hypotension. She does have very poor ejection fraction. She and her family are aware of the seriousness of her condition. She was instructed to present to ER if she starts having symptoms of dizziness, syncope, weakness. No medications were currently changed. She reports feeling much improved since starting Bumex. She is instructed to obtain repeat BMP in one week. She will also have INR drawn tomorrow. Her coumadin remains on hold. I will await these results and instruct. 4.24.18  Patient states she has been feeling better since change to bumex. She has lost ~10 pounds per family. Her edema has decreased but is still present. We will continue current plan of care. BMP and CBC for next visit in 1 month. We will assess kidney function with bumex/tramadol. Greater than 50% of the time during this encounter was spent face-to-face with the patient, counseling them regarding their medical conditions as well as reviewing all pertinent laboratory findings and imaging studies. The patient was discussed with Dr. Kandis Harada today.     Blair Salinas DO PGYII  8/14/2018  1:24 PM

## 2018-09-12 ENCOUNTER — HOSPITAL ENCOUNTER (OUTPATIENT)
Age: 83
Discharge: HOME | End: 2018-09-12
Payer: MEDICARE

## 2018-09-12 DIAGNOSIS — Z00.00 ROUTINE HEALTH MAINTENANCE: ICD-10-CM

## 2018-09-12 LAB
ALBUMIN SERPL-MCNC: 4.2 G/DL (ref 3.5–5.2)
ALP BLD-CCNC: 79 U/L (ref 35–104)
ALT SERPL-CCNC: 9 U/L (ref 0–32)
ANION GAP SERPL CALCULATED.3IONS-SCNC: 18 MMOL/L (ref 7–16)
AST SERPL-CCNC: 22 U/L (ref 0–31)
BASOPHILS ABSOLUTE: 0.04 E9/L (ref 0–0.2)
BASOPHILS RELATIVE PERCENT: 0.6 % (ref 0–2)
BILIRUB SERPL-MCNC: 0.9 MG/DL (ref 0–1.2)
BUN BLDV-MCNC: 64 MG/DL (ref 8–23)
CALCIUM SERPL-MCNC: 10.4 MG/DL (ref 8.6–10.2)
CHLORIDE BLD-SCNC: 99 MMOL/L (ref 98–107)
CHOLESTEROL, TOTAL: 139 MG/DL (ref 0–199)
CO2: 22 MMOL/L (ref 22–29)
CREAT SERPL-MCNC: 2 MG/DL (ref 0.5–1)
EOSINOPHILS ABSOLUTE: 0.13 E9/L (ref 0.05–0.5)
EOSINOPHILS RELATIVE PERCENT: 2.1 % (ref 0–6)
FOLATE: 15.2 NG/ML (ref 4.8–24.2)
GFR AFRICAN AMERICAN: 28
GFR NON-AFRICAN AMERICAN: 24 ML/MIN/1.73
GLUCOSE BLD-MCNC: 99 MG/DL (ref 74–109)
HCT VFR BLD CALC: 37.3 % (ref 34–48)
HDLC SERPL-MCNC: 65 MG/DL
HEMOGLOBIN: 12.4 G/DL (ref 11.5–15.5)
IMMATURE GRANULOCYTES #: 0.04 E9/L
IMMATURE GRANULOCYTES %: 0.6 % (ref 0–5)
LDL CHOLESTEROL CALCULATED: 56 MG/DL (ref 0–99)
LYMPHOCYTES ABSOLUTE: 1.09 E9/L (ref 1.5–4)
LYMPHOCYTES RELATIVE PERCENT: 17.5 % (ref 20–42)
MCH RBC QN AUTO: 30.2 PG (ref 26–35)
MCHC RBC AUTO-ENTMCNC: 33.2 % (ref 32–34.5)
MCV RBC AUTO: 90.8 FL (ref 80–99.9)
MONOCYTES ABSOLUTE: 0.59 E9/L (ref 0.1–0.95)
MONOCYTES RELATIVE PERCENT: 9.5 % (ref 2–12)
NEUTROPHILS ABSOLUTE: 4.33 E9/L (ref 1.8–7.3)
NEUTROPHILS RELATIVE PERCENT: 69.7 % (ref 43–80)
PDW BLD-RTO: 14.2 FL (ref 11.5–15)
PLATELET # BLD: 165 E9/L (ref 130–450)
PMV BLD AUTO: 10.6 FL (ref 7–12)
POTASSIUM SERPL-SCNC: 5.3 MMOL/L (ref 3.5–5)
RBC # BLD: 4.11 E12/L (ref 3.5–5.5)
SODIUM BLD-SCNC: 139 MMOL/L (ref 132–146)
TOTAL PROTEIN: 6.7 G/DL (ref 6.4–8.3)
TRIGL SERPL-MCNC: 89 MG/DL (ref 0–149)
TSH SERPL DL<=0.05 MIU/L-ACNC: 0.66 UIU/ML (ref 0.27–4.2)
VITAMIN B-12: 634 PG/ML (ref 211–946)
VLDLC SERPL CALC-MCNC: 18 MG/DL
WBC # BLD: 6.2 E9/L (ref 4.5–11.5)

## 2018-09-12 PROCEDURE — 82607 VITAMIN B-12: CPT

## 2018-09-12 PROCEDURE — 80061 LIPID PANEL: CPT

## 2018-09-12 PROCEDURE — 36415 COLL VENOUS BLD VENIPUNCTURE: CPT

## 2018-09-12 PROCEDURE — 84443 ASSAY THYROID STIM HORMONE: CPT

## 2018-09-12 PROCEDURE — 80053 COMPREHEN METABOLIC PANEL: CPT

## 2018-09-12 PROCEDURE — 85025 COMPLETE CBC W/AUTO DIFF WBC: CPT

## 2018-09-12 PROCEDURE — 82746 ASSAY OF FOLIC ACID SERUM: CPT

## 2018-09-25 ENCOUNTER — OFFICE VISIT (OUTPATIENT)
Dept: INTERNAL MEDICINE CLINIC | Age: 83
End: 2018-09-25
Payer: MEDICARE

## 2018-09-25 VITALS
BODY MASS INDEX: 22.67 KG/M2 | SYSTOLIC BLOOD PRESSURE: 86 MMHG | WEIGHT: 108 LBS | DIASTOLIC BLOOD PRESSURE: 50 MMHG | TEMPERATURE: 97.6 F | HEIGHT: 58 IN

## 2018-09-25 DIAGNOSIS — Z13.220 SCREENING FOR HYPERLIPIDEMIA: ICD-10-CM

## 2018-09-25 DIAGNOSIS — M19.90 ARTHRITIS: Primary | ICD-10-CM

## 2018-09-25 DIAGNOSIS — Z13.1 SCREENING FOR DIABETES MELLITUS (DM): ICD-10-CM

## 2018-09-25 DIAGNOSIS — E83.52 HYPERCALCEMIA: ICD-10-CM

## 2018-09-25 DIAGNOSIS — N18.4 KIDNEY DISEASE, CHRONIC, STAGE IV (SEVERE, EGFR 15-29 ML/MIN) (HCC): Chronic | ICD-10-CM

## 2018-09-25 DIAGNOSIS — Z13.6 SCREENING FOR CARDIOVASCULAR CONDITION: ICD-10-CM

## 2018-09-25 DIAGNOSIS — I50.22 CHRONIC SYSTOLIC CONGESTIVE HEART FAILURE (HCC): Chronic | ICD-10-CM

## 2018-09-25 DIAGNOSIS — Z00.00 ROUTINE GENERAL MEDICAL EXAMINATION AT A HEALTH CARE FACILITY: ICD-10-CM

## 2018-09-25 DIAGNOSIS — I50.21 ACUTE SYSTOLIC CONGESTIVE HEART FAILURE (HCC): ICD-10-CM

## 2018-09-25 PROCEDURE — 90471 IMMUNIZATION ADMIN: CPT | Performed by: INTERNAL MEDICINE

## 2018-09-25 PROCEDURE — G8598 ASA/ANTIPLAT THER USED: HCPCS | Performed by: INTERNAL MEDICINE

## 2018-09-25 PROCEDURE — 99213 OFFICE O/P EST LOW 20 MIN: CPT | Performed by: INTERNAL MEDICINE

## 2018-09-25 PROCEDURE — G0008 ADMIN INFLUENZA VIRUS VAC: HCPCS | Performed by: INTERNAL MEDICINE

## 2018-09-25 PROCEDURE — 4040F PNEUMOC VAC/ADMIN/RCVD: CPT | Performed by: INTERNAL MEDICINE

## 2018-09-25 PROCEDURE — G0438 PPPS, INITIAL VISIT: HCPCS | Performed by: INTERNAL MEDICINE

## 2018-09-25 RX ORDER — TRAMADOL HYDROCHLORIDE 50 MG/1
50 TABLET ORAL EVERY 8 HOURS PRN
Qty: 90 TABLET | Refills: 0 | Status: SHIPPED | OUTPATIENT
Start: 2018-09-25 | End: 2018-10-23 | Stop reason: SDUPTHER

## 2018-09-25 RX ORDER — SPIRONOLACTONE 25 MG/1
25 TABLET ORAL DAILY
Qty: 30 TABLET | Refills: 2 | Status: SHIPPED | OUTPATIENT
Start: 2018-09-25 | End: 2018-10-02 | Stop reason: SDUPTHER

## 2018-09-25 RX ORDER — TRAMADOL HYDROCHLORIDE 50 MG/1
50 TABLET ORAL 2 TIMES DAILY
COMMUNITY
End: 2018-09-25 | Stop reason: SDUPTHER

## 2018-09-25 ASSESSMENT — PATIENT HEALTH QUESTIONNAIRE - PHQ9
SUM OF ALL RESPONSES TO PHQ9 QUESTIONS 1 & 2: 2
SUM OF ALL RESPONSES TO PHQ QUESTIONS 1-9: 2
SUM OF ALL RESPONSES TO PHQ QUESTIONS 1-9: 2
1. LITTLE INTEREST OR PLEASURE IN DOING THINGS: 1
2. FEELING DOWN, DEPRESSED OR HOPELESS: 1

## 2018-09-25 ASSESSMENT — ANXIETY QUESTIONNAIRES: GAD7 TOTAL SCORE: 2

## 2018-09-25 NOTE — PROGRESS NOTES
mucosa and turbinates normal without polyps  Neck: supple and non-tender without mass, no thyromegaly or thyroid nodules, no cervical lymphadenopathy  Pulmonary/Chest: clear to auscultation bilaterally- no wheezes, rales or rhonchi, normal air movement, no respiratory distress  Cardiovascular: normal rate, regular rhythm, normal S1 and S2, no murmurs, rubs, clicks, or gallops, distal pulses intact, no carotid bruits  Abdomen: soft, non-tender, non-distended, normal bowel sounds, no masses or organomegaly  Extremities: no cyanosis, clubbing or edema  Musculoskeletal: normal range of motion, no joint swelling, deformity or tenderness  Neurologic: reflexes normal and symmetric, no cranial nerve deficit, gait, coordination and speech normal    Patient's complete Health Risk Assessment and screening values have been reviewed and are found in Flowsheets. The following problems were reviewed today and where indicated follow up appointments were made and/or referrals ordered. Positive Risk Factor Screenings with Interventions:     Health Habits/Nutrition:  Health Habits/Nutrition  Do you exercise for at least 20 minutes 2-3 times per week?: (!) No  Have you lost any weight without trying in the past 3 months?: No  Do you eat fewer than 2 meals per day?: No  Have you seen a dentist within the past year?: (!) No  Body mass index is 22.57 kg/m². Health Habits/Nutrition Interventions:  · Dental exam overdue:  patient encouraged to make appointment with his/her dentist    Hearing/Vision:  Hearing/Vision  Do you or your family notice any trouble with your hearing?: (!) Yes  Do you have difficulty driving, watching TV, or doing any of your daily activities because of your eyesight?: No  Have you had an eye exam within the past year?: (!) No  Hearing/Vision Interventions:  Follow up with ophthamologist  ·     Safety:  Safety  Do you have working smoke detectors?: Yes  Have all throw rugs been removed or fastened?: (!) No  Do you 140/90 mmHg  · Maintain blood total cholesterol levels under 5 mmol/l or 190 mg/dl  · Maintain LDL cholesterol levels under 3.0 mmol/l or 115 mg/dl   · Control blood glucose levels  · Consider taking aspirin (75 mg daily), once blood pressure is controlled   Provided a follow up plan.   Time spent (minutes): 25

## 2018-09-25 NOTE — PROGRESS NOTES
Vaccine Information Sheet, \"Influenza - Inactivated\"  given to Paulino Mojica, or parent/legal guardian of  Paulino Mojica and verbalized understanding. Patient responses:    Have you ever had a reaction to a flu vaccine? No  Are you able to eat eggs without adverse effects? Yes  Do you have any current illness? No  Have you ever had [place] Syndrome? No    Flu vaccine given per order. Please see immunization tab.

## 2018-09-25 NOTE — PATIENT INSTRUCTIONS
Personalized Preventive Plan for Cole Villafana - 9/25/2018  Medicare offers a range of preventive health benefits. Some of the tests and screenings are paid in full while other may be subject to a deductible, co-insurance, and/or copay. Some of these benefits include a comprehensive review of your medical history including lifestyle, illnesses that may run in your family, and various assessments and screenings as appropriate. After reviewing your medical record and screening and assessments performed today your provider may have ordered immunizations, labs, imaging, and/or referrals for you. A list of these orders (if applicable) as well as your Preventive Care list are included within your After Visit Summary for your review. Other Preventive Recommendations:    · A preventive eye exam performed by an eye specialist is recommended every 1-2 years to screen for glaucoma; cataracts, macular degeneration, and other eye disorders. · A preventive dental visit is recommended every 6 months. · Try to get at least 150 minutes of exercise per week or 10,000 steps per day on a pedometer . · Order or download the FREE \"Exercise & Physical Activity: Your Everyday Guide\" from [employer]. Call 9-929.219.5304 or search [employer] online. · You need 2796-6019 mg of calcium and 7325-2705 IU of vitamin D per day. It is possible to meet your calcium requirement with diet alone, but a vitamin D supplement is usually necessary to meet this goal.  · When exposed to the sun, use a sunscreen that protects against both UVA and UVB radiation with an SPF of 30 or greater. Reapply every 2 to 3 hours or after sweating, drying off with a towel, or swimming. · Always wear a seat belt when traveling in a car. Always wear a helmet when riding a bicycle or motorcycle.

## 2018-10-02 DIAGNOSIS — I50.22 CHRONIC SYSTOLIC CONGESTIVE HEART FAILURE (HCC): Chronic | ICD-10-CM

## 2018-10-02 DIAGNOSIS — I10 ESSENTIAL HYPERTENSION: Chronic | ICD-10-CM

## 2018-10-02 DIAGNOSIS — I50.21 ACUTE SYSTOLIC CONGESTIVE HEART FAILURE (HCC): ICD-10-CM

## 2018-10-02 DIAGNOSIS — E78.5 HYPERLIPIDEMIA, UNSPECIFIED HYPERLIPIDEMIA TYPE: Chronic | ICD-10-CM

## 2018-10-03 RX ORDER — SIMVASTATIN 40 MG
TABLET ORAL
Qty: 90 TABLET | Refills: 3 | Status: SHIPPED | OUTPATIENT
Start: 2018-10-03 | End: 2019-10-28 | Stop reason: SDUPTHER

## 2018-10-03 RX ORDER — SPIRONOLACTONE 25 MG/1
25 TABLET ORAL DAILY
Qty: 90 TABLET | Refills: 2 | Status: SHIPPED | OUTPATIENT
Start: 2018-10-03 | End: 2019-07-30 | Stop reason: SDUPTHER

## 2018-10-03 RX ORDER — CARVEDILOL 3.12 MG/1
TABLET ORAL
Qty: 180 TABLET | Refills: 3 | Status: SHIPPED | OUTPATIENT
Start: 2018-10-03 | End: 2019-10-28 | Stop reason: SDUPTHER

## 2018-10-23 ENCOUNTER — OFFICE VISIT (OUTPATIENT)
Dept: INTERNAL MEDICINE CLINIC | Age: 83
End: 2018-10-23
Payer: MEDICARE

## 2018-10-23 VITALS
DIASTOLIC BLOOD PRESSURE: 48 MMHG | WEIGHT: 108 LBS | OXYGEN SATURATION: 96 % | TEMPERATURE: 97.5 F | HEART RATE: 98 BPM | SYSTOLIC BLOOD PRESSURE: 94 MMHG | BODY MASS INDEX: 24.99 KG/M2 | HEIGHT: 55 IN

## 2018-10-23 DIAGNOSIS — N18.4 KIDNEY DISEASE, CHRONIC, STAGE IV (SEVERE, EGFR 15-29 ML/MIN) (HCC): Primary | Chronic | ICD-10-CM

## 2018-10-23 DIAGNOSIS — M19.90 ARTHRITIS: ICD-10-CM

## 2018-10-23 DIAGNOSIS — I48.21 PERMANENT ATRIAL FIBRILLATION (HCC): Chronic | ICD-10-CM

## 2018-10-23 DIAGNOSIS — I50.41 ACUTE COMBINED SYSTOLIC AND DIASTOLIC CONGESTIVE HEART FAILURE (HCC): ICD-10-CM

## 2018-10-23 PROCEDURE — 1036F TOBACCO NON-USER: CPT | Performed by: INTERNAL MEDICINE

## 2018-10-23 PROCEDURE — G8417 CALC BMI ABV UP PARAM F/U: HCPCS | Performed by: INTERNAL MEDICINE

## 2018-10-23 PROCEDURE — G8427 DOCREV CUR MEDS BY ELIG CLIN: HCPCS | Performed by: INTERNAL MEDICINE

## 2018-10-23 PROCEDURE — G8482 FLU IMMUNIZE ORDER/ADMIN: HCPCS | Performed by: INTERNAL MEDICINE

## 2018-10-23 PROCEDURE — 1101F PT FALLS ASSESS-DOCD LE1/YR: CPT | Performed by: INTERNAL MEDICINE

## 2018-10-23 PROCEDURE — 1090F PRES/ABSN URINE INCON ASSESS: CPT | Performed by: INTERNAL MEDICINE

## 2018-10-23 PROCEDURE — 99213 OFFICE O/P EST LOW 20 MIN: CPT | Performed by: INTERNAL MEDICINE

## 2018-10-23 PROCEDURE — G8598 ASA/ANTIPLAT THER USED: HCPCS | Performed by: INTERNAL MEDICINE

## 2018-10-23 PROCEDURE — 4040F PNEUMOC VAC/ADMIN/RCVD: CPT | Performed by: INTERNAL MEDICINE

## 2018-10-23 PROCEDURE — 1123F ACP DISCUSS/DSCN MKR DOCD: CPT | Performed by: INTERNAL MEDICINE

## 2018-10-23 RX ORDER — TRAMADOL HYDROCHLORIDE 50 MG/1
50 TABLET ORAL EVERY 8 HOURS PRN
Qty: 90 TABLET | Refills: 0 | Status: SHIPPED | OUTPATIENT
Start: 2018-10-23 | End: 2018-12-04 | Stop reason: SDUPTHER

## 2018-10-25 ENCOUNTER — HOSPITAL ENCOUNTER (OUTPATIENT)
Age: 83
Discharge: HOME | End: 2018-10-25
Payer: MEDICARE

## 2018-10-25 DIAGNOSIS — N18.4 KIDNEY DISEASE, CHRONIC, STAGE IV (SEVERE, EGFR 15-29 ML/MIN) (HCC): Chronic | ICD-10-CM

## 2018-10-25 LAB
ALBUMIN SERPL-MCNC: 3.9 G/DL (ref 3.5–5.2)
ALP BLD-CCNC: 78 U/L (ref 35–104)
ALT SERPL-CCNC: 8 U/L (ref 0–32)
ANION GAP SERPL CALCULATED.3IONS-SCNC: 16 MMOL/L (ref 7–16)
AST SERPL-CCNC: 26 U/L (ref 0–31)
BILIRUB SERPL-MCNC: 0.8 MG/DL (ref 0–1.2)
BUN BLDV-MCNC: 53 MG/DL (ref 8–23)
CALCIUM IONIZED: 1.3 MMOL/L (ref 1.15–1.33)
CALCIUM SERPL-MCNC: 9.5 MG/DL (ref 8.6–10.2)
CHLORIDE BLD-SCNC: 100 MMOL/L (ref 98–107)
CO2: 24 MMOL/L (ref 22–29)
CREAT SERPL-MCNC: 1.8 MG/DL (ref 0.5–1)
GFR AFRICAN AMERICAN: 32
GFR NON-AFRICAN AMERICAN: 27 ML/MIN/1.73
GLUCOSE BLD-MCNC: 138 MG/DL (ref 74–109)
PARATHYROID HORMONE INTACT: 81 PG/ML (ref 15–65)
POTASSIUM SERPL-SCNC: 4.5 MMOL/L (ref 3.5–5)
SODIUM BLD-SCNC: 140 MMOL/L (ref 132–146)
TOTAL PROTEIN: 6.1 G/DL (ref 6.4–8.3)

## 2018-10-25 PROCEDURE — 80053 COMPREHEN METABOLIC PANEL: CPT

## 2018-10-25 PROCEDURE — 82330 ASSAY OF CALCIUM: CPT

## 2018-10-25 PROCEDURE — 36415 COLL VENOUS BLD VENIPUNCTURE: CPT

## 2018-10-25 PROCEDURE — 83970 ASSAY OF PARATHORMONE: CPT

## 2018-11-21 DIAGNOSIS — I50.21 ACUTE SYSTOLIC CONGESTIVE HEART FAILURE (HCC): ICD-10-CM

## 2018-12-04 ENCOUNTER — OFFICE VISIT (OUTPATIENT)
Dept: INTERNAL MEDICINE CLINIC | Age: 83
End: 2018-12-04
Payer: MEDICARE

## 2018-12-04 VITALS
OXYGEN SATURATION: 96 % | SYSTOLIC BLOOD PRESSURE: 94 MMHG | DIASTOLIC BLOOD PRESSURE: 48 MMHG | HEIGHT: 55 IN | HEART RATE: 82 BPM | BODY MASS INDEX: 25.09 KG/M2 | WEIGHT: 108.4 LBS | TEMPERATURE: 97.4 F

## 2018-12-04 DIAGNOSIS — E78.5 HYPERLIPIDEMIA, UNSPECIFIED HYPERLIPIDEMIA TYPE: Chronic | ICD-10-CM

## 2018-12-04 DIAGNOSIS — I50.43 CHF (CONGESTIVE HEART FAILURE), NYHA CLASS I, ACUTE ON CHRONIC, COMBINED (HCC): ICD-10-CM

## 2018-12-04 DIAGNOSIS — I48.21 PERMANENT ATRIAL FIBRILLATION (HCC): Chronic | ICD-10-CM

## 2018-12-04 DIAGNOSIS — N18.4 KIDNEY DISEASE, CHRONIC, STAGE IV (SEVERE, EGFR 15-29 ML/MIN) (HCC): Primary | Chronic | ICD-10-CM

## 2018-12-04 DIAGNOSIS — M19.90 ARTHRITIS: ICD-10-CM

## 2018-12-04 DIAGNOSIS — I34.0 MITRAL VALVE INSUFFICIENCY, UNSPECIFIED ETIOLOGY: Chronic | ICD-10-CM

## 2018-12-04 PROCEDURE — 1123F ACP DISCUSS/DSCN MKR DOCD: CPT | Performed by: INTERNAL MEDICINE

## 2018-12-04 PROCEDURE — G8482 FLU IMMUNIZE ORDER/ADMIN: HCPCS | Performed by: INTERNAL MEDICINE

## 2018-12-04 PROCEDURE — G8417 CALC BMI ABV UP PARAM F/U: HCPCS | Performed by: INTERNAL MEDICINE

## 2018-12-04 PROCEDURE — 99213 OFFICE O/P EST LOW 20 MIN: CPT | Performed by: INTERNAL MEDICINE

## 2018-12-04 PROCEDURE — 4040F PNEUMOC VAC/ADMIN/RCVD: CPT | Performed by: INTERNAL MEDICINE

## 2018-12-04 PROCEDURE — G8427 DOCREV CUR MEDS BY ELIG CLIN: HCPCS | Performed by: INTERNAL MEDICINE

## 2018-12-04 PROCEDURE — 1090F PRES/ABSN URINE INCON ASSESS: CPT | Performed by: INTERNAL MEDICINE

## 2018-12-04 PROCEDURE — 1101F PT FALLS ASSESS-DOCD LE1/YR: CPT | Performed by: INTERNAL MEDICINE

## 2018-12-04 PROCEDURE — G8598 ASA/ANTIPLAT THER USED: HCPCS | Performed by: INTERNAL MEDICINE

## 2018-12-04 PROCEDURE — 1036F TOBACCO NON-USER: CPT | Performed by: INTERNAL MEDICINE

## 2018-12-04 RX ORDER — TRAMADOL HYDROCHLORIDE 50 MG/1
50 TABLET ORAL EVERY 8 HOURS PRN
Qty: 90 TABLET | Refills: 0 | Status: SHIPPED | OUTPATIENT
Start: 2018-12-04 | End: 2019-01-03

## 2018-12-04 NOTE — PROGRESS NOTES
clear  Neck:  no adenopathy, bruit, JVD, tenderness, masses, or nodules; supple, symmetrical, trachea midline, thyroid not enlarged  Lung:  clear to auscultation bilaterally; no use of accessory muscles; no rhonchi, rales, or crackles  Heart:  Irregularly irregular without rub, or gallop + systolic murmur  Abdomen:  soft, nontender, nondistended; normoactive bowel sounds; no organomegaly  Extremities:no edema Venous stasis  Musculokeletal:  no joint swelling, no muscle tenderness. ROM normal in all joints of extremities. Neurologic:  mental status A&Ox3, thought content appropriate; CN II-XII grossly intact; sensation intact, motor strength 5/5 globally; no slurred speech  Osteopathic:  Patient examined in seated position; normal lumbar lordosis and thoracic kyphosis; no TART changes to paraspinal musculature    Laboratory Data -   I have reviewed all the lab results. There are some abnormalities which were discussed with the patient and daughter. Assessment and Plan  Patient is a 80 y.o. female who presented for routine follow-up. The active problem list is as follows:      Past Medical History:   Diagnosis Date    Anxiety     Arthritis     [facility] [address]     CAD (coronary artery disease)     Chronic kidney disease     STAGE 4    [facility]F) ([facility])     History of cardiovascular stress test 03/28/2018    Lexiscan stress test    History of echocardiogram 09/30/2016    EF 40-45%    Hyperlipidemia     Hypertension     Influenza B     Microcytic anemia     Neuropathy      10.23.18  Judson Hernández was seen today for 1 month follow-up and health maintenance. Diagnoses and all orders for this visit:    Kidney disease, chronic, stage IV (severe, EGFR 15-29 ml/min) ([facility])    Arthritis  -     traMADol (ULTRAM) 50 MG tablet; Take 1 tablet by mouth every 8 hours as needed for Pain for up to 30 days. .    Permanent atrial fibrillation ([facility])    CHF (congestive heart failure), NYHA class I, acute on chronic, combined ([facility] [address]  -     CBC WITH AUTO DIFFERENTIAL; Future  -     COMPREHENSIVE METABOLIC PANEL; Future    Hyperlipidemia, unspecified hyperlipidemia type    Mitral valve insufficiency, unspecified etiology    She has no acute complaints. She will follow up with Dr. Mahsa Rich for her scheduled appointment next week. Greater than 50% of the time during this encounter was spent face-to-face with the patient, counseling them regarding their medical conditions as well as reviewing all pertinent laboratory findings and imaging studies. The patient [facility].     Janene Cage DO PGYII  12/4/2018  1:06 PM

## 2019-01-08 ENCOUNTER — HOSPITAL ENCOUNTER (OUTPATIENT)
Age: 84
Discharge: HOME | End: 2019-01-08
Payer: MEDICARE

## 2019-01-08 DIAGNOSIS — I50.43 CHF (CONGESTIVE HEART FAILURE), NYHA CLASS I, ACUTE ON CHRONIC, COMBINED (HCC): ICD-10-CM

## 2019-01-08 LAB
ALBUMIN SERPL-MCNC: 3.9 G/DL (ref 3.5–5.2)
ALP BLD-CCNC: 97 U/L (ref 35–104)
ALT SERPL-CCNC: 9 U/L (ref 0–32)
ANION GAP SERPL CALCULATED.3IONS-SCNC: 15 MMOL/L (ref 7–16)
AST SERPL-CCNC: 21 U/L (ref 0–31)
BASOPHILS ABSOLUTE: 0.03 E9/L (ref 0–0.2)
BASOPHILS RELATIVE PERCENT: 0.6 % (ref 0–2)
BILIRUB SERPL-MCNC: 0.9 MG/DL (ref 0–1.2)
BUN BLDV-MCNC: 52 MG/DL (ref 8–23)
CALCIUM SERPL-MCNC: 9.7 MG/DL (ref 8.6–10.2)
CHLORIDE BLD-SCNC: 100 MMOL/L (ref 98–107)
CO2: 23 MMOL/L (ref 22–29)
CREAT SERPL-MCNC: 2.1 MG/DL (ref 0.5–1)
EOSINOPHILS ABSOLUTE: 0.1 E9/L (ref 0.05–0.5)
EOSINOPHILS RELATIVE PERCENT: 1.9 % (ref 0–6)
GFR AFRICAN AMERICAN: 27
GFR NON-AFRICAN AMERICAN: 22 ML/MIN/1.73
GLUCOSE BLD-MCNC: 70 MG/DL (ref 74–99)
HCT VFR BLD CALC: 37.6 % (ref 34–48)
HEMOGLOBIN: 12.3 G/DL (ref 11.5–15.5)
IMMATURE GRANULOCYTES #: 0.02 E9/L
IMMATURE GRANULOCYTES %: 0.4 % (ref 0–5)
LYMPHOCYTES ABSOLUTE: 0.7 E9/L (ref 1.5–4)
LYMPHOCYTES RELATIVE PERCENT: 13.3 % (ref 20–42)
MCH RBC QN AUTO: 29.6 PG (ref 26–35)
MCHC RBC AUTO-ENTMCNC: 32.7 % (ref 32–34.5)
MCV RBC AUTO: 90.6 FL (ref 80–99.9)
MONOCYTES ABSOLUTE: 0.63 E9/L (ref 0.1–0.95)
MONOCYTES RELATIVE PERCENT: 12 % (ref 2–12)
NEUTROPHILS ABSOLUTE: 3.77 E9/L (ref 1.8–7.3)
NEUTROPHILS RELATIVE PERCENT: 71.8 % (ref 43–80)
PDW BLD-RTO: 13.4 FL (ref 11.5–15)
PLATELET # BLD: 154 E9/L (ref 130–450)
PMV BLD AUTO: 10.8 FL (ref 7–12)
POTASSIUM SERPL-SCNC: 4.9 MMOL/L (ref 3.5–5)
RBC # BLD: 4.15 E12/L (ref 3.5–5.5)
SODIUM BLD-SCNC: 138 MMOL/L (ref 132–146)
TOTAL PROTEIN: 6.5 G/DL (ref 6.4–8.3)
WBC # BLD: 5.3 E9/L (ref 4.5–11.5)

## 2019-01-08 PROCEDURE — 85025 COMPLETE CBC W/AUTO DIFF WBC: CPT

## 2019-01-08 PROCEDURE — 80053 COMPREHEN METABOLIC PANEL: CPT

## 2019-01-08 PROCEDURE — 36415 COLL VENOUS BLD VENIPUNCTURE: CPT

## 2019-01-15 ENCOUNTER — OFFICE VISIT (OUTPATIENT)
Dept: INTERNAL MEDICINE CLINIC | Age: 84
End: 2019-01-15
Payer: MEDICARE

## 2019-01-15 VITALS
TEMPERATURE: 97.4 F | DIASTOLIC BLOOD PRESSURE: 58 MMHG | WEIGHT: 109 LBS | HEIGHT: 55 IN | SYSTOLIC BLOOD PRESSURE: 84 MMHG | BODY MASS INDEX: 25.22 KG/M2

## 2019-01-15 DIAGNOSIS — I48.21 PERMANENT ATRIAL FIBRILLATION (HCC): Chronic | ICD-10-CM

## 2019-01-15 DIAGNOSIS — M19.90 ARTHRITIS: Chronic | ICD-10-CM

## 2019-01-15 DIAGNOSIS — N18.4 KIDNEY DISEASE, CHRONIC, STAGE IV (SEVERE, EGFR 15-29 ML/MIN) (HCC): Primary | Chronic | ICD-10-CM

## 2019-01-15 DIAGNOSIS — N18.30 CHRONIC KIDNEY DISEASE, STAGE III (MODERATE) (HCC): ICD-10-CM

## 2019-01-15 DIAGNOSIS — I50.42 CHRONIC COMBINED SYSTOLIC AND DIASTOLIC CONGESTIVE HEART FAILURE (HCC): Chronic | ICD-10-CM

## 2019-01-15 PROCEDURE — 1090F PRES/ABSN URINE INCON ASSESS: CPT | Performed by: INTERNAL MEDICINE

## 2019-01-15 PROCEDURE — 1036F TOBACCO NON-USER: CPT | Performed by: INTERNAL MEDICINE

## 2019-01-15 PROCEDURE — G8417 CALC BMI ABV UP PARAM F/U: HCPCS | Performed by: INTERNAL MEDICINE

## 2019-01-15 PROCEDURE — 1101F PT FALLS ASSESS-DOCD LE1/YR: CPT | Performed by: INTERNAL MEDICINE

## 2019-01-15 PROCEDURE — G8482 FLU IMMUNIZE ORDER/ADMIN: HCPCS | Performed by: INTERNAL MEDICINE

## 2019-01-15 PROCEDURE — 4040F PNEUMOC VAC/ADMIN/RCVD: CPT | Performed by: INTERNAL MEDICINE

## 2019-01-15 PROCEDURE — 1123F ACP DISCUSS/DSCN MKR DOCD: CPT | Performed by: INTERNAL MEDICINE

## 2019-01-15 PROCEDURE — 99213 OFFICE O/P EST LOW 20 MIN: CPT | Performed by: INTERNAL MEDICINE

## 2019-01-15 PROCEDURE — G8427 DOCREV CUR MEDS BY ELIG CLIN: HCPCS | Performed by: INTERNAL MEDICINE

## 2019-01-15 RX ORDER — TRAMADOL HYDROCHLORIDE 50 MG/1
50 TABLET ORAL EVERY 6 HOURS PRN
COMMUNITY
End: 2019-01-15 | Stop reason: SDUPTHER

## 2019-01-15 RX ORDER — TRAMADOL HYDROCHLORIDE 50 MG/1
50 TABLET ORAL EVERY 6 HOURS PRN
Qty: 90 TABLET | Refills: 0 | Status: SHIPPED | OUTPATIENT
Start: 2019-01-15 | End: 2019-02-19 | Stop reason: SDUPTHER

## 2019-02-13 PROBLEM — N18.9 ACUTE KIDNEY INJURY SUPERIMPOSED ON CHRONIC KIDNEY DISEASE (HCC): Status: RESOLVED | Noted: 2018-06-05 | Resolved: 2019-02-13

## 2019-02-13 PROBLEM — R79.89 ELEVATED LIVER FUNCTION TESTS: Status: RESOLVED | Noted: 2018-03-24 | Resolved: 2019-02-13

## 2019-02-13 PROBLEM — N17.9 ACUTE KIDNEY INJURY SUPERIMPOSED ON CHRONIC KIDNEY DISEASE (HCC): Status: RESOLVED | Noted: 2018-06-05 | Resolved: 2019-02-13

## 2019-02-13 PROBLEM — I50.43 CHF (CONGESTIVE HEART FAILURE), NYHA CLASS I, ACUTE ON CHRONIC, COMBINED (HCC): Status: RESOLVED | Noted: 2018-03-25 | Resolved: 2019-02-13

## 2019-02-13 PROBLEM — I50.41 ACUTE COMBINED SYSTOLIC AND DIASTOLIC CONGESTIVE HEART FAILURE (HCC): Status: RESOLVED | Noted: 2018-03-23 | Resolved: 2019-02-13

## 2019-02-19 ENCOUNTER — OFFICE VISIT (OUTPATIENT)
Dept: INTERNAL MEDICINE CLINIC | Age: 84
End: 2019-02-19
Payer: MEDICARE

## 2019-02-19 VITALS
SYSTOLIC BLOOD PRESSURE: 113 MMHG | WEIGHT: 109.2 LBS | TEMPERATURE: 97.7 F | HEIGHT: 55 IN | BODY MASS INDEX: 25.27 KG/M2 | DIASTOLIC BLOOD PRESSURE: 60 MMHG | HEART RATE: 54 BPM

## 2019-02-19 DIAGNOSIS — M19.90 ARTHRITIS: Chronic | ICD-10-CM

## 2019-02-19 DIAGNOSIS — I50.42 CHRONIC COMBINED SYSTOLIC AND DIASTOLIC CONGESTIVE HEART FAILURE (HCC): Chronic | ICD-10-CM

## 2019-02-19 DIAGNOSIS — N18.4 KIDNEY DISEASE, CHRONIC, STAGE IV (SEVERE, EGFR 15-29 ML/MIN) (HCC): Chronic | ICD-10-CM

## 2019-02-19 DIAGNOSIS — I48.21 PERMANENT ATRIAL FIBRILLATION (HCC): Primary | Chronic | ICD-10-CM

## 2019-02-19 PROCEDURE — 1090F PRES/ABSN URINE INCON ASSESS: CPT | Performed by: INTERNAL MEDICINE

## 2019-02-19 PROCEDURE — 4040F PNEUMOC VAC/ADMIN/RCVD: CPT | Performed by: INTERNAL MEDICINE

## 2019-02-19 PROCEDURE — G8482 FLU IMMUNIZE ORDER/ADMIN: HCPCS | Performed by: INTERNAL MEDICINE

## 2019-02-19 PROCEDURE — 1101F PT FALLS ASSESS-DOCD LE1/YR: CPT | Performed by: INTERNAL MEDICINE

## 2019-02-19 PROCEDURE — G8427 DOCREV CUR MEDS BY ELIG CLIN: HCPCS | Performed by: INTERNAL MEDICINE

## 2019-02-19 PROCEDURE — 99213 OFFICE O/P EST LOW 20 MIN: CPT | Performed by: INTERNAL MEDICINE

## 2019-02-19 PROCEDURE — 1123F ACP DISCUSS/DSCN MKR DOCD: CPT | Performed by: INTERNAL MEDICINE

## 2019-02-19 PROCEDURE — 1036F TOBACCO NON-USER: CPT | Performed by: INTERNAL MEDICINE

## 2019-02-19 PROCEDURE — G8417 CALC BMI ABV UP PARAM F/U: HCPCS | Performed by: INTERNAL MEDICINE

## 2019-02-19 PROCEDURE — 90732 PPSV23 VACC 2 YRS+ SUBQ/IM: CPT | Performed by: INTERNAL MEDICINE

## 2019-02-19 RX ORDER — TRAMADOL HYDROCHLORIDE 50 MG/1
50 TABLET ORAL EVERY 8 HOURS PRN
Qty: 90 TABLET | Refills: 0 | Status: SHIPPED | OUTPATIENT
Start: 2019-02-19 | End: 2019-03-21

## 2019-02-19 ASSESSMENT — PATIENT HEALTH QUESTIONNAIRE - PHQ9
SUM OF ALL RESPONSES TO PHQ QUESTIONS 1-9: 0
SUM OF ALL RESPONSES TO PHQ QUESTIONS 1-9: 0
1. LITTLE INTEREST OR PLEASURE IN DOING THINGS: 0
2. FEELING DOWN, DEPRESSED OR HOPELESS: 0
SUM OF ALL RESPONSES TO PHQ9 QUESTIONS 1 & 2: 0

## 2019-03-26 ENCOUNTER — OFFICE VISIT (OUTPATIENT)
Dept: INTERNAL MEDICINE CLINIC | Age: 84
End: 2019-03-26
Payer: MEDICARE

## 2019-03-26 VITALS
HEART RATE: 53 BPM | BODY MASS INDEX: 25.73 KG/M2 | SYSTOLIC BLOOD PRESSURE: 94 MMHG | HEIGHT: 55 IN | WEIGHT: 111.2 LBS | DIASTOLIC BLOOD PRESSURE: 42 MMHG | TEMPERATURE: 97.7 F | OXYGEN SATURATION: 99 %

## 2019-03-26 DIAGNOSIS — I50.42 CHRONIC COMBINED SYSTOLIC AND DIASTOLIC CONGESTIVE HEART FAILURE (HCC): ICD-10-CM

## 2019-03-26 DIAGNOSIS — I48.21 PERMANENT ATRIAL FIBRILLATION (HCC): ICD-10-CM

## 2019-03-26 DIAGNOSIS — J01.10 ACUTE NON-RECURRENT FRONTAL SINUSITIS: ICD-10-CM

## 2019-03-26 DIAGNOSIS — M19.90 ARTHRITIS: ICD-10-CM

## 2019-03-26 DIAGNOSIS — N18.4 KIDNEY DISEASE, CHRONIC, STAGE IV (SEVERE, EGFR 15-29 ML/MIN) (HCC): Primary | ICD-10-CM

## 2019-03-26 PROCEDURE — 99213 OFFICE O/P EST LOW 20 MIN: CPT | Performed by: INTERNAL MEDICINE

## 2019-03-26 PROCEDURE — G8427 DOCREV CUR MEDS BY ELIG CLIN: HCPCS | Performed by: INTERNAL MEDICINE

## 2019-03-26 PROCEDURE — 1090F PRES/ABSN URINE INCON ASSESS: CPT | Performed by: INTERNAL MEDICINE

## 2019-03-26 PROCEDURE — G8417 CALC BMI ABV UP PARAM F/U: HCPCS | Performed by: INTERNAL MEDICINE

## 2019-03-26 PROCEDURE — 1101F PT FALLS ASSESS-DOCD LE1/YR: CPT | Performed by: INTERNAL MEDICINE

## 2019-03-26 PROCEDURE — G8482 FLU IMMUNIZE ORDER/ADMIN: HCPCS | Performed by: INTERNAL MEDICINE

## 2019-03-26 PROCEDURE — 1123F ACP DISCUSS/DSCN MKR DOCD: CPT | Performed by: INTERNAL MEDICINE

## 2019-03-26 PROCEDURE — 1036F TOBACCO NON-USER: CPT | Performed by: INTERNAL MEDICINE

## 2019-03-26 PROCEDURE — 4040F PNEUMOC VAC/ADMIN/RCVD: CPT | Performed by: INTERNAL MEDICINE

## 2019-03-26 RX ORDER — TRAMADOL HYDROCHLORIDE 50 MG/1
50 TABLET ORAL EVERY 8 HOURS PRN
Qty: 90 TABLET | Refills: 0 | Status: SHIPPED | OUTPATIENT
Start: 2019-03-26 | End: 2019-04-23 | Stop reason: SDUPTHER

## 2019-03-26 RX ORDER — FLUTICASONE PROPIONATE 50 MCG
2 SPRAY, SUSPENSION (ML) NASAL DAILY
Qty: 1 BOTTLE | Refills: 0 | Status: SHIPPED
Start: 2019-03-26 | End: 2020-10-07 | Stop reason: SDUPTHER

## 2019-03-26 RX ORDER — TRAMADOL HYDROCHLORIDE 50 MG/1
50 TABLET ORAL EVERY 8 HOURS PRN
COMMUNITY
End: 2019-03-26 | Stop reason: SDUPTHER

## 2019-04-17 ENCOUNTER — HOSPITAL ENCOUNTER (OUTPATIENT)
Age: 84
Discharge: HOME | End: 2019-04-17
Payer: MEDICARE

## 2019-04-17 DIAGNOSIS — I48.21 PERMANENT ATRIAL FIBRILLATION (HCC): Chronic | ICD-10-CM

## 2019-04-17 DIAGNOSIS — I50.42 CHRONIC COMBINED SYSTOLIC AND DIASTOLIC CONGESTIVE HEART FAILURE (HCC): Chronic | ICD-10-CM

## 2019-04-17 DIAGNOSIS — N18.4 KIDNEY DISEASE, CHRONIC, STAGE IV (SEVERE, EGFR 15-29 ML/MIN) (HCC): Chronic | ICD-10-CM

## 2019-04-17 LAB
ALBUMIN SERPL-MCNC: 4 G/DL (ref 3.5–5.2)
ALP BLD-CCNC: 109 U/L (ref 35–104)
ALT SERPL-CCNC: 5 U/L (ref 0–32)
ANION GAP SERPL CALCULATED.3IONS-SCNC: 14 MMOL/L (ref 7–16)
AST SERPL-CCNC: 21 U/L (ref 0–31)
BASOPHILS ABSOLUTE: 0.04 E9/L (ref 0–0.2)
BASOPHILS RELATIVE PERCENT: 0.6 % (ref 0–2)
BILIRUB SERPL-MCNC: 0.6 MG/DL (ref 0–1.2)
BUN BLDV-MCNC: 59 MG/DL (ref 8–23)
CALCIUM SERPL-MCNC: 9.2 MG/DL (ref 8.6–10.2)
CHLORIDE BLD-SCNC: 103 MMOL/L (ref 98–107)
CO2: 21 MMOL/L (ref 22–29)
CREAT SERPL-MCNC: 1.9 MG/DL (ref 0.5–1)
EOSINOPHILS ABSOLUTE: 0.23 E9/L (ref 0.05–0.5)
EOSINOPHILS RELATIVE PERCENT: 3.7 % (ref 0–6)
GFR AFRICAN AMERICAN: 30
GFR NON-AFRICAN AMERICAN: 25 ML/MIN/1.73
GLUCOSE BLD-MCNC: 70 MG/DL (ref 74–99)
HCT VFR BLD CALC: 35.5 % (ref 34–48)
HEMOGLOBIN: 11.6 G/DL (ref 11.5–15.5)
IMMATURE GRANULOCYTES #: 0.04 E9/L
IMMATURE GRANULOCYTES %: 0.6 % (ref 0–5)
LYMPHOCYTES ABSOLUTE: 0.89 E9/L (ref 1.5–4)
LYMPHOCYTES RELATIVE PERCENT: 14.3 % (ref 20–42)
MCH RBC QN AUTO: 29.9 PG (ref 26–35)
MCHC RBC AUTO-ENTMCNC: 32.7 % (ref 32–34.5)
MCV RBC AUTO: 91.5 FL (ref 80–99.9)
MONOCYTES ABSOLUTE: 0.47 E9/L (ref 0.1–0.95)
MONOCYTES RELATIVE PERCENT: 7.6 % (ref 2–12)
NEUTROPHILS ABSOLUTE: 4.55 E9/L (ref 1.8–7.3)
NEUTROPHILS RELATIVE PERCENT: 73.2 % (ref 43–80)
PDW BLD-RTO: 13.5 FL (ref 11.5–15)
PLATELET # BLD: 180 E9/L (ref 130–450)
PMV BLD AUTO: 11 FL (ref 7–12)
POTASSIUM SERPL-SCNC: 4.5 MMOL/L (ref 3.5–5)
RBC # BLD: 3.88 E12/L (ref 3.5–5.5)
SODIUM BLD-SCNC: 138 MMOL/L (ref 132–146)
TOTAL PROTEIN: 6.6 G/DL (ref 6.4–8.3)
WBC # BLD: 6.2 E9/L (ref 4.5–11.5)

## 2019-04-17 PROCEDURE — 85025 COMPLETE CBC W/AUTO DIFF WBC: CPT

## 2019-04-17 PROCEDURE — 36415 COLL VENOUS BLD VENIPUNCTURE: CPT

## 2019-04-17 PROCEDURE — 80053 COMPREHEN METABOLIC PANEL: CPT

## 2019-04-23 ENCOUNTER — OFFICE VISIT (OUTPATIENT)
Dept: INTERNAL MEDICINE CLINIC | Age: 84
End: 2019-04-23
Payer: MEDICARE

## 2019-04-23 ENCOUNTER — TELEPHONE (OUTPATIENT)
Dept: INTERNAL MEDICINE CLINIC | Age: 84
End: 2019-04-23

## 2019-04-23 VITALS
TEMPERATURE: 97.4 F | SYSTOLIC BLOOD PRESSURE: 84 MMHG | BODY MASS INDEX: 25.8 KG/M2 | WEIGHT: 111 LBS | DIASTOLIC BLOOD PRESSURE: 58 MMHG | HEART RATE: 60 BPM

## 2019-04-23 DIAGNOSIS — I50.42 CHRONIC COMBINED SYSTOLIC AND DIASTOLIC CONGESTIVE HEART FAILURE (HCC): Chronic | ICD-10-CM

## 2019-04-23 DIAGNOSIS — Z23 NEED FOR TETANUS, DIPHTHERIA, AND ACELLULAR PERTUSSIS (TDAP) VACCINE: ICD-10-CM

## 2019-04-23 DIAGNOSIS — N18.30 CHRONIC KIDNEY DISEASE, STAGE III (MODERATE) (HCC): ICD-10-CM

## 2019-04-23 DIAGNOSIS — M19.90 ARTHRITIS: ICD-10-CM

## 2019-04-23 DIAGNOSIS — I48.21 PERMANENT ATRIAL FIBRILLATION (HCC): Primary | ICD-10-CM

## 2019-04-23 PROCEDURE — G8427 DOCREV CUR MEDS BY ELIG CLIN: HCPCS | Performed by: INTERNAL MEDICINE

## 2019-04-23 PROCEDURE — 99213 OFFICE O/P EST LOW 20 MIN: CPT | Performed by: INTERNAL MEDICINE

## 2019-04-23 PROCEDURE — 1036F TOBACCO NON-USER: CPT | Performed by: INTERNAL MEDICINE

## 2019-04-23 PROCEDURE — G8417 CALC BMI ABV UP PARAM F/U: HCPCS | Performed by: INTERNAL MEDICINE

## 2019-04-23 PROCEDURE — 1090F PRES/ABSN URINE INCON ASSESS: CPT | Performed by: INTERNAL MEDICINE

## 2019-04-23 PROCEDURE — 4040F PNEUMOC VAC/ADMIN/RCVD: CPT | Performed by: INTERNAL MEDICINE

## 2019-04-23 PROCEDURE — 1123F ACP DISCUSS/DSCN MKR DOCD: CPT | Performed by: INTERNAL MEDICINE

## 2019-04-23 RX ORDER — TRAMADOL HYDROCHLORIDE 50 MG/1
50 TABLET ORAL EVERY 8 HOURS PRN
Qty: 90 TABLET | Refills: 0 | Status: SHIPPED | OUTPATIENT
Start: 2019-04-23 | End: 2019-05-23

## 2019-04-23 NOTE — PROGRESS NOTES
[facility]  -Resident Progress Note-     Zulma Allen / : 1931 / MRN [id number] / Kee Crews 2019    Reason for visit:    Chief Complaint   Patient presents with    1 Month Follow-Up    Medication Refill    Discuss Labs     Date of last visit:  19    Today's Visit  Zulma Allen is a 80 y.o. female who presents to [address] today for follow up for med refill. Marii Bellamy presents for pain medication refill. She looks well and has no leg edema. She has no acute complaints. Kidney function is stable.     Review of Systems  All bolded are positive; please see HPI  General:  Fever, chills, diaphoresis, fatigue, malaise, night sweats, weight loss  Psychological:  Anxiety, disorientation, hallucinations  ENT:  Epistaxis, headaches, vertigo, visual changes  Cardiovascular:  Chest pain, irregular heartbeats, palpitations, paroxysmal nocturnal dyspnea  Respiratory:  Shortness of breath, coughing, sputum production, hemoptysis, wheezing, orthopnea  Gastrointestinal:  Nausea, vomiting, diarrhea, heartburn, constipation, abdominal pain, hematemesis, hematochezia, melena, acholic stools  Genito-Urinary:  Dysuria, urgency, frequency, hematuria  Musculoskeletal:  Joint pain, joint stiffness, joint swelling, muscle pain  Neurology:  Headache, focal neurological deficits, weakness, numbness, paresthesia  Derm:  Rashes, ulcers, excoriations, bruising  Extremities:  Decreased ROM, peripheral edema, mottling    Physical Examination  Vitals:  BP (!) 84/58 (Site: Left Upper Arm, Position: Sitting, Cuff Size: Medium Adult)   Pulse 60   Temp 97.4 °F (36.3 °C) (Oral)   Wt 111 lb (50.3 kg)   BMI 25.80 kg/m²   General Appearance:  awake, alert, and oriented to person, place, time, and purpose; appears stated age and cooperative; no apparent distress no labored breathing  HEENT:  NCAT; PERRL; conjunctiva pink, sclera clear  Neck:  no adenopathy, bruit, JVD, tenderness, masses, or nodules; supple, symmetrical, trachea midline, thyroid not enlarged  Lung:  clear to auscultation bilaterally; no use of accessory muscles; no rhonchi, rales, or crackles  Heart:  Irregularly irregular without rub, or gallop + systolic murmur  Abdomen:  soft, nontender, nondistended; normoactive bowel sounds; no organomegaly  Extremities:no edema Venous stasis  Musculokeletal:  no joint swelling, no muscle tenderness. ROM normal in all joints of extremities. Neurologic:  mental status A&Ox3, thought content appropriate; CN II-XII grossly intact; sensation intact, motor strength 5/5 globally; no slurred speech  Osteopathic:  Patient examined in seated position; normal lumbar lordosis and thoracic kyphosis; no TART changes to paraspinal musculature    Laboratory Data -   I have reviewed all the lab results. There are some abnormalities which were discussed with the patient and daughter. Controlled Substances Monitoring:     RX Monitoring 4/23/2019   Attestation The Prescription Monitoring Report for this patient was reviewed today. Acute Pain Prescriptions Not required given exclusionary diagnoses. .. Chronic Pain Routine Monitoring Possible medication side effects, risk of tolerance/dependence & alternative treatments discussed. Chronic Pain > 50 MEDD -   Chronic Pain > 80 MEDD -       Assessment and Plan  Patient is a 80 y.o. female who presented for routine follow-up.   The active problem list is as follows:      Past Medical History:   Diagnosis Date    Anxiety     Arthritis     Atrial fibrillation ([address]     CAD (coronary artery disease)     Chronic kidney disease     STAGE 4    [facility]) ([facility])     History of cardiovascular stress test 03/28/2018    Lexiscan stress test    History of echocardiogram 09/30/2016    EF 40-45%    Hyperlipidemia     Hypertension     Influenza B     Microcytic anemia     Neuropathy      Marek Campbell was seen today for 1 month follow-up, medication refill and discuss labs. Diagnoses and all orders for this visit:    Permanent atrial fibrillation (HCC)    Chronic combined systolic and diastolic congestive heart failure (HCC)    Arthritis  -     traMADol (ULTRAM) 50 MG tablet; Take 1 tablet by mouth every 8 hours as needed for Pain for up to 30 days. Chronic kidney disease, stage III (moderate) (HCC)    Need for tetanus, diphtheria, and acellular pertussis (Tdap) vaccine  -     Tetanus-Diphth-Acell Pertussis (BOOSTRIX) injection 0.5 mL         Follow up in 1 month for med refill. Patient received tdap today. Greater than 50% of the time during this encounter was spent face-to-face with the patient, counseling them regarding their medical conditions as well as reviewing all pertinent laboratory findings and imaging studies. The patient was discussed with Dr. Adrian Gonzalez today.     Shavon Sheffield DO PGYII  4/23/2019  11:01 AM

## 2019-04-23 NOTE — PROGRESS NOTES
I was present when the patient was in the clinic. I have discussed the case, including pertinent history and exam findings with the resident. I agree with the assessment, plan and orders as documented by the resident.     [address]  4/23/2019

## 2019-05-28 ENCOUNTER — OFFICE VISIT (OUTPATIENT)
Dept: INTERNAL MEDICINE CLINIC | Age: 84
End: 2019-05-28
Payer: MEDICARE

## 2019-05-28 VITALS
HEIGHT: 55 IN | HEART RATE: 72 BPM | BODY MASS INDEX: 25.78 KG/M2 | OXYGEN SATURATION: 98 % | SYSTOLIC BLOOD PRESSURE: 103 MMHG | WEIGHT: 111.4 LBS | TEMPERATURE: 98.5 F | DIASTOLIC BLOOD PRESSURE: 61 MMHG

## 2019-05-28 DIAGNOSIS — I48.21 PERMANENT ATRIAL FIBRILLATION (HCC): ICD-10-CM

## 2019-05-28 DIAGNOSIS — I50.42 CHRONIC COMBINED SYSTOLIC AND DIASTOLIC CONGESTIVE HEART FAILURE (HCC): ICD-10-CM

## 2019-05-28 DIAGNOSIS — N18.30 CHRONIC KIDNEY DISEASE, STAGE III (MODERATE) (HCC): ICD-10-CM

## 2019-05-28 DIAGNOSIS — M19.90 ARTHRITIS: Primary | ICD-10-CM

## 2019-05-28 PROCEDURE — 1090F PRES/ABSN URINE INCON ASSESS: CPT | Performed by: INTERNAL MEDICINE

## 2019-05-28 PROCEDURE — 99213 OFFICE O/P EST LOW 20 MIN: CPT | Performed by: INTERNAL MEDICINE

## 2019-05-28 PROCEDURE — 1036F TOBACCO NON-USER: CPT | Performed by: INTERNAL MEDICINE

## 2019-05-28 PROCEDURE — 1123F ACP DISCUSS/DSCN MKR DOCD: CPT | Performed by: INTERNAL MEDICINE

## 2019-05-28 PROCEDURE — G8417 CALC BMI ABV UP PARAM F/U: HCPCS | Performed by: INTERNAL MEDICINE

## 2019-05-28 PROCEDURE — 4040F PNEUMOC VAC/ADMIN/RCVD: CPT | Performed by: INTERNAL MEDICINE

## 2019-05-28 PROCEDURE — G8427 DOCREV CUR MEDS BY ELIG CLIN: HCPCS | Performed by: INTERNAL MEDICINE

## 2019-05-28 RX ORDER — TRAMADOL HYDROCHLORIDE 50 MG/1
50 TABLET ORAL 2 TIMES DAILY
COMMUNITY
End: 2019-05-28 | Stop reason: SDUPTHER

## 2019-05-28 RX ORDER — TRAMADOL HYDROCHLORIDE 50 MG/1
50 TABLET ORAL 2 TIMES DAILY
Qty: 60 TABLET | Refills: 0 | Status: SHIPPED | OUTPATIENT
Start: 2019-05-28 | End: 2019-06-27

## 2019-05-28 NOTE — PROGRESS NOTES
I was present when the patient was in the clinic. I have discussed the case, including pertinent history and exam findings with the resident. I agree with the assessment, plan and orders as documented by the resident.     [address]  5/28/2019

## 2019-05-28 NOTE — PROGRESS NOTES
[facility]  -Resident Progress Note-     Lala Murcia / : 1931 / MRN [id number] / Marcellus Gomez 2019    Reason for visit:    Chief Complaint   Patient presents with   [address] Maintenance     Due for hep B, and shingles     Date of last visit:  19    Today's Visit  Lala Murcia is a 80 y.o. female who presents to [address] today for follow up for med refill. Coral Sam presents for pain medication refill. She looks well and has no leg edema. She has no acute complaints. She states she continues to follow up with her cardiologist and her next appointment is in July. Review of Systems  All bolded are positive; please see HPI  General:  Fever, chills, diaphoresis, fatigue, malaise, night sweats, weight loss  Psychological:  Anxiety, disorientation, hallucinations  ENT:  Epistaxis, headaches, vertigo, visual changes  Cardiovascular:  Chest pain, irregular heartbeats, palpitations, paroxysmal nocturnal dyspnea  Respiratory:  Shortness of breath, coughing, sputum production, hemoptysis, wheezing, orthopnea  Gastrointestinal:  Nausea, vomiting, diarrhea, heartburn, constipation, abdominal pain, hematemesis, hematochezia, melena, acholic stools  Genito-Urinary:  Dysuria, urgency, frequency, hematuria  Musculoskeletal:  Joint pain, joint stiffness, joint swelling, muscle pain  Neurology:  Headache, focal neurological deficits, weakness, numbness, paresthesia  Derm:  Rashes, ulcers, excoriations, bruising  Extremities:  Decreased ROM, peripheral edema, mottling    Physical Examination  Vitals:  /61   Pulse 72   Temp 98.5 °F (36.9 °C) (Oral)   Ht 4' 7\" (1.397 m)   Wt 111 lb 6.4 oz (50.5 kg)   SpO2 98%   Breastfeeding?  No   BMI 25.89 kg/m²   General Appearance:  awake, alert, and oriented to person, place, time, and purpose; appears stated age and cooperative; no apparent distress no labored breathing  HEENT: NCAT; PERRL; conjunctiva pink, sclera clear  Neck:  no adenopathy, bruit, JVD, tenderness, masses, or nodules; supple, symmetrical, trachea midline, thyroid not enlarged  Lung:  clear to auscultation bilaterally; no use of accessory muscles; no rhonchi, rales, or crackles  Heart:  Irregularly irregular without rub, or gallop + systolic murmur  Abdomen:  soft, nontender, nondistended; normoactive bowel sounds; no organomegaly  Extremities:no edema Venous stasis  Musculokeletal:  no joint swelling, no muscle tenderness. ROM normal in all joints of extremities. Neurologic:  mental status A&Ox3, thought content appropriate; CN II-XII grossly intact; sensation intact, motor strength 5/5 globally; no slurred speech  Osteopathic:  Patient examined in seated position; normal lumbar lordosis and thoracic kyphosis; no TART changes to paraspinal musculature    Laboratory Data -   I have reviewed all the lab results. There are some abnormalities which were discussed with the patient and daughter. Controlled Substances Monitoring:     RX Monitoring 5/28/2019   Attestation The Prescription Monitoring Report for this patient was reviewed today. Acute Pain Prescriptions -   Chronic Pain Routine Monitoring Possible medication side effects, risk of tolerance/dependence & alternative treatments discussed. Chronic Pain > 50 MEDD -   Chronic Pain > 80 MEDD -       Assessment and Plan  Patient is a 80 y.o. female who presented for routine follow-up.   The active problem list is as follows:      Past Medical History:   Diagnosis Date    Anxiety     Arthritis     Atrial fibrillation ([facility] [address]     CAD (coronary artery disease)     Chronic kidney disease     STAGE 4    [facility]est[facility] failure [facility]F) ([facility])     History of cardiovascular stress test 03/28/2018    Lexiscan stress test    History of echocardiogram 09/30/2016    EF 40-45%    Hyperlipidemia     Hypertension     Influenza B     Microcytic anemia     Neuropathy Hermon Callow was seen today for 1 month follow-up and health maintenance. Diagnoses and all orders for this visit:    Arthritis  -     traMADol (ULTRAM) 50 MG tablet; Take 1 tablet by mouth 2 times daily for 30 days. Permanent atrial fibrillation (HCC)    Chronic combined systolic and diastolic congestive heart failure (HCC)    Chronic kidney disease, stage III (moderate) (HCC)         Follow up in 1 month for med refill. Greater than 50% of the time during this encounter was spent face-to-face with the patient, counseling them regarding their medical conditions as well as reviewing all pertinent laboratory findings and imaging studies. The patient was discussed with Dr. Azael Diaz today.     Kait Thomas DO PGYII  5/28/2019  11:01 AM

## 2019-06-25 ENCOUNTER — OFFICE VISIT (OUTPATIENT)
Dept: INTERNAL MEDICINE CLINIC | Age: 84
End: 2019-06-25
Payer: MEDICARE

## 2019-06-25 VITALS
TEMPERATURE: 98.6 F | OXYGEN SATURATION: 94 % | SYSTOLIC BLOOD PRESSURE: 94 MMHG | WEIGHT: 109.6 LBS | HEART RATE: 69 BPM | BODY MASS INDEX: 25.36 KG/M2 | HEIGHT: 55 IN | DIASTOLIC BLOOD PRESSURE: 42 MMHG

## 2019-06-25 DIAGNOSIS — I50.42 CHRONIC COMBINED SYSTOLIC AND DIASTOLIC CONGESTIVE HEART FAILURE (HCC): Primary | ICD-10-CM

## 2019-06-25 DIAGNOSIS — N18.30 CHRONIC KIDNEY DISEASE, STAGE III (MODERATE) (HCC): ICD-10-CM

## 2019-06-25 DIAGNOSIS — M19.90 ARTHRITIS: Chronic | ICD-10-CM

## 2019-06-25 PROCEDURE — 1090F PRES/ABSN URINE INCON ASSESS: CPT | Performed by: INTERNAL MEDICINE

## 2019-06-25 PROCEDURE — 1123F ACP DISCUSS/DSCN MKR DOCD: CPT | Performed by: INTERNAL MEDICINE

## 2019-06-25 PROCEDURE — 99213 OFFICE O/P EST LOW 20 MIN: CPT | Performed by: INTERNAL MEDICINE

## 2019-06-25 PROCEDURE — 4040F PNEUMOC VAC/ADMIN/RCVD: CPT | Performed by: INTERNAL MEDICINE

## 2019-06-25 PROCEDURE — G8417 CALC BMI ABV UP PARAM F/U: HCPCS | Performed by: INTERNAL MEDICINE

## 2019-06-25 PROCEDURE — G8427 DOCREV CUR MEDS BY ELIG CLIN: HCPCS | Performed by: INTERNAL MEDICINE

## 2019-06-25 PROCEDURE — 1036F TOBACCO NON-USER: CPT | Performed by: INTERNAL MEDICINE

## 2019-06-25 RX ORDER — TRAMADOL HYDROCHLORIDE 50 MG/1
50 TABLET ORAL EVERY 12 HOURS PRN
Qty: 60 TABLET | Refills: 0 | Status: SHIPPED | OUTPATIENT
Start: 2019-06-25 | End: 2019-07-23 | Stop reason: SDUPTHER

## 2019-06-25 RX ORDER — TRAMADOL HYDROCHLORIDE 50 MG/1
50 TABLET ORAL EVERY 6 HOURS PRN
COMMUNITY
End: 2019-06-25 | Stop reason: SDUPTHER

## 2019-06-25 NOTE — PROGRESS NOTES
I was present when the patient was in the clinic. I have discussed the case, including pertinent history and exam findings with the resident. I agree with the assessment, plan and orders as documented by the resident.     [address]  6/25/2019

## 2019-06-25 NOTE — PROGRESS NOTES
[facility]  -Resident Progress Note-     Zulma Allen / : 1931 / MRN [id number] / Kee Crews 2019    Reason for visit:    Chief Complaint   Patient presents with   [address] Maintenance     Due for hep B, and shingles     Date of last visit:  19    Today's Visit  Zulma Allen is a 80 y.o. female who presents to [address] today for follow up for med refill. Marii Bellamy presents for pain medication refill. She looks well and has no leg edema. She has no acute complaints. She states she continues to follow up with her cardiologist and her next appointment is in July. Review of Systems  All bolded are positive; please see HPI  General:  Fever, chills, diaphoresis, fatigue, malaise, night sweats, weight loss  Psychological:  Anxiety, disorientation, hallucinations  ENT:  Epistaxis, headaches, vertigo, visual changes  Cardiovascular:  Chest pain, irregular heartbeats, palpitations, paroxysmal nocturnal dyspnea  Respiratory:  Shortness of breath, coughing, sputum production, hemoptysis, wheezing, orthopnea  Gastrointestinal:  Nausea, vomiting, diarrhea, heartburn, constipation, abdominal pain, hematemesis, hematochezia, melena, acholic stools  Genito-Urinary:  Dysuria, urgency, frequency, hematuria  Musculoskeletal:  Joint pain, joint stiffness, joint swelling, muscle pain  Neurology:  Headache, focal neurological deficits, weakness, numbness, paresthesia  Derm:  Rashes, ulcers, excoriations, bruising  Extremities:  Decreased ROM, peripheral edema, mottling    Physical Examination  Vitals:  BP (!) 94/42   Pulse 69   Temp 98.6 °F (37 °C) (Oral)   Ht 4' 7\" (1.397 m)   Wt 109 lb 9.6 oz (49.7 kg)   SpO2 94%   Breastfeeding?  No   BMI 25.47 kg/m²   General Appearance:  awake, alert, and oriented to person, place, time, and purpose; appears stated age and cooperative; no apparent distress no labored breathing  HEENT: NCAT; PERRL; conjunctiva pink, sclera clear  Neck:  no adenopathy, bruit, JVD, tenderness, masses, or nodules; supple, symmetrical, trachea midline, thyroid not enlarged  Lung:  clear to auscultation bilaterally; no use of accessory muscles; no rhonchi, rales, or crackles  Heart:  Irregularly irregular without rub, or gallop + systolic murmur  Abdomen:  soft, nontender, nondistended; normoactive bowel sounds; no organomegaly  Extremities:no edema Venous stasis  Musculokeletal:  no joint swelling, no muscle tenderness. ROM normal in all joints of extremities. Neurologic:  mental status A&Ox3, thought content appropriate; CN II-XII grossly intact; sensation intact, motor strength 5/5 globally; no slurred speech  Osteopathic:  Patient examined in seated position; normal lumbar lordosis and thoracic kyphosis; no TART changes to paraspinal musculature    Laboratory Data -   I have reviewed all the lab results. There are some abnormalities which were discussed with the patient and daughter. Controlled Substances Monitoring:     RX Monitoring 6/25/2019   Attestation -   Acute Pain Prescriptions -   Periodic Controlled Substance Monitoring Possible medication side effects, risk of tolerance/dependence & alternative treatments discussed. Chronic Pain > 50 MEDD Re-evaluated the status of the patient's underlying condition causing pain. Chronic Pain > 80 MEDD -       Assessment and Plan  Patient is a 80 y.o. female who presented for routine follow-up.   The active problem list is as follows:      Past Medical History:   Diagnosis Date    Anxiety     Arthritis     Atrial fibrillation ([address]     CAD (coronary artery disease)     Chronic kidney disease     STAGE 4    [facility]     History of cardiovascular stress test 03/28/2018    Lexiscan stress test    History of echocardiogram 09/30/2016    EF 40-45%    Hyperlipidemia     Hypertension     Influenza B     Microcytic anemia     Neuropathy      Carlee Santos was seen today for 1 month follow-up and health maintenance. Diagnoses and all orders for this visit:    Chronic combined systolic and diastolic congestive heart failure ([facility] [address]  -     COMPREHENSIVE METABOLIC PANEL; Future  -     CBC WITH AUTO DIFFERENTIAL; Future    Arthritis  -     traMADol (ULTRAM) 50 MG tablet; Take 1 tablet by mouth every 12 hours as needed for Pain for up to 30 days. Chronic kidney disease, stage III (moderate) ([facility])         Follow up in 1 month for med refill. Follow with cardiologist in July. Greater than 50% of the time during this encounter was spent face-to-face with the patient, counseling them regarding their medical conditions as well as reviewing all pertinent laboratory findings and imaging studies. The patient was discussed with Dr. iVnny Paz today.     Enedina Love DO PGYII  6/25/2019  11:05 AM

## 2019-07-16 ENCOUNTER — HOSPITAL ENCOUNTER (OUTPATIENT)
Age: 84
Discharge: HOME | End: 2019-07-16
Payer: MEDICARE

## 2019-07-16 DIAGNOSIS — I50.42 CHRONIC COMBINED SYSTOLIC AND DIASTOLIC CONGESTIVE HEART FAILURE (HCC): ICD-10-CM

## 2019-07-16 LAB
ALBUMIN SERPL-MCNC: 4 G/DL (ref 3.5–5.2)
ALP BLD-CCNC: 104 U/L (ref 35–104)
ALT SERPL-CCNC: 6 U/L (ref 0–32)
ANION GAP SERPL CALCULATED.3IONS-SCNC: 13 MMOL/L (ref 7–16)
AST SERPL-CCNC: 20 U/L (ref 0–31)
BASOPHILS ABSOLUTE: 0.03 E9/L (ref 0–0.2)
BASOPHILS RELATIVE PERCENT: 0.5 % (ref 0–2)
BILIRUB SERPL-MCNC: 0.6 MG/DL (ref 0–1.2)
BUN BLDV-MCNC: 55 MG/DL (ref 8–23)
CALCIUM SERPL-MCNC: 9.4 MG/DL (ref 8.6–10.2)
CHLORIDE BLD-SCNC: 100 MMOL/L (ref 98–107)
CO2: 23 MMOL/L (ref 22–29)
CREAT SERPL-MCNC: 2 MG/DL (ref 0.5–1)
EOSINOPHILS ABSOLUTE: 0.14 E9/L (ref 0.05–0.5)
EOSINOPHILS RELATIVE PERCENT: 2.3 % (ref 0–6)
GFR AFRICAN AMERICAN: 28
GFR NON-AFRICAN AMERICAN: 24 ML/MIN/1.73
GLUCOSE BLD-MCNC: 95 MG/DL (ref 74–99)
HCT VFR BLD CALC: 37 % (ref 34–48)
HEMOGLOBIN: 12.1 G/DL (ref 11.5–15.5)
IMMATURE GRANULOCYTES #: 0.02 E9/L
IMMATURE GRANULOCYTES %: 0.3 % (ref 0–5)
LYMPHOCYTES ABSOLUTE: 0.84 E9/L (ref 1.5–4)
LYMPHOCYTES RELATIVE PERCENT: 14.1 % (ref 20–42)
MCH RBC QN AUTO: 29.3 PG (ref 26–35)
MCHC RBC AUTO-ENTMCNC: 32.7 % (ref 32–34.5)
MCV RBC AUTO: 89.6 FL (ref 80–99.9)
MONOCYTES ABSOLUTE: 0.59 E9/L (ref 0.1–0.95)
MONOCYTES RELATIVE PERCENT: 9.9 % (ref 2–12)
NEUTROPHILS ABSOLUTE: 4.35 E9/L (ref 1.8–7.3)
NEUTROPHILS RELATIVE PERCENT: 72.9 % (ref 43–80)
PDW BLD-RTO: 13.5 FL (ref 11.5–15)
PLATELET # BLD: 171 E9/L (ref 130–450)
PMV BLD AUTO: 11.6 FL (ref 7–12)
POTASSIUM SERPL-SCNC: 4.7 MMOL/L (ref 3.5–5)
RBC # BLD: 4.13 E12/L (ref 3.5–5.5)
SODIUM BLD-SCNC: 136 MMOL/L (ref 132–146)
TOTAL PROTEIN: 6.7 G/DL (ref 6.4–8.3)
WBC # BLD: 6 E9/L (ref 4.5–11.5)

## 2019-07-16 PROCEDURE — 36415 COLL VENOUS BLD VENIPUNCTURE: CPT

## 2019-07-16 PROCEDURE — 80053 COMPREHEN METABOLIC PANEL: CPT

## 2019-07-16 PROCEDURE — 85025 COMPLETE CBC W/AUTO DIFF WBC: CPT

## 2019-07-23 ENCOUNTER — OFFICE VISIT (OUTPATIENT)
Dept: INTERNAL MEDICINE CLINIC | Age: 84
End: 2019-07-23
Payer: MEDICARE

## 2019-07-23 VITALS
TEMPERATURE: 98.2 F | HEART RATE: 62 BPM | OXYGEN SATURATION: 96 % | WEIGHT: 111.8 LBS | SYSTOLIC BLOOD PRESSURE: 88 MMHG | DIASTOLIC BLOOD PRESSURE: 56 MMHG | BODY MASS INDEX: 25.87 KG/M2 | HEIGHT: 55 IN

## 2019-07-23 DIAGNOSIS — M19.90 ARTHRITIS: Chronic | ICD-10-CM

## 2019-07-23 DIAGNOSIS — I48.21 PERMANENT ATRIAL FIBRILLATION (HCC): ICD-10-CM

## 2019-07-23 DIAGNOSIS — Z76.0 ENCOUNTER FOR MEDICATION REFILL: ICD-10-CM

## 2019-07-23 DIAGNOSIS — G62.9 NEUROPATHY: Primary | ICD-10-CM

## 2019-07-23 PROCEDURE — 99213 OFFICE O/P EST LOW 20 MIN: CPT | Performed by: PHYSICIAN ASSISTANT

## 2019-07-23 RX ORDER — TRAMADOL HYDROCHLORIDE 50 MG/1
50 TABLET ORAL EVERY 12 HOURS PRN
Qty: 60 TABLET | Refills: 0 | Status: SHIPPED | OUTPATIENT
Start: 2019-07-23 | End: 2019-08-20 | Stop reason: SDUPTHER

## 2019-07-23 ASSESSMENT — ENCOUNTER SYMPTOMS
COUGH: 0
SHORTNESS OF BREATH: 0
EYE REDNESS: 0
SORE THROAT: 0
SINUS PRESSURE: 0
NAUSEA: 0
CONSTIPATION: 1
BACK PAIN: 0
VOMITING: 0
CHEST TIGHTNESS: 0
EYE DISCHARGE: 0
DIARRHEA: 0
ABDOMINAL PAIN: 0
EYE PAIN: 0

## 2019-07-23 NOTE — PATIENT INSTRUCTIONS
Patient Education        Learning About Peripheral Neuropathy  What is peripheral neuropathy? Peripheral neuropathy is a problem that affects the peripheral nerves. These nerves lead from the spinal cord to other parts of the body. They control your sense of touch, how you feel pain and temperature, and your muscle strength. Most of the time the problem starts in the fingers and toes. As it gets worse, it moves into the limbs. It can cause pain and loss of feeling in the feet, legs, and hands. What causes it? There are several causes:  · Diabetes. This is the most common cause. If your blood sugar is too high for too long, it can damage the nerves. · Kidney problems. These can lead to toxic substances in the blood that damage nerves. · Low levels of thyroid hormone (hypothyroidism). This may cause swelling of the tissues around the nerves, which can put pressure on them. · Infectious or inflammatory diseases. Examples are HIV and Guillain-Barré syndrome. These diseases can damage the nerves. · Peripheral nerve injury. A physical injury can damage the nerves. Injuries can be from things like falls, car crashes, or playing sports. · Overusing alcohol and not eating a healthy diet. These can lead to your body not having enough of certain vitamins, such as vitamin B-12. This can damage nerves. · Being exposed to toxic substances. These include lead, mercury, and certain medicines, such as those used for chemotherapy. Sometimes the cause isn't known. What are the symptoms? Symptoms of peripheral neuropathy can occur slowly over time. The most common ones are:  · Numbness, tightness, and tingling, especially in the legs, hands, and feet. · Loss of feeling. · Burning, shooting, or stabbing pain in the legs, hands, and feet. Often the pain is worse at night. · Weakness and loss of balance. What can happen if you have it?   If peripheral neuropathy gets worse, it can lead to a complete lack of feeling in help prevent problems. Some people find that physical therapy, acupuncture, or transcutaneous electrical nerve stimulation (TENS) helps relieve pain. Follow-up care is a key part of your treatment and safety. Be sure to make and go to all appointments, and call your doctor if you are having problems. It's also a good idea to know your test results and keep a list of the medicines you take. Where can you learn more? Go to [email or web address] and sign in to your [employer] account. Enter P130 in the [employer] box to learn more about \"Learning About Peripheral Neuropathy. \"     If you do not have an account, please click on the \"Sign Up Now\" link. Current as of: July 25, 2018  Content Version: 12.0  © 1667-5397 [employer]. Care instructions adapted under license by [facility] [employer] ([facility]). If you have questions about a medical condition or this instruction, always ask your healthcare professional. [place] [address] any warranty or liability for your use of this information. Patient Education        Atrial Fibrillation: Care Instructions  Your Care Instructions    Atrial fibrillation is an irregular and often fast heartbeat. Treating this condition is important for several reasons. It can cause blood clots, which can travel from your heart to your brain and cause a stroke. If you have a fast heartbeat, you may feel lightheaded, dizzy, and weak. An irregular heartbeat can also increase your risk for heart failure. Atrial fibrillation is often the result of another heart condition, such as high blood pressure or coronary artery disease. Making changes to improve your heart condition will help you stay healthy and active. Follow-up care is a key part of your treatment and safety. Be sure to make and go to all appointments, and call your doctor if you are having problems.  It's also a good idea to know your test results and keep a list of the medicines you take.  How can you care for yourself at home? Medicines    · Take your medicines exactly as prescribed. Call your doctor if you think you are having a problem with your medicine. You will get more details on the specific medicines your doctor prescribes.     · If your doctor has given you a blood thinner to prevent a stroke, be sure you get instructions about how to take your medicine safely. Blood thinners can cause serious bleeding problems.     · Do not take any vitamins, over-the-counter drugs, or herbal products without talking to your doctor first.    Lifestyle changes    · Do not smoke. Smoking can increase your chance of a stroke and heart attack. If you need help quitting, talk to your doctor about stop-smoking programs and medicines. These can increase your chances of quitting for good.     · Eat a heart-healthy diet.     · Stay at a healthy weight. Lose weight if you need to.     · Limit alcohol to 2 drinks a day for men and 1 drink a day for women. Too much alcohol can cause health problems.     · Avoid colds and flu. Get a pneumococcal vaccine shot. If you have had one before, ask your doctor whether you need another dose. Get a flu shot every year. If you must be around people with colds or flu, wash your hands often. Activity    · If your doctor recommends it, get more exercise. Walking is a good choice. Bit by bit, increase the amount you walk every day. Try for at least 30 minutes on most days of the week. You also may want to swim, bike, or do other activities. Your doctor may suggest that you join a cardiac rehabilitation program so that you can have help increasing your physical activity safely.     · Start light exercise if your doctor says it is okay. Even a small amount will help you get stronger, have more energy, and manage stress. Walking is an easy way to get exercise.  Start out by walking a little more than you did in the hospital. Gradually increase the amount you walk.     · When you

## 2019-07-30 DIAGNOSIS — I50.22 CHRONIC SYSTOLIC CONGESTIVE HEART FAILURE (HCC): Chronic | ICD-10-CM

## 2019-07-30 RX ORDER — SPIRONOLACTONE 25 MG/1
25 TABLET ORAL DAILY
Qty: 90 TABLET | Refills: 2 | Status: SHIPPED
Start: 2019-07-30 | End: 2020-04-28 | Stop reason: SDUPTHER

## 2019-08-20 ENCOUNTER — OFFICE VISIT (OUTPATIENT)
Dept: INTERNAL MEDICINE CLINIC | Age: 84
End: 2019-08-20
Payer: MEDICARE

## 2019-08-20 VITALS
OXYGEN SATURATION: 98 % | DIASTOLIC BLOOD PRESSURE: 58 MMHG | WEIGHT: 108.4 LBS | TEMPERATURE: 98.1 F | SYSTOLIC BLOOD PRESSURE: 84 MMHG | HEART RATE: 71 BPM | HEIGHT: 55 IN | BODY MASS INDEX: 25.09 KG/M2

## 2019-08-20 DIAGNOSIS — I50.22 CHRONIC SYSTOLIC CONGESTIVE HEART FAILURE (HCC): Primary | ICD-10-CM

## 2019-08-20 DIAGNOSIS — I48.21 PERMANENT ATRIAL FIBRILLATION (HCC): ICD-10-CM

## 2019-08-20 DIAGNOSIS — N18.4 KIDNEY DISEASE, CHRONIC, STAGE IV (SEVERE, EGFR 15-29 ML/MIN) (HCC): ICD-10-CM

## 2019-08-20 DIAGNOSIS — M19.90 ARTHRITIS: Chronic | ICD-10-CM

## 2019-08-20 PROCEDURE — G8427 DOCREV CUR MEDS BY ELIG CLIN: HCPCS | Performed by: INTERNAL MEDICINE

## 2019-08-20 PROCEDURE — 99213 OFFICE O/P EST LOW 20 MIN: CPT | Performed by: INTERNAL MEDICINE

## 2019-08-20 PROCEDURE — 1036F TOBACCO NON-USER: CPT | Performed by: INTERNAL MEDICINE

## 2019-08-20 PROCEDURE — 4040F PNEUMOC VAC/ADMIN/RCVD: CPT | Performed by: INTERNAL MEDICINE

## 2019-08-20 PROCEDURE — 1123F ACP DISCUSS/DSCN MKR DOCD: CPT | Performed by: INTERNAL MEDICINE

## 2019-08-20 PROCEDURE — G8417 CALC BMI ABV UP PARAM F/U: HCPCS | Performed by: INTERNAL MEDICINE

## 2019-08-20 PROCEDURE — 1090F PRES/ABSN URINE INCON ASSESS: CPT | Performed by: INTERNAL MEDICINE

## 2019-08-20 RX ORDER — TRAMADOL HYDROCHLORIDE 50 MG/1
50 TABLET ORAL EVERY 12 HOURS PRN
Qty: 60 TABLET | Refills: 0 | Status: SHIPPED | OUTPATIENT
Start: 2019-08-20 | End: 2019-09-19

## 2019-08-20 NOTE — PROGRESS NOTES
[facility]  -Resident Progress Note-     Christian Mays / : 1931 / MRN [id number] / Shira Root 2019    Reason for visit:    Chief Complaint   Patient presents with   [address] Maintenance     Due for hep B and shingles     Date of last visit:  19    Today's Visit  Christian Mays is a 80 y.o. female who presents to [address] today for follow up for med refill. Bryan Mensah presents for pain medication refill. She looks well and has no leg edema. She has no acute complaints. She states she continues to follow up with her cardiologist.    Review of Systems  All bolded are positive; please see HPI  General:  Fever, chills, diaphoresis, fatigue, malaise, night sweats, weight loss  Psychological:  Anxiety, disorientation, hallucinations  ENT:  Epistaxis, headaches, vertigo, visual changes  Cardiovascular:  Chest pain, irregular heartbeats, palpitations, paroxysmal nocturnal dyspnea  Respiratory:  Shortness of breath, coughing, sputum production, hemoptysis, wheezing, orthopnea  Gastrointestinal:  Nausea, vomiting, diarrhea, heartburn, constipation, abdominal pain, hematemesis, hematochezia, melena, acholic stools  Genito-Urinary:  Dysuria, urgency, frequency, hematuria  Musculoskeletal:  Joint pain, joint stiffness, joint swelling, muscle pain  Neurology:  Headache, focal neurological deficits, weakness, numbness, paresthesia  Derm:  Rashes, ulcers, excoriations, bruising  Extremities:  Decreased ROM, peripheral edema, mottling    Physical Examination  Vitals:  BP (!) 84/58   Pulse 71   Temp 98.1 °F (36.7 °C) (Oral)   Ht 4' 7\" (1.397 m)   Wt 108 lb 6.4 oz (49.2 kg)   SpO2 98%   Breastfeeding?  No   BMI 25.19 kg/m²   General Appearance:  awake, alert, and oriented to person, place, time, and purpose; appears stated age and cooperative; no apparent distress no labored breathing  HEENT:  NCAT; PERRL; conjunctiva pink, sclera clear  Neck:  no adenopathy, bruit, JVD, tenderness, masses, or nodules; supple, symmetrical, trachea midline, thyroid not enlarged  Lung:  clear to auscultation bilaterally; no use of accessory muscles; no rhonchi, rales, or crackles  Heart:  Irregularly irregular without rub, or gallop + systolic murmur  Abdomen:  soft, nontender, nondistended; normoactive bowel sounds; no organomegaly  Extremities:no edema Venous stasis  Musculokeletal:  no joint swelling, no muscle tenderness. ROM normal in all joints of extremities. Neurologic:  mental status A&Ox3, thought content appropriate; CN II-XII grossly intact; sensation intact, motor strength 5/5 globally; no slurred speech  Osteopathic:  Patient examined in seated position; normal lumbar lordosis and thoracic kyphosis; no TART changes to paraspinal musculature    Laboratory Data -   I have reviewed all the lab results. There are some abnormalities which were discussed with the patient and daughter. Controlled Substances Monitoring:     RX Monitoring 8/20/2019   Attestation -   Acute Pain Prescriptions -   Periodic Controlled Substance Monitoring Possible medication side effects, risk of tolerance/dependence & alternative treatments discussed. Chronic Pain > 50 MEDD Re-evaluated the status of the patient's underlying condition causing pain. Chronic Pain > 80 MEDD -       Assessment and Plan  Patient is a 80 y.o. female who presented for routine follow-up.   The active problem list is as follows:      Past Medical History:   Diagnosis Date    Anxiety     Arthritis     Atrial fibrillation ([facility] [address]     CAD (coronary artery disease)     Chronic kidney disease     STAGE 4    [facility]     History of cardiovascular stress test 03/28/2018    Lexiscan stress test    History of echocardiogram 09/30/2016    EF 40-45%    Hyperlipidemia     Hypertension     Influenza B     Microcytic anemia     Neuropathy      Freya Zhao was seen today for 1 month follow-up and health maintenance. Diagnoses and all orders for this visit:    Chronic systolic congestive heart failure (HCC)    Arthritis  -     traMADol (ULTRAM) 50 MG tablet; Take 1 tablet by mouth every 12 hours as needed for Pain for up to 30 days. Permanent atrial fibrillation (HCC)    Kidney disease, chronic, stage IV (severe, EGFR 15-29 ml/min) (HCC)    Other orders  -     sacubitril-valsartan (ENTRESTO) 24-26 MG per tablet; Take 0.5 tablets by mouth 2 times daily         Follow up in 1 month for med refill. Continue current plan of care and following with cardiologist.    Greater than 50% of the time during this encounter was spent face-to-face with the patient, counseling them regarding their medical conditions as well as reviewing all pertinent laboratory findings and imaging studies. The patient was discussed with Dr. Keiry Piedra today.     Suzanna Klein DO PGYIII  8/20/2019  12:00 PM

## 2019-08-20 NOTE — PROGRESS NOTES
I was present when the patient was in the clinic. I have discussed the case, including pertinent history and exam findings with the resident. I agree with the assessment, plan and orders as documented by the resident.     [address]  8/20/2019

## 2019-09-24 ENCOUNTER — OFFICE VISIT (OUTPATIENT)
Dept: INTERNAL MEDICINE CLINIC | Age: 84
End: 2019-09-24
Payer: MEDICARE

## 2019-09-24 VITALS
HEIGHT: 55 IN | BODY MASS INDEX: 25.55 KG/M2 | TEMPERATURE: 97.7 F | WEIGHT: 110.4 LBS | HEART RATE: 70 BPM | DIASTOLIC BLOOD PRESSURE: 56 MMHG | OXYGEN SATURATION: 98 % | SYSTOLIC BLOOD PRESSURE: 100 MMHG

## 2019-09-24 DIAGNOSIS — N18.4 KIDNEY DISEASE, CHRONIC, STAGE IV (SEVERE, EGFR 15-29 ML/MIN) (HCC): ICD-10-CM

## 2019-09-24 DIAGNOSIS — I50.22 CHRONIC SYSTOLIC CONGESTIVE HEART FAILURE (HCC): ICD-10-CM

## 2019-09-24 DIAGNOSIS — I48.21 PERMANENT ATRIAL FIBRILLATION (HCC): ICD-10-CM

## 2019-09-24 DIAGNOSIS — M19.90 ARTHRITIS: Chronic | ICD-10-CM

## 2019-09-24 DIAGNOSIS — Z23 INFLUENZA VACCINATION ADMINISTERED AT CURRENT VISIT: ICD-10-CM

## 2019-09-24 DIAGNOSIS — G47.00 INSOMNIA, UNSPECIFIED TYPE: Primary | ICD-10-CM

## 2019-09-24 PROCEDURE — 1090F PRES/ABSN URINE INCON ASSESS: CPT | Performed by: INTERNAL MEDICINE

## 2019-09-24 PROCEDURE — 90688 IIV4 VACCINE SPLT 0.5 ML IM: CPT | Performed by: INTERNAL MEDICINE

## 2019-09-24 PROCEDURE — G8417 CALC BMI ABV UP PARAM F/U: HCPCS | Performed by: INTERNAL MEDICINE

## 2019-09-24 PROCEDURE — 1036F TOBACCO NON-USER: CPT | Performed by: INTERNAL MEDICINE

## 2019-09-24 PROCEDURE — 99213 OFFICE O/P EST LOW 20 MIN: CPT | Performed by: INTERNAL MEDICINE

## 2019-09-24 PROCEDURE — G8427 DOCREV CUR MEDS BY ELIG CLIN: HCPCS | Performed by: INTERNAL MEDICINE

## 2019-09-24 PROCEDURE — 1123F ACP DISCUSS/DSCN MKR DOCD: CPT | Performed by: INTERNAL MEDICINE

## 2019-09-24 PROCEDURE — 4040F PNEUMOC VAC/ADMIN/RCVD: CPT | Performed by: INTERNAL MEDICINE

## 2019-09-24 RX ORDER — TRAMADOL HYDROCHLORIDE 50 MG/1
50 TABLET ORAL EVERY 12 HOURS PRN
Qty: 60 TABLET | Refills: 0 | Status: SHIPPED | OUTPATIENT
Start: 2019-09-24 | End: 2019-11-05 | Stop reason: SDUPTHER

## 2019-09-24 RX ORDER — TRAMADOL HYDROCHLORIDE 50 MG/1
50 TABLET ORAL EVERY 12 HOURS PRN
COMMUNITY
End: 2019-09-24

## 2019-09-24 RX ORDER — LANOLIN ALCOHOL/MO/W.PET/CERES
3 CREAM (GRAM) TOPICAL DAILY
Qty: 30 TABLET | Refills: 0 | Status: SHIPPED
Start: 2019-09-24 | End: 2020-03-03

## 2019-09-24 NOTE — PROGRESS NOTES
Vaccine Information Sheet, \"Influenza - Inactivated\"  given to Shantanu Muniz, or parent/legal guardian of  Shantanu Muniz and verbalized understanding. Patient responses:    Have you ever had a reaction to a flu vaccine? No  Do you have any current illness? Yes  Have you ever had Guillian Osborn Syndrome? No  Do you have a serious allergy to any of the follow: Neomycin, Polymyxin, Thimerosal, eggs or egg products? No    Flu vaccine given per order. Please see immunization tab. Risks and benefits explained. Current VIS given.
sclera clear  Neck:  no adenopathy, bruit, JVD, tenderness, masses, or nodules; supple, symmetrical, trachea midline, thyroid not enlarged  Lung:  clear to auscultation bilaterally; no use of accessory muscles; no rhonchi, rales, or crackles  Heart:  Irregularly irregular without rub, or gallop + systolic murmur  Abdomen:  soft, nontender, nondistended; normoactive bowel sounds; no organomegaly  Extremities:no edema Venous stasis  Musculokeletal:  no joint swelling, no muscle tenderness. ROM normal in all joints of extremities. Neurologic:  mental status A&Ox3, thought content appropriate; CN II-XII grossly intact; sensation intact, motor strength 5/5 globally; no slurred speech  Osteopathic:  Patient examined in seated position; normal lumbar lordosis and thoracic kyphosis; no TART changes to paraspinal musculature    Laboratory Data -   I have reviewed all the lab results. There are some abnormalities which were discussed with the patient and daughter. Controlled Substances Monitoring:     RX Monitoring 8/20/2019   Attestation -   Acute Pain Prescriptions -   Periodic Controlled Substance Monitoring Possible medication side effects, risk of tolerance/dependence & alternative treatments discussed. Chronic Pain > 50 MEDD Re-evaluated the status of the patient's underlying condition causing pain. Chronic Pain > 80 MEDD -       Assessment and Plan  Patient is a 80 y.o. female who presented for routine follow-up.   The active problem list is as follows:      Past Medical History:   Diagnosis Date    Anxiety     Arthritis     Atrial fibrillation ([facility]     CAD (coronary artery disease)     Chronic kidney disease     STAGE 4    [facility]     History of cardiovascular stress test 03/28/2018    Lexiscan stress test    History of echocardiogram 09/30/2016    EF 40-45%    Hyperlipidemia     Hypertension     Influenza B     Microcytic anemia     Neuropathy      Janneth Umaña was seen

## 2019-10-28 DIAGNOSIS — E78.5 HYPERLIPIDEMIA, UNSPECIFIED HYPERLIPIDEMIA TYPE: Chronic | ICD-10-CM

## 2019-10-28 DIAGNOSIS — I50.21 ACUTE SYSTOLIC CONGESTIVE HEART FAILURE (HCC): ICD-10-CM

## 2019-10-28 DIAGNOSIS — I10 ESSENTIAL HYPERTENSION: Chronic | ICD-10-CM

## 2019-10-29 ENCOUNTER — HOSPITAL ENCOUNTER (OUTPATIENT)
Age: 84
Discharge: HOME | End: 2019-10-29
Payer: MEDICARE

## 2019-10-29 DIAGNOSIS — I50.22 CHRONIC SYSTOLIC CONGESTIVE HEART FAILURE (HCC): ICD-10-CM

## 2019-10-29 LAB
ANION GAP SERPL CALCULATED.3IONS-SCNC: 14 MMOL/L (ref 7–16)
BASOPHILS ABSOLUTE: 0.03 E9/L (ref 0–0.2)
BASOPHILS RELATIVE PERCENT: 0.5 % (ref 0–2)
BUN BLDV-MCNC: 57 MG/DL (ref 8–23)
CALCIUM SERPL-MCNC: 9.8 MG/DL (ref 8.6–10.2)
CHLORIDE BLD-SCNC: 99 MMOL/L (ref 98–107)
CO2: 26 MMOL/L (ref 22–29)
CREAT SERPL-MCNC: 2 MG/DL (ref 0.5–1)
EOSINOPHILS ABSOLUTE: 0.1 E9/L (ref 0.05–0.5)
EOSINOPHILS RELATIVE PERCENT: 1.6 % (ref 0–6)
GFR AFRICAN AMERICAN: 28
GFR NON-AFRICAN AMERICAN: 23 ML/MIN/1.73
GLUCOSE BLD-MCNC: 101 MG/DL (ref 74–99)
HCT VFR BLD CALC: 35.8 % (ref 34–48)
HEMOGLOBIN: 11.5 G/DL (ref 11.5–15.5)
IMMATURE GRANULOCYTES #: 0.02 E9/L
IMMATURE GRANULOCYTES %: 0.3 % (ref 0–5)
LYMPHOCYTES ABSOLUTE: 0.77 E9/L (ref 1.5–4)
LYMPHOCYTES RELATIVE PERCENT: 12.3 % (ref 20–42)
MCH RBC QN AUTO: 30.2 PG (ref 26–35)
MCHC RBC AUTO-ENTMCNC: 32.1 % (ref 32–34.5)
MCV RBC AUTO: 94 FL (ref 80–99.9)
MONOCYTES ABSOLUTE: 0.57 E9/L (ref 0.1–0.95)
MONOCYTES RELATIVE PERCENT: 9.1 % (ref 2–12)
NEUTROPHILS ABSOLUTE: 4.79 E9/L (ref 1.8–7.3)
NEUTROPHILS RELATIVE PERCENT: 76.2 % (ref 43–80)
PDW BLD-RTO: 13.9 FL (ref 11.5–15)
PLATELET # BLD: 162 E9/L (ref 130–450)
PMV BLD AUTO: 10.3 FL (ref 7–12)
POTASSIUM SERPL-SCNC: 4.7 MMOL/L (ref 3.5–5)
RBC # BLD: 3.81 E12/L (ref 3.5–5.5)
SODIUM BLD-SCNC: 139 MMOL/L (ref 132–146)
WBC # BLD: 6.3 E9/L (ref 4.5–11.5)

## 2019-10-29 PROCEDURE — 36415 COLL VENOUS BLD VENIPUNCTURE: CPT

## 2019-10-29 PROCEDURE — 80048 BASIC METABOLIC PNL TOTAL CA: CPT

## 2019-10-29 PROCEDURE — 85025 COMPLETE CBC W/AUTO DIFF WBC: CPT

## 2019-10-30 DIAGNOSIS — I10 ESSENTIAL HYPERTENSION: Chronic | ICD-10-CM

## 2019-10-30 DIAGNOSIS — I50.21 ACUTE SYSTOLIC CONGESTIVE HEART FAILURE (HCC): ICD-10-CM

## 2019-10-30 DIAGNOSIS — E78.5 HYPERLIPIDEMIA, UNSPECIFIED HYPERLIPIDEMIA TYPE: Chronic | ICD-10-CM

## 2019-10-30 RX ORDER — CARVEDILOL 3.12 MG/1
TABLET ORAL
Qty: 180 TABLET | Refills: 3 | Status: CANCELLED | OUTPATIENT
Start: 2019-10-30

## 2019-10-30 RX ORDER — SIMVASTATIN 40 MG
TABLET ORAL
Qty: 90 TABLET | Refills: 3 | Status: CANCELLED | OUTPATIENT
Start: 2019-10-30

## 2019-10-31 RX ORDER — CARVEDILOL 3.12 MG/1
TABLET ORAL
Qty: 180 TABLET | Refills: 3 | Status: SHIPPED
Start: 2019-10-31 | End: 2020-10-27 | Stop reason: SDUPTHER

## 2019-10-31 RX ORDER — SIMVASTATIN 40 MG
TABLET ORAL
Qty: 90 TABLET | Refills: 3 | Status: SHIPPED
Start: 2019-10-31 | End: 2020-10-07 | Stop reason: SDUPTHER

## 2019-11-05 ENCOUNTER — OFFICE VISIT (OUTPATIENT)
Dept: INTERNAL MEDICINE CLINIC | Age: 84
End: 2019-11-05
Payer: MEDICARE

## 2019-11-05 VITALS
TEMPERATURE: 97.8 F | DIASTOLIC BLOOD PRESSURE: 54 MMHG | SYSTOLIC BLOOD PRESSURE: 102 MMHG | OXYGEN SATURATION: 93 % | BODY MASS INDEX: 26.01 KG/M2 | HEART RATE: 60 BPM | HEIGHT: 55 IN | WEIGHT: 112.4 LBS

## 2019-11-05 DIAGNOSIS — Z76.0 ENCOUNTER FOR MEDICATION REFILL: ICD-10-CM

## 2019-11-05 DIAGNOSIS — I50.22 CHRONIC SYSTOLIC CONGESTIVE HEART FAILURE (HCC): ICD-10-CM

## 2019-11-05 DIAGNOSIS — N18.4 KIDNEY DISEASE, CHRONIC, STAGE IV (SEVERE, EGFR 15-29 ML/MIN) (HCC): ICD-10-CM

## 2019-11-05 DIAGNOSIS — I48.21 PERMANENT ATRIAL FIBRILLATION (HCC): ICD-10-CM

## 2019-11-05 DIAGNOSIS — M19.90 ARTHRITIS: Primary | Chronic | ICD-10-CM

## 2019-11-05 PROCEDURE — 1090F PRES/ABSN URINE INCON ASSESS: CPT | Performed by: INTERNAL MEDICINE

## 2019-11-05 PROCEDURE — 99212 OFFICE O/P EST SF 10 MIN: CPT | Performed by: INTERNAL MEDICINE

## 2019-11-05 PROCEDURE — 4040F PNEUMOC VAC/ADMIN/RCVD: CPT | Performed by: INTERNAL MEDICINE

## 2019-11-05 PROCEDURE — 1123F ACP DISCUSS/DSCN MKR DOCD: CPT | Performed by: INTERNAL MEDICINE

## 2019-11-05 PROCEDURE — 1036F TOBACCO NON-USER: CPT | Performed by: INTERNAL MEDICINE

## 2019-11-05 PROCEDURE — G8427 DOCREV CUR MEDS BY ELIG CLIN: HCPCS | Performed by: INTERNAL MEDICINE

## 2019-11-05 PROCEDURE — G8417 CALC BMI ABV UP PARAM F/U: HCPCS | Performed by: INTERNAL MEDICINE

## 2019-11-05 PROCEDURE — 99213 OFFICE O/P EST LOW 20 MIN: CPT | Performed by: INTERNAL MEDICINE

## 2019-11-05 PROCEDURE — G8482 FLU IMMUNIZE ORDER/ADMIN: HCPCS | Performed by: INTERNAL MEDICINE

## 2019-11-05 RX ORDER — TRAMADOL HYDROCHLORIDE 50 MG/1
50 TABLET ORAL EVERY 12 HOURS PRN
Qty: 60 TABLET | Refills: 0 | Status: SHIPPED | OUTPATIENT
Start: 2019-11-05 | End: 2019-12-03 | Stop reason: SDUPTHER

## 2019-11-12 RX ORDER — BUMETANIDE 1 MG/1
1 TABLET ORAL DAILY
Qty: 30 TABLET | Refills: 1 | Status: SHIPPED | OUTPATIENT
Start: 2019-11-12 | End: 2020-01-07 | Stop reason: SDUPTHER

## 2019-12-03 ENCOUNTER — OFFICE VISIT (OUTPATIENT)
Dept: INTERNAL MEDICINE CLINIC | Age: 84
End: 2019-12-03
Payer: MEDICARE

## 2019-12-03 VITALS
DIASTOLIC BLOOD PRESSURE: 54 MMHG | HEIGHT: 55 IN | HEART RATE: 65 BPM | WEIGHT: 111.8 LBS | SYSTOLIC BLOOD PRESSURE: 96 MMHG | BODY MASS INDEX: 25.87 KG/M2 | TEMPERATURE: 97.5 F

## 2019-12-03 DIAGNOSIS — I50.22 CHRONIC SYSTOLIC CONGESTIVE HEART FAILURE (HCC): Primary | ICD-10-CM

## 2019-12-03 DIAGNOSIS — Z76.0 ENCOUNTER FOR MEDICATION REFILL: ICD-10-CM

## 2019-12-03 DIAGNOSIS — N18.4 KIDNEY DISEASE, CHRONIC, STAGE IV (SEVERE, EGFR 15-29 ML/MIN) (HCC): ICD-10-CM

## 2019-12-03 DIAGNOSIS — I48.21 PERMANENT ATRIAL FIBRILLATION (HCC): ICD-10-CM

## 2019-12-03 DIAGNOSIS — M19.90 ARTHRITIS: Chronic | ICD-10-CM

## 2019-12-03 PROCEDURE — G8417 CALC BMI ABV UP PARAM F/U: HCPCS | Performed by: INTERNAL MEDICINE

## 2019-12-03 PROCEDURE — G8427 DOCREV CUR MEDS BY ELIG CLIN: HCPCS | Performed by: INTERNAL MEDICINE

## 2019-12-03 PROCEDURE — 4040F PNEUMOC VAC/ADMIN/RCVD: CPT | Performed by: INTERNAL MEDICINE

## 2019-12-03 PROCEDURE — 1036F TOBACCO NON-USER: CPT | Performed by: INTERNAL MEDICINE

## 2019-12-03 PROCEDURE — G8482 FLU IMMUNIZE ORDER/ADMIN: HCPCS | Performed by: INTERNAL MEDICINE

## 2019-12-03 PROCEDURE — 1123F ACP DISCUSS/DSCN MKR DOCD: CPT | Performed by: INTERNAL MEDICINE

## 2019-12-03 PROCEDURE — 99213 OFFICE O/P EST LOW 20 MIN: CPT | Performed by: INTERNAL MEDICINE

## 2019-12-03 PROCEDURE — 1090F PRES/ABSN URINE INCON ASSESS: CPT | Performed by: INTERNAL MEDICINE

## 2019-12-03 RX ORDER — TRAMADOL HYDROCHLORIDE 50 MG/1
50 TABLET ORAL EVERY 12 HOURS PRN
Qty: 60 TABLET | Refills: 0 | Status: SHIPPED | OUTPATIENT
Start: 2019-12-03 | End: 2020-01-07 | Stop reason: SDUPTHER

## 2020-01-01 ENCOUNTER — OFFICE VISIT (OUTPATIENT)
Dept: ENT CLINIC | Age: 85
End: 2020-01-01
Payer: MEDICARE

## 2020-01-01 ENCOUNTER — PROCEDURE VISIT (OUTPATIENT)
Dept: AUDIOLOGY | Age: 85
End: 2020-01-01
Payer: MEDICARE

## 2020-01-01 VITALS — WEIGHT: 111.3 LBS | BODY MASS INDEX: 25.03 KG/M2 | HEIGHT: 56 IN

## 2020-01-01 DIAGNOSIS — N18.4 CKD (CHRONIC KIDNEY DISEASE) STAGE 4, GFR 15-29 ML/MIN (HCC): ICD-10-CM

## 2020-01-01 LAB
ANION GAP SERPL CALCULATED.3IONS-SCNC: 14 MMOL/L (ref 7–16)
BUN BLDV-MCNC: 59 MG/DL (ref 8–23)
CALCIUM SERPL-MCNC: 9.6 MG/DL (ref 8.6–10.2)
CHLORIDE BLD-SCNC: 101 MMOL/L (ref 98–107)
CO2: 23 MMOL/L (ref 22–29)
CREAT SERPL-MCNC: 2.1 MG/DL (ref 0.5–1)
GFR AFRICAN AMERICAN: 27
GFR NON-AFRICAN AMERICAN: 22 ML/MIN/1.73
GLUCOSE BLD-MCNC: 103 MG/DL (ref 74–99)
POTASSIUM SERPL-SCNC: 5.1 MMOL/L (ref 3.5–5)
SODIUM BLD-SCNC: 138 MMOL/L (ref 132–146)

## 2020-01-01 PROCEDURE — G8482 FLU IMMUNIZE ORDER/ADMIN: HCPCS | Performed by: NURSE PRACTITIONER

## 2020-01-01 PROCEDURE — 4040F PNEUMOC VAC/ADMIN/RCVD: CPT | Performed by: NURSE PRACTITIONER

## 2020-01-01 PROCEDURE — G8420 CALC BMI NORM PARAMETERS: HCPCS | Performed by: NURSE PRACTITIONER

## 2020-01-01 PROCEDURE — 1123F ACP DISCUSS/DSCN MKR DOCD: CPT | Performed by: NURSE PRACTITIONER

## 2020-01-01 PROCEDURE — 99203 OFFICE O/P NEW LOW 30 MIN: CPT | Performed by: NURSE PRACTITIONER

## 2020-01-01 PROCEDURE — 1090F PRES/ABSN URINE INCON ASSESS: CPT | Performed by: NURSE PRACTITIONER

## 2020-01-01 PROCEDURE — 92557 COMPREHENSIVE HEARING TEST: CPT | Performed by: AUDIOLOGIST

## 2020-01-01 PROCEDURE — 92567 TYMPANOMETRY: CPT | Performed by: AUDIOLOGIST

## 2020-01-01 PROCEDURE — G8427 DOCREV CUR MEDS BY ELIG CLIN: HCPCS | Performed by: NURSE PRACTITIONER

## 2020-01-01 PROCEDURE — 1036F TOBACCO NON-USER: CPT | Performed by: NURSE PRACTITIONER

## 2020-01-01 RX ORDER — FLUTICASONE PROPIONATE 50 MCG
2 SPRAY, SUSPENSION (ML) NASAL DAILY
Qty: 2 BOTTLE | Refills: 1 | Status: SHIPPED
Start: 2020-01-01 | End: 2020-01-01

## 2020-01-01 ASSESSMENT — ENCOUNTER SYMPTOMS
EYES NEGATIVE: 1
STRIDOR: 0
GASTROINTESTINAL NEGATIVE: 1
RESPIRATORY NEGATIVE: 1
SHORTNESS OF BREATH: 0
ABDOMINAL PAIN: 0

## 2020-01-07 ENCOUNTER — OFFICE VISIT (OUTPATIENT)
Dept: INTERNAL MEDICINE CLINIC | Age: 85
End: 2020-01-07
Payer: MEDICARE

## 2020-01-07 VITALS
SYSTOLIC BLOOD PRESSURE: 96 MMHG | WEIGHT: 110.4 LBS | BODY MASS INDEX: 25.55 KG/M2 | DIASTOLIC BLOOD PRESSURE: 52 MMHG | TEMPERATURE: 97.3 F | HEIGHT: 55 IN | HEART RATE: 65 BPM

## 2020-01-07 PROCEDURE — 99213 OFFICE O/P EST LOW 20 MIN: CPT | Performed by: INTERNAL MEDICINE

## 2020-01-07 PROCEDURE — 4040F PNEUMOC VAC/ADMIN/RCVD: CPT | Performed by: INTERNAL MEDICINE

## 2020-01-07 PROCEDURE — G8417 CALC BMI ABV UP PARAM F/U: HCPCS | Performed by: INTERNAL MEDICINE

## 2020-01-07 PROCEDURE — G8482 FLU IMMUNIZE ORDER/ADMIN: HCPCS | Performed by: INTERNAL MEDICINE

## 2020-01-07 PROCEDURE — G8427 DOCREV CUR MEDS BY ELIG CLIN: HCPCS | Performed by: INTERNAL MEDICINE

## 2020-01-07 PROCEDURE — 1090F PRES/ABSN URINE INCON ASSESS: CPT | Performed by: INTERNAL MEDICINE

## 2020-01-07 PROCEDURE — 1123F ACP DISCUSS/DSCN MKR DOCD: CPT | Performed by: INTERNAL MEDICINE

## 2020-01-07 PROCEDURE — 1036F TOBACCO NON-USER: CPT | Performed by: INTERNAL MEDICINE

## 2020-01-07 RX ORDER — BUMETANIDE 1 MG/1
1 TABLET ORAL DAILY
Qty: 30 TABLET | Refills: 3 | Status: SHIPPED
Start: 2020-01-07 | End: 2020-05-05 | Stop reason: SDUPTHER

## 2020-01-07 RX ORDER — TRAMADOL HYDROCHLORIDE 50 MG/1
50 TABLET ORAL EVERY 12 HOURS PRN
Qty: 60 TABLET | Refills: 0 | Status: SHIPPED | OUTPATIENT
Start: 2020-01-07 | End: 2020-02-04 | Stop reason: SDUPTHER

## 2020-01-07 ASSESSMENT — PATIENT HEALTH QUESTIONNAIRE - PHQ9
SUM OF ALL RESPONSES TO PHQ9 QUESTIONS 1 & 2: 0
SUM OF ALL RESPONSES TO PHQ QUESTIONS 1-9: 0
2. FEELING DOWN, DEPRESSED OR HOPELESS: 0
1. LITTLE INTEREST OR PLEASURE IN DOING THINGS: 0
SUM OF ALL RESPONSES TO PHQ QUESTIONS 1-9: 0

## 2020-01-07 NOTE — PROGRESS NOTES
[facility]  -Resident Progress Note-     Danya Reyes / : 1931 / MRN [id number] / Melia Monreal 2020    Reason for visit:    Chief Complaint   Patient presents with    Medication Refill     Date of last visit:  12/3/19    Today's Visit  Danya Reyes is a 80 y.o. female who presents [address] [facility] [facility] for follow up for med refill. Anushka Mendoza presents for pain medication refill. She looks well and has no leg edema. She states she continues to follow up with her cardiologist. No acute complaints. Review of Systems  All bolded are positive; please see HPI  General:  Fever, chills, diaphoresis, fatigue, malaise, night sweats, weight loss  Psychological:  Anxiety, disorientation, hallucinations  ENT:  Epistaxis, headaches, vertigo, visual changes  Cardiovascular:  Chest pain, irregular heartbeats, palpitations, paroxysmal nocturnal dyspnea  Respiratory:  Shortness of breath, coughing, sputum production, hemoptysis, wheezing, orthopnea  Gastrointestinal:  Nausea, vomiting, diarrhea, heartburn, constipation, abdominal pain, hematemesis, hematochezia, melena, acholic stools  Genito-Urinary:  Dysuria, urgency, frequency, hematuria  Musculoskeletal:  Joint pain, joint stiffness, joint swelling, muscle pain  Neurology:  Headache, focal neurological deficits, weakness, numbness, paresthesia  Derm:  Rashes, ulcers, excoriations, bruising  Extremities:  Decreased ROM, peripheral edema, mottling    Physical Examination  Vitals:  BP (!) 96/52   Pulse 65   Temp 97.3 °F (36.3 °C) (Oral)   Ht 4' 7\" (1.397 m)   Wt 110 lb 6.4 oz (50.1 kg)   Breastfeeding?  No   BMI 25.66 kg/m²   General Appearance:  awake, alert, and oriented to person, place, time, and purpose; appears stated age and cooperative; no apparent distress no labored breathing  HEENT:  NCAT; PERRL; conjunctiva pink, sclera clear  Neck:  no adenopathy, bruit, JVD, tenderness, masses, or nodules; Future  -     BASIC METABOLIC PANEL; Future  -     LIPID PANEL; Future    Vitamin D deficiency  -     Vitamin D 25 Hydroxy; Future    Arthritis  -     traMADol (ULTRAM) 50 MG tablet; Take 1 tablet by mouth every 12 hours as needed for Pain for up to 30 days. Permanent atrial fibrillation    Kidney disease, chronic, stage IV (severe, EGFR 15-29 ml/min) ([facility])    Encounter for medication refill    Other orders  -     bumetanide (BUMEX) 1 MG tablet; Take 1 tablet by mouth daily Breakfast         Follow up in 1 month for med refill. Continue current plan of care and following with cardiologist. Eliquis 5mg samples given, instructed to take 1/2 pill BID    Greater than 50% of the time during this encounter was spent face-to-face with the patient, counseling them regarding their medical conditions as well as reviewing all pertinent laboratory findings and imaging studies. The patient was discussed with Dr. Jhon Marcelino today.     Cecilia Carrizales DO PGYIII  1/7/2020  12:41 PM

## 2020-01-07 NOTE — PROGRESS NOTES
I was present when the patient was in the clinic. I have discussed the case, including pertinent history and exam findings with the resident. I agree with the assessment, plan and orders as documented by the resident.     [address]  1/7/2020

## 2020-01-09 VITALS
HEART RATE: 120 BPM | HEIGHT: 56 IN | SYSTOLIC BLOOD PRESSURE: 102 MMHG | BODY MASS INDEX: 24.52 KG/M2 | DIASTOLIC BLOOD PRESSURE: 70 MMHG | WEIGHT: 109 LBS

## 2020-01-11 NOTE — PROGRESS NOTES
Cardiology Progress Note  Dr. Bishnu Acevedo      Referring Physician: No referring provider defined for this encounter. CHIEF COMPLAINT: No chief complaint on file. HISTORY OF PRESENT ILLNESS:   Patient is 80years old lady atrial fibrillation, nonischemic cardiomyopathy, congestive heart failure, patient is here for follow-up appointment. Patient denies any chest pain, no shortness of breath, no lightheadedness, no dizziness, no palpitations, no pedal edema, no PND, no orthopnea, no syncope, no presyncopal episodes. Functional capacity is at baseline      Past Medical History:   Diagnosis Date    Anxiety     Arthritis     Atrial fibrillation ([address]     CAD (coronary artery disease)     Chronic kidney disease     STAGE 4    [facility]     History of cardiovascular stress test 03/28/2018    Lexiscan stress test    History of echocardiogram 09/30/2016    EF 40-45%    Hyperlipidemia     Hypertension     Influenza B     Microcytic anemia     Neuropathy     Pulmonary valve insufficiency          Past Surgical History:   Procedure Laterality Date    CHOLECYSTECTOMY, LAPAROSCOPIC      HYSTERECTOMY      RHINOPLASTY           Current Outpatient Medications   Medication Sig Dispense Refill    bumetanide (BUMEX) 1 MG tablet Take 1 tablet by mouth daily Breakfast 30 tablet 3    traMADol (ULTRAM) 50 MG tablet Take 1 tablet by mouth every 12 hours as needed for Pain for up to 30 days.  60 tablet 0    apixaban (ELIQUIS) 5 MG TABS tablet Take 0.5 tablets by mouth 2 times daily 28 tablet 0    sacubitril-valsartan (ENTRESTO) 24-26 MG per tablet Take 0.5 tablets by mouth 2 times daily 28 tablet 0    carvedilol (COREG) 3.125 MG tablet TAKE 1 TABLET BY MOUTH TWICE DAILY WITH MEALS 180 tablet 3    simvastatin (ZOCOR) 40 MG tablet TAKE 1 TABLET BY MOUTH EVERY NIGHT 90 tablet 3    melatonin 3 MG TABS tablet Take 1 tablet by mouth daily 30 tablet 0    spironolactone (ALDACTONE) 25 MG uterus cancer    Heart Attack Father         at 68    Cancer Sister         breast cancer    Other Other         Sepsis       REVIEW OF SYSTEMS:     CONSTITUTIONAL:  negative for  fevers, chills, sweats, + fatigue  HEENT:  negative for  tinnitus, earaches, nasal congestion and epistaxis  RESPIRATORY:  negative for  dry cough, cough with sputum, wheezing and hemoptysis  GASTROINTESTINAL:  negative for nausea, vomiting, diarrhea, constipation, pruritus and jaundice  HEMATOLOGIC/LYMPHATIC:  negative for easy bruising, bleeding, lymphadenopathy and petechiae  ENDOCRINE:  negative for heat intolerance, cold intolerance, tremor, hair loss and diabetic symptoms including neither polyuria nor polydipsia nor blurred vision  MUSCULOSKELETAL:  negative for  myalgias, arthralgias, joint swelling, stiff joints and decreased range of motion  NEUROLOGICAL:  negative for memory problems, speech problems, visual disturbance, dysphagia, weakness and numbness      PHYSICAL EXAM:   CONSTITUTIONAL:  awake, alert, cooperative, no apparent distress, and appears stated age  HEAD:  normocepalic, without obvious abnormality, atraumatic  NECK:  Supple, symmetrical, trachea midline, no adenopathy, thyroid symmetric, not enlarged and no tenderness, skin normal  LUNGS:  No increased work of breathing, good air exchange, clear to auscultation bilaterally, no crackles or wheezing  CARDIOVASCULAR:  Normal apical impulse, regularly irregular, normal S1 and S2, no S3, 3/6 systolic murmur at the apex, 3/6 systolic murmur at the left lower sternal border, 3/6 diastolic murmur at the right upper sternal border, no edema, no JVD, no carotid bruit. ABDOMEN:  Soft, nontender, no masses, no hepatomegaly, no splenomegaly, BS+  MUSCULOSKELETAL:  No clubbing no cyanosis. there is no redness, warmth, or swelling of the joints  full range of motion noted  NEUROLOGIC:  Alert, awake,oriented x3  SKIN:  no bruising or bleeding, normal skin color, texture, turgor and no redness, warmth, or swelling        DATA:    I personally reviewed the admission EKG with the following interpretation: Atrial flutter with slow ventricular response nonspecific T wave changes    EKG - (12/12/2018) I personally reviewed the EKG with the following interpretation: Atrial flutter, controlled ventricular response,        ECHO: 3/26/18   Compared to prior echo, no significant changes noted. Technically adequate   study. Normal left ventricular wall thickness. Ejection fraction is visually estimated at 20%. moderate global wall hypokinesis   Indeterminate diastolic function. No evidence of left ventricular mass or thrombus noted. Moderate to severe mitral regurgitation is present. Mild mitral stenosis. Mild aortic regurgitation is noted. Moderate tricuspid regurgitation. RVSP is 35 mmHg. Pulmonary hypertension is mild . There is a small pericardial effusion noted. Stress Test: 3/28/18   Findings: There is global hypokinesis. No focal defects are identified.       The gated left ventricular ejection fraction is 34 %.              Angiography:   Cardiology Labs:   BMP:    Lab Results   Component Value Date     10/29/2019    K 4.7 10/29/2019    CL 99 10/29/2019    CO2 26 10/29/2019    BUN 57 10/29/2019     CMP:    Lab Results   Component Value Date     10/29/2019    K 4.7 10/29/2019    CL 99 10/29/2019    CO2 26 10/29/2019    BUN 57 10/29/2019    PROT 6.7 07/16/2019     CBC:    Lab Results   Component Value Date    WBC 6.3 10/29/2019    RBC 3.81 10/29/2019    HGB 11.5 10/29/2019    HCT 35.8 10/29/2019    MCV 94.0 10/29/2019    RDW 13.9 10/29/2019     10/29/2019     PT/INR:  No results found for: PTINR  PT/INR Warfarin:  No components found for: PTPATWAR, PTINRWAR  PTT:    Lab Results   Component Value Date    APTT 34.3 06/27/2016     PTT Heparin:  No components found for: APTTHEP  Magnesium:    Lab Results   Component Value Date    MG 2.3 03/24/2018 TSH:    Lab Results   Component Value Date    TSH 0.658 09/12/2018     TROPONIN:  No components found for: TROP  BNP:  No results found for: BNP  FASTING LIPID PANEL:    Lab Results   Component Value Date    CHOL 139 09/12/2018    HDL 65 09/12/2018    TRIG 89 09/12/2018     No orders to display     I have personally reviewed the laboratory, cardiac diagnostic and radiographic testing as outlined above:      IMPRESSION:   #1:  Chronic systolic (congestive) heart failure :  Compensated. will continue current treatment             #2:  Chronic atrial fibrillation :  Controlled ventricular response, we will continue current treatment, will continue chronic anticoagulation with Eliquis                #3:  Nonrheumatic pulmonary valve insufficiency              #4:  Nonrheumatic mitral (valve) insufficiency:  Moderate                  #5:  Nonrheumatic tricuspid (valve) insufficiency :  Moderate                 #6: Pulmonary hypertension, unspecified :  Mild               #7: Hyperlipidemia, unspecified              #8: Essential (primary) hypertension:  controlled, continue current treatment. #9: Chronic kidney disease, stage 4 (severe)          #10: Long term (current) use of anticoagulants                    RECOMMENDATIONS:   1. Continue current treatment    2. CHF: Daily weight, take an extra Bumex for weight gain of more than 2-3 pounds in 24 hours, compliance with diuretics, low-salt diet were all advised. 3.  Increase risk of bleeding due to being on anti-coagulation, symptoms and signs of bleeding discussed with patient, patient was advised to seek medical attention at the earliest symptoms or signs of bleeding. 4.  Follow-up with  as scheduled    5.   Follow-up with Dr. Linh Batista in 6 months, sooner if symptomatic for any reason      I have reviewed my findings and recommendations with patient    Electronically signed by Ramin Bryant MD on 1/11/2020 at 4:50 AM  NOTE: This report

## 2020-01-15 ENCOUNTER — OFFICE VISIT (OUTPATIENT)
Dept: CARDIOLOGY CLINIC | Age: 85
End: 2020-01-15
Payer: MEDICARE

## 2020-01-15 VITALS
WEIGHT: 112 LBS | DIASTOLIC BLOOD PRESSURE: 60 MMHG | HEART RATE: 54 BPM | BODY MASS INDEX: 25.19 KG/M2 | HEIGHT: 56 IN | RESPIRATION RATE: 16 BRPM | SYSTOLIC BLOOD PRESSURE: 102 MMHG

## 2020-01-15 PROCEDURE — 99214 OFFICE O/P EST MOD 30 MIN: CPT | Performed by: INTERNAL MEDICINE

## 2020-01-15 PROCEDURE — 93000 ELECTROCARDIOGRAM COMPLETE: CPT | Performed by: INTERNAL MEDICINE

## 2020-01-28 ENCOUNTER — HOSPITAL ENCOUNTER (OUTPATIENT)
Age: 85
Discharge: HOME | End: 2020-01-28
Payer: MEDICARE

## 2020-01-28 LAB
ANION GAP SERPL CALCULATED.3IONS-SCNC: 13 MMOL/L (ref 7–16)
BASOPHILS ABSOLUTE: 0.03 E9/L (ref 0–0.2)
BASOPHILS RELATIVE PERCENT: 0.5 % (ref 0–2)
BUN BLDV-MCNC: 61 MG/DL (ref 8–23)
CALCIUM SERPL-MCNC: 9.7 MG/DL (ref 8.6–10.2)
CHLORIDE BLD-SCNC: 99 MMOL/L (ref 98–107)
CHOLESTEROL, TOTAL: 126 MG/DL (ref 0–199)
CO2: 25 MMOL/L (ref 22–29)
CREAT SERPL-MCNC: 2 MG/DL (ref 0.5–1)
EOSINOPHILS ABSOLUTE: 0.2 E9/L (ref 0.05–0.5)
EOSINOPHILS RELATIVE PERCENT: 3.4 % (ref 0–6)
GFR AFRICAN AMERICAN: 28
GFR NON-AFRICAN AMERICAN: 23 ML/MIN/1.73
GLUCOSE BLD-MCNC: 119 MG/DL (ref 74–99)
HCT VFR BLD CALC: 36.6 % (ref 34–48)
HDLC SERPL-MCNC: 54 MG/DL
HEMOGLOBIN: 11.8 G/DL (ref 11.5–15.5)
IMMATURE GRANULOCYTES #: 0.04 E9/L
IMMATURE GRANULOCYTES %: 0.7 % (ref 0–5)
LDL CHOLESTEROL CALCULATED: 54 MG/DL (ref 0–99)
LYMPHOCYTES ABSOLUTE: 0.8 E9/L (ref 1.5–4)
LYMPHOCYTES RELATIVE PERCENT: 13.5 % (ref 20–42)
MCH RBC QN AUTO: 30 PG (ref 26–35)
MCHC RBC AUTO-ENTMCNC: 32.2 % (ref 32–34.5)
MCV RBC AUTO: 93.1 FL (ref 80–99.9)
MONOCYTES ABSOLUTE: 0.51 E9/L (ref 0.1–0.95)
MONOCYTES RELATIVE PERCENT: 8.6 % (ref 2–12)
NEUTROPHILS ABSOLUTE: 4.34 E9/L (ref 1.8–7.3)
NEUTROPHILS RELATIVE PERCENT: 73.3 % (ref 43–80)
PDW BLD-RTO: 14.1 FL (ref 11.5–15)
PLATELET # BLD: 173 E9/L (ref 130–450)
PMV BLD AUTO: 10.4 FL (ref 7–12)
POTASSIUM SERPL-SCNC: 4.9 MMOL/L (ref 3.5–5)
RBC # BLD: 3.93 E12/L (ref 3.5–5.5)
SODIUM BLD-SCNC: 137 MMOL/L (ref 132–146)
TRIGL SERPL-MCNC: 92 MG/DL (ref 0–149)
VITAMIN D 25-HYDROXY: 45 NG/ML (ref 30–100)
VLDLC SERPL CALC-MCNC: 18 MG/DL
WBC # BLD: 5.9 E9/L (ref 4.5–11.5)

## 2020-01-28 PROCEDURE — 80048 BASIC METABOLIC PNL TOTAL CA: CPT

## 2020-01-28 PROCEDURE — 36415 COLL VENOUS BLD VENIPUNCTURE: CPT

## 2020-01-28 PROCEDURE — 82306 VITAMIN D 25 HYDROXY: CPT

## 2020-01-28 PROCEDURE — 80061 LIPID PANEL: CPT

## 2020-01-28 PROCEDURE — 85025 COMPLETE CBC W/AUTO DIFF WBC: CPT

## 2020-02-04 ENCOUNTER — OFFICE VISIT (OUTPATIENT)
Dept: INTERNAL MEDICINE CLINIC | Age: 85
End: 2020-02-04
Payer: MEDICARE

## 2020-02-04 VITALS
TEMPERATURE: 97.6 F | HEIGHT: 56 IN | HEART RATE: 62 BPM | SYSTOLIC BLOOD PRESSURE: 90 MMHG | WEIGHT: 111.2 LBS | DIASTOLIC BLOOD PRESSURE: 54 MMHG | BODY MASS INDEX: 25.01 KG/M2

## 2020-02-04 PROCEDURE — G8420 CALC BMI NORM PARAMETERS: HCPCS | Performed by: INTERNAL MEDICINE

## 2020-02-04 PROCEDURE — 1036F TOBACCO NON-USER: CPT | Performed by: INTERNAL MEDICINE

## 2020-02-04 PROCEDURE — 99213 OFFICE O/P EST LOW 20 MIN: CPT | Performed by: INTERNAL MEDICINE

## 2020-02-04 PROCEDURE — 1123F ACP DISCUSS/DSCN MKR DOCD: CPT | Performed by: INTERNAL MEDICINE

## 2020-02-04 PROCEDURE — 4040F PNEUMOC VAC/ADMIN/RCVD: CPT | Performed by: INTERNAL MEDICINE

## 2020-02-04 PROCEDURE — G8427 DOCREV CUR MEDS BY ELIG CLIN: HCPCS | Performed by: INTERNAL MEDICINE

## 2020-02-04 PROCEDURE — 1090F PRES/ABSN URINE INCON ASSESS: CPT | Performed by: INTERNAL MEDICINE

## 2020-02-04 PROCEDURE — G8482 FLU IMMUNIZE ORDER/ADMIN: HCPCS | Performed by: INTERNAL MEDICINE

## 2020-02-04 RX ORDER — TRAMADOL HYDROCHLORIDE 50 MG/1
50 TABLET ORAL EVERY 12 HOURS PRN
Qty: 60 TABLET | Refills: 0 | Status: SHIPPED
Start: 2020-02-04 | End: 2020-03-03 | Stop reason: SDUPTHER

## 2020-02-04 NOTE — PROGRESS NOTES
I was present when the patient was in the clinic. I have discussed the case, including pertinent history and exam findings with the resident. I agree with the assessment, plan and orders as documented by the resident.     [address]  2/4/2020

## 2020-02-04 NOTE — PROGRESS NOTES
[facility]  -Resident Progress Note-     Filippo Alonso / : 1931 / MRN [id number] / Sabrina Charlton 2020    Reason for visit:    Chief Complaint   Patient presents with    Medication Refill    Results     Date of last visit:  20    Today's Visit  Filippo Alonso is a 80 y.o. female who presents to [address] today for follow up for med refill. Kamila Caballero presents for pain medication refill. She looks well and has no leg edema. She states she continues to follow up with her cardiologist, scheduled for follow up in 6 months. No acute complaints.       Review of Systems  All bolded are positive; please see HPI  General:  Fever, chills, diaphoresis, fatigue, malaise, night sweats, weight loss  Psychological:  Anxiety, disorientation, hallucinations  ENT:  Epistaxis, headaches, vertigo, visual changes  Cardiovascular:  Chest pain, irregular heartbeats, palpitations, paroxysmal nocturnal dyspnea  Respiratory:  Shortness of breath, coughing, sputum production, hemoptysis, wheezing, orthopnea  Gastrointestinal:  Nausea, vomiting, diarrhea, heartburn, constipation, abdominal pain, hematemesis, hematochezia, melena, acholic stools  Genito-Urinary:  Dysuria, urgency, frequency, hematuria  Musculoskeletal:  Joint pain, joint stiffness, joint swelling, muscle pain  Neurology:  Headache, focal neurological deficits, weakness, numbness, paresthesia  Derm:  Rashes, ulcers, excoriations, bruising  Extremities:  Decreased ROM, peripheral edema, mottling    Physical Examination  Vitals:  BP (!) 90/54   Pulse 62   Temp 97.6 °F (36.4 °C) (Oral)   Ht 4' 8\" (1.422 m)   Wt 111 lb 3.2 oz (50.4 kg)   Breastfeeding No   BMI 24.93 kg/m²   General Appearance:  awake, alert, and oriented to person, place, time, and purpose; appears stated age and cooperative; no apparent distress no labored breathing  HEENT:  NCAT; PERRL; conjunctiva pink, sclera clear  Neck:  no was seen today for medication refill and results. Diagnoses and all orders for this visit:    Arthritis  -     traMADol (ULTRAM) 50 MG tablet; Take 1 tablet by mouth every 12 hours as needed for Pain for up to 30 days. Chronic systolic congestive heart failure (HCC)    Permanent atrial fibrillation    Kidney disease, chronic, stage IV (severe, EGFR 15-29 ml/min) ([facility]         Follow up in 1 month for med refill. Continue current plan of care and following with cardiologist. Mary New samples given. Greater than 50% of the time during this encounter was spent face-to-face with the patient, counseling them regarding their medical conditions as well as reviewing all pertinent laboratory findings and imaging studies. The patient was discussed with Dr. Joana Peter today.     Coty Woods DO PGYIII  2/4/2020  12:50 PM

## 2020-03-03 ENCOUNTER — OFFICE VISIT (OUTPATIENT)
Dept: INTERNAL MEDICINE CLINIC | Age: 85
End: 2020-03-03
Payer: MEDICARE

## 2020-03-03 VITALS
BODY MASS INDEX: 25.33 KG/M2 | TEMPERATURE: 97.6 F | SYSTOLIC BLOOD PRESSURE: 96 MMHG | DIASTOLIC BLOOD PRESSURE: 58 MMHG | HEART RATE: 60 BPM | HEIGHT: 56 IN | WEIGHT: 112.6 LBS

## 2020-03-03 PROCEDURE — G8417 CALC BMI ABV UP PARAM F/U: HCPCS | Performed by: INTERNAL MEDICINE

## 2020-03-03 PROCEDURE — G8482 FLU IMMUNIZE ORDER/ADMIN: HCPCS | Performed by: INTERNAL MEDICINE

## 2020-03-03 PROCEDURE — 99213 OFFICE O/P EST LOW 20 MIN: CPT | Performed by: INTERNAL MEDICINE

## 2020-03-03 PROCEDURE — 1123F ACP DISCUSS/DSCN MKR DOCD: CPT | Performed by: INTERNAL MEDICINE

## 2020-03-03 PROCEDURE — G8427 DOCREV CUR MEDS BY ELIG CLIN: HCPCS | Performed by: INTERNAL MEDICINE

## 2020-03-03 PROCEDURE — 1090F PRES/ABSN URINE INCON ASSESS: CPT | Performed by: INTERNAL MEDICINE

## 2020-03-03 PROCEDURE — 4040F PNEUMOC VAC/ADMIN/RCVD: CPT | Performed by: INTERNAL MEDICINE

## 2020-03-03 PROCEDURE — 1036F TOBACCO NON-USER: CPT | Performed by: INTERNAL MEDICINE

## 2020-03-03 RX ORDER — TRAMADOL HYDROCHLORIDE 50 MG/1
50 TABLET ORAL EVERY 12 HOURS PRN
Qty: 60 TABLET | Refills: 0 | Status: SHIPPED
Start: 2020-03-03 | End: 2020-03-31 | Stop reason: SDUPTHER

## 2020-03-03 NOTE — PROGRESS NOTES
refill. Diagnoses and all orders for this visit:    Arthritis  -     traMADol (ULTRAM) 50 MG tablet; Take 1 tablet by mouth every 12 hours as needed for Pain for up to 30 days. Chronic systolic congestive heart failure (HCC)  -     sacubitril-valsartan (ENTRESTO) 24-26 MG per tablet; Take 0.5 tablets by mouth 2 times daily         Follow up in 1 month for med refill. Continue current plan of care and following with cardiologist. Eliquis samples given. Greater than 50% of the time during this encounter was spent face-to-face with the patient, counseling them regarding their medical conditions as well as reviewing all pertinent laboratory findings and imaging studies. The patient was discussed with Dr. Khloe Draper today.     Annie Hernandez DO PGYIII  3/3/2020  1:43 PM

## 2020-03-31 RX ORDER — TRAMADOL HYDROCHLORIDE 50 MG/1
50 TABLET ORAL EVERY 12 HOURS PRN
Qty: 60 TABLET | Refills: 0 | Status: SHIPPED
Start: 2020-03-31 | End: 2020-05-05 | Stop reason: SDUPTHER

## 2020-04-28 ENCOUNTER — TELEPHONE (OUTPATIENT)
Dept: INTERNAL MEDICINE CLINIC | Age: 85
End: 2020-04-28

## 2020-04-28 RX ORDER — SPIRONOLACTONE 25 MG/1
25 TABLET ORAL DAILY
Qty: 90 TABLET | Refills: 2 | Status: SHIPPED
Start: 2020-04-28 | End: 2021-01-01 | Stop reason: SDUPTHER

## 2020-04-28 NOTE — TELEPHONE ENCOUNTER
Patients daughter called and stated that she needs her tramadol called into [facility] pharmacy in [facility].

## 2020-05-05 RX ORDER — TRAMADOL HYDROCHLORIDE 50 MG/1
50 TABLET ORAL EVERY 12 HOURS PRN
Qty: 60 TABLET | Refills: 0 | Status: SHIPPED
Start: 2020-05-28 | End: 2020-06-16 | Stop reason: SDUPTHER

## 2020-05-05 RX ORDER — BUMETANIDE 1 MG/1
1 TABLET ORAL DAILY
Qty: 30 TABLET | Refills: 2 | Status: SHIPPED
Start: 2020-05-05 | End: 2020-08-05 | Stop reason: SDUPTHER

## 2020-06-16 RX ORDER — TRAMADOL HYDROCHLORIDE 50 MG/1
50 TABLET ORAL EVERY 12 HOURS PRN
Qty: 60 TABLET | Refills: 0 | Status: SHIPPED | OUTPATIENT
Start: 2020-06-27 | End: 2020-07-27

## 2020-06-29 RX ORDER — SACUBITRIL AND VALSARTAN 24; 26 MG/1; MG/1
0.5 TABLET, FILM COATED ORAL 2 TIMES DAILY
Qty: 28 TABLET | Refills: 3 | Status: SHIPPED
Start: 2020-06-29 | End: 2020-01-01 | Stop reason: SDUPTHER

## 2020-08-05 ENCOUNTER — TELEPHONE (OUTPATIENT)
Dept: ADMINISTRATIVE | Age: 85
End: 2020-08-05

## 2020-08-05 NOTE — TELEPHONE ENCOUNTER
Pt called in to schedule 6 mo FU w/ Dr Adrian Childers, schedule is not open up, please call Adair Garcia at [phone] to schedule this pt

## 2020-08-27 ENCOUNTER — HOSPITAL ENCOUNTER (OUTPATIENT)
Age: 85
Discharge: HOME | End: 2020-08-29
Payer: MEDICARE

## 2020-08-27 LAB
ALBUMIN SERPL-MCNC: 4.3 G/DL (ref 3.5–5.2)
ALP BLD-CCNC: 82 U/L (ref 35–104)
ALT SERPL-CCNC: 12 U/L (ref 0–32)
ANION GAP SERPL CALCULATED.3IONS-SCNC: 21 MMOL/L (ref 7–16)
AST SERPL-CCNC: 24 U/L (ref 0–31)
BASOPHILS ABSOLUTE: 0.04 E9/L (ref 0–0.2)
BASOPHILS RELATIVE PERCENT: 0.7 % (ref 0–2)
BILIRUB SERPL-MCNC: 0.6 MG/DL (ref 0–1.2)
BUN BLDV-MCNC: 74 MG/DL (ref 8–23)
CALCIUM SERPL-MCNC: 9.8 MG/DL (ref 8.6–10.2)
CHLORIDE BLD-SCNC: 98 MMOL/L (ref 98–107)
CHOLESTEROL, TOTAL: 132 MG/DL (ref 0–199)
CO2: 18 MMOL/L (ref 22–29)
CREAT SERPL-MCNC: 2.3 MG/DL (ref 0.5–1)
EOSINOPHILS ABSOLUTE: 0.11 E9/L (ref 0.05–0.5)
EOSINOPHILS RELATIVE PERCENT: 2 % (ref 0–6)
GFR AFRICAN AMERICAN: 24
GFR NON-AFRICAN AMERICAN: 20 ML/MIN/1.73
GLUCOSE BLD-MCNC: 93 MG/DL (ref 74–99)
HCT VFR BLD CALC: 38 % (ref 34–48)
HDLC SERPL-MCNC: 54 MG/DL
HEMOGLOBIN: 12.1 G/DL (ref 11.5–15.5)
IMMATURE GRANULOCYTES #: 0.02 E9/L
IMMATURE GRANULOCYTES %: 0.4 % (ref 0–5)
LDL CHOLESTEROL CALCULATED: 58 MG/DL (ref 0–99)
LYMPHOCYTES ABSOLUTE: 0.78 E9/L (ref 1.5–4)
LYMPHOCYTES RELATIVE PERCENT: 13.9 % (ref 20–42)
MCH RBC QN AUTO: 30 PG (ref 26–35)
MCHC RBC AUTO-ENTMCNC: 31.8 % (ref 32–34.5)
MCV RBC AUTO: 94.1 FL (ref 80–99.9)
MONOCYTES ABSOLUTE: 0.51 E9/L (ref 0.1–0.95)
MONOCYTES RELATIVE PERCENT: 9.1 % (ref 2–12)
NEUTROPHILS ABSOLUTE: 4.17 E9/L (ref 1.8–7.3)
NEUTROPHILS RELATIVE PERCENT: 73.9 % (ref 43–80)
PDW BLD-RTO: 14.1 FL (ref 11.5–15)
PLATELET # BLD: 176 E9/L (ref 130–450)
PMV BLD AUTO: 12 FL (ref 7–12)
POTASSIUM SERPL-SCNC: 4.9 MMOL/L (ref 3.5–5)
RBC # BLD: 4.04 E12/L (ref 3.5–5.5)
SODIUM BLD-SCNC: 137 MMOL/L (ref 132–146)
T4 FREE: 1.29 NG/DL (ref 0.93–1.7)
TOTAL PROTEIN: 6.7 G/DL (ref 6.4–8.3)
TRIGL SERPL-MCNC: 101 MG/DL (ref 0–149)
TSH SERPL DL<=0.05 MIU/L-ACNC: 1.21 UIU/ML (ref 0.27–4.2)
VITAMIN D 25-HYDROXY: 59 NG/ML (ref 30–100)
VLDLC SERPL CALC-MCNC: 20 MG/DL
WBC # BLD: 5.6 E9/L (ref 4.5–11.5)

## 2020-08-27 PROCEDURE — 80053 COMPREHEN METABOLIC PANEL: CPT

## 2020-08-27 PROCEDURE — 84443 ASSAY THYROID STIM HORMONE: CPT

## 2020-08-27 PROCEDURE — 80061 LIPID PANEL: CPT

## 2020-08-27 PROCEDURE — 82306 VITAMIN D 25 HYDROXY: CPT

## 2020-08-27 PROCEDURE — 84439 ASSAY OF FREE THYROXINE: CPT

## 2020-08-27 PROCEDURE — 85025 COMPLETE CBC W/AUTO DIFF WBC: CPT

## 2020-09-23 NOTE — PROGRESS NOTES
Cardiology Progress Note  Dr. Jose Cruz      Referring Physician: No referring provider defined for this encounter. CHIEF COMPLAINT:   Chief Complaint   Patient presents with    Congestive Heart Failure     8 month ov. No cardiac complaints.  Atrial Fibrillation     HISTORY OF PRESENT ILLNESS:   Patient is 80years old lady atrial fibrillation, nonischemic cardiomyopathy, congestive heart failure, patient is here for follow-up appointment. Patient denies any chest pain, no shortness of breath, no lightheadedness, no dizziness, no palpitations, no pedal edema, no PND, no orthopnea, no syncope, no presyncopal episodes. Functional capacity is at baseline      Past Medical History:   Diagnosis Date    Anxiety     Arthritis     Atrial fibrillation ([address]     CAD (coronary artery disease)     Chronic kidney disease     STAGE 4    [facility]     History of cardiovascular stress test 03/28/2018    Lexiscan stress test    History of echocardiogram 09/30/2016    EF 40-45%    Hyperlipidemia     Hypertension     Influenza B     Microcytic anemia     Neuropathy     Pulmonary valve insufficiency          Past Surgical History:   Procedure Laterality Date    CHOLECYSTECTOMY, LAPAROSCOPIC      HYSTERECTOMY      RHINOPLASTY           Current Outpatient Medications   Medication Sig Dispense Refill    ELIQUIS 2.5 MG TABS tablet Take 1 tablet by mouth 2 times daily 60 tablet 5    traMADol (ULTRAM) 50 MG tablet Take 1 tablet by mouth 2 times daily for 30 days.  60 tablet 0    bumetanide (BUMEX) 1 MG tablet Take 1 tablet by mouth daily Breakfast 30 tablet 2    sacubitril-valsartan (ENTRESTO) 24-26 MG per tablet Take 0.5 tablets by mouth 2 times daily 28 tablet 3    spironolactone (ALDACTONE) 25 MG tablet Take 1 tablet by mouth daily 90 tablet 2    carvedilol (COREG) 3.125 MG tablet TAKE 1 TABLET BY MOUTH TWICE DAILY WITH MEALS 180 tablet 3    simvastatin (ZOCOR) 40 MG tablet TAKE 1 TABLET BY MOUTH EVERY NIGHT 90 tablet 3    vitamin D (CHOLECALCIFEROL) 1000 UNIT TABS tablet Take 1 tablet by mouth daily 60 tablet 1    docusate sodium (COLACE) 50 MG capsule Take 1 capsule by mouth 2 times daily as needed for Constipation 30 capsule 0    fluticasone (FLONASE) 50 MCG/ACT nasal spray 2 sprays by Each Nare route daily (Patient not taking: Reported on 9/24/2020) 1 Bottle 0     No current facility-administered medications for this visit. Allergies as of 09/24/2020    (No Known Allergies)       Social History     Socioeconomic History    Marital status:      Spouse name: Not on file    Number of children: 2    Years of education: Not on file    Highest education level: Not on file   Occupational History    Occupation: house wife   Social Needs    Financial resource strain: Not on file    Food insecurity     Worry: Not on file     Inability: Not on file   [employer] needs     Medical: Not on file     Non-medical: Not on file   Tobacco Use    Smoking status: Never Smoker    Smokeless tobacco: Never Used   Substance and Sexual Activity    Alcohol use: Not Currently    Drug use: Never    Sexual activity: Not on file   Lifestyle    Physical activity     Days per week: Not on file     Minutes per session: Not on file    Stress: Not on file   Relationships    Social connections     Talks on phone: Not on file     Gets together: Not on file     Attends Shinto service: Not on file     Active member of club or organization: Not on file     Attends meetings of clubs or organizations: Not on file     Relationship status: Not on file    Intimate partner violence     Fear of current or ex partner: Not on file     Emotionally abused: Not on file     Physically abused: Not on file     Forced sexual activity: Not on file   Other Topics Concern    Not on file   Social History Narrative    She lives with her son in [facility].              Drinks occ hot tea Family History   Problem Relation Age of Onset    Cancer Mother         uterus cancer    Heart Attack Father         at 68    Cancer Sister         breast cancer    Other Other         Sepsis       REVIEW OF SYSTEMS:     CONSTITUTIONAL:  negative for  fevers, chills, sweats, + fatigue  HEENT:  negative for  tinnitus, earaches, nasal congestion and epistaxis  RESPIRATORY:  negative for  dry cough, cough with sputum, wheezing and hemoptysis  GASTROINTESTINAL:  negative for nausea, vomiting, diarrhea, constipation, pruritus and jaundice  HEMATOLOGIC/LYMPHATIC:  negative for easy bruising, bleeding, lymphadenopathy and petechiae  ENDOCRINE:  negative for heat intolerance, cold intolerance, tremor, hair loss and diabetic symptoms including neither polyuria nor polydipsia nor blurred vision  MUSCULOSKELETAL:  negative for  myalgias, arthralgias, joint swelling, stiff joints and decreased range of motion  NEUROLOGICAL:  negative for memory problems, speech problems, visual disturbance, dysphagia, weakness and numbness      PHYSICAL EXAM:   CONSTITUTIONAL:  awake, alert, cooperative, no apparent distress, and appears stated age  HEAD:  normocepalic, without obvious abnormality, atraumatic  NECK:  Supple, symmetrical, trachea midline, no adenopathy, thyroid symmetric, not enlarged and no tenderness, skin normal  LUNGS:  No increased work of breathing, good air exchange, clear to auscultation bilaterally, no crackles or wheezing  CARDIOVASCULAR:  Normal apical impulse, irregularly irregular, normal S1 and S2, no S3, 3/6 systolic murmur at the apex, 3/6 systolic murmur at the left lower sternal border, 3/6 diastolic murmur at the right upper sternal border, no edema, no JVD, no carotid bruit. ABDOMEN:  Soft, nontender, no masses, no hepatomegaly, no splenomegaly, BS+  MUSCULOSKELETAL:  No clubbing no cyanosis. there is no redness, warmth, or swelling of the joints  full range of motion noted  NEUROLOGIC:  Alert, awake,oriented x3  SKIN:  no bruising or bleeding, normal skin color, texture, turgor and no redness, warmth, or swelling    Blood pressure 108/60, pulse 64, resp. rate 16, height 4' 7\" (1.397 m), weight 111 lb (50.3 kg), SpO2 98 %, not currently breastfeeding. DATA:  I personally reviewed the admission EKG with the following interpretation: Atrial fibrillation, with a controlled ventricular response, low QRS voltage, poor R wave progression, nonspecific T wave changes in the lateral leads    EKG -1/15/20 Atrial flutter with slow ventricular response nonspecific T wave changes    ECHO: 3/26/18   Compared to prior echo, no significant changes noted. Technically adequate   study. Normal left ventricular wall thickness. Ejection fraction is visually estimated at 20%. moderate global wall hypokinesis   Indeterminate diastolic function. No evidence of left ventricular mass or thrombus noted. Moderate to severe mitral regurgitation is present. Mild mitral stenosis. Mild aortic regurgitation is noted. Moderate tricuspid regurgitation. RVSP is 35 mmHg. Pulmonary hypertension is mild . There is a small pericardial effusion noted. Stress Test: 3/28/18   Findings: There is global hypokinesis. No focal defects are identified.       The gated left ventricular ejection fraction is 34 %.              Angiography:   Cardiology Labs:   BMP:    Lab Results   Component Value Date     08/27/2020    K 4.9 08/27/2020    CL 98 08/27/2020    CO2 18 08/27/2020    BUN 74 08/27/2020     CMP:    Lab Results   Component Value Date     08/27/2020    K 4.9 08/27/2020    CL 98 08/27/2020    CO2 18 08/27/2020    BUN 74 08/27/2020    PROT 6.7 08/27/2020     CBC:    Lab Results   Component Value Date    WBC 5.6 08/27/2020    RBC 4.04 08/27/2020    HGB 12.1 08/27/2020    HCT 38.0 08/27/2020    MCV 94.1 08/27/2020    RDW 14.1 08/27/2020     08/27/2020     PT/INR:  No results found for: PTINR  PT/INR Warfarin:  No components found for: PTPATWAR, PTINRWAR  PTT:    Lab Results   Component Value Date    APTT 34.3 06/27/2016     PTT Heparin:  No components found for: APTTHEP  Magnesium:    Lab Results   Component Value Date    MG 2.3 03/24/2018     TSH:    Lab Results   Component Value Date    TSH 1.210 08/27/2020     TROPONIN:  No components found for: TROP  BNP:  No results found for: BNP  FASTING LIPID PANEL:    Lab Results   Component Value Date    CHOL 132 08/27/2020    HDL 54 08/27/2020    TRIG 101 08/27/2020     No orders to display     I have personally reviewed the laboratory, cardiac diagnostic and radiographic testing as outlined above:      IMPRESSION:  1. Chronic systolic (congestive) heart failure: Compensated. will continue current treatment            2.  Chronic atrial fibrillation :  Controlled ventricular response, we will continue current treatment, will continue chronic anticoagulation with Eliquis                3.  Nonrheumatic pulmonary valve insufficiency              4.  Nonrheumatic mitral (valve) insufficiency:  Moderate                  5.  Nonrheumatic tricuspid (valve) insufficiency :  Moderate                 6.  Pulmonary hypertension, unspecified :  Mild               7.  Hyperlipidemia, unspecified              8.  Essential (primary) hypertension:  controlled, continue current treatment. 9.  Chronic kidney disease, stage 4 (severe)          10. Long term (current) use of anticoagulants                    RECOMMENDATIONS:   1. Continue current treatment    2. CHF: Daily weight, take an extra Bumex for weight gain of more than 2-3 pounds in 24 hours, compliance with diuretics, low-salt diet were all advised. 3.  Increase risk of bleeding due to being on anti-coagulation, symptoms and signs of bleeding discussed with patient, patient was advised to seek medical attention at the earliest symptoms or signs of bleeding.     4.  Follow-up with  as scheduled    5. Follow-up with Dr. Mckenzie Henderson in 8 months, sooner if symptomatic for any reason      I have reviewed my findings and recommendations with patient    Electronically signed by Gris Cassidy MD on 9/24/2020 at 10:21 AM    NOTE: This report was transcribed using voice recognition software.  Every effort was made to ensure accuracy; however, inadvertent computerized transcription errors may be present

## 2020-09-24 ENCOUNTER — OFFICE VISIT (OUTPATIENT)
Dept: CARDIOLOGY CLINIC | Age: 85
End: 2020-09-24
Payer: MEDICARE

## 2020-09-24 VITALS
BODY MASS INDEX: 25.69 KG/M2 | HEIGHT: 55 IN | HEART RATE: 64 BPM | SYSTOLIC BLOOD PRESSURE: 108 MMHG | OXYGEN SATURATION: 98 % | DIASTOLIC BLOOD PRESSURE: 60 MMHG | RESPIRATION RATE: 16 BRPM | WEIGHT: 111 LBS

## 2020-09-24 PROCEDURE — 93000 ELECTROCARDIOGRAM COMPLETE: CPT | Performed by: INTERNAL MEDICINE

## 2020-09-24 PROCEDURE — 99214 OFFICE O/P EST MOD 30 MIN: CPT | Performed by: INTERNAL MEDICINE

## 2020-12-18 NOTE — PROGRESS NOTES
[facility] Otolaryngology  Dr. Saniya Maya. MARY Klein Ms.Ed. New Consult       Patient Name:  Christiano Alexander  :  1931     CHIEF C/O:    Chief Complaint   Patient presents with    Hearing Problem     hearing loss been going on; now \"hears music at night\"       HISTORY OBTAINED FROM:  Patient, daughter in law    HISTORY OF PRESENT ILLNESS:       Valentín Bee is a 80y.o. year old female, here today for hearing loss and hearing music in her ears at night. She states that her hearing has worsened over the past 6 months. She states that for the past 3 weeks at nighttime while trying to sleep she feels as though she hears music playing. She thinks that is coming from the neighbors house but according to family there is no music playing. She does state noticing hearing loss for several years. She denies any pain or pressure in either ear. She denies any drainage from either ear. She denies any vertigo symptoms. She denies any previous history of noise exposure. She does have a family history of hearing loss from one aunt. She denies any previous diagnosis of Parkinson's, Alzheimer's, or dementia. Past Medical History:   Diagnosis Date    Anxiety     Arthritis     Atrial fibrillation ([address]     CAD (coronary artery disease)     Chronic kidney disease     STAGE 4    [facility]     History of cardiovascular stress test 2018    Lexiscan stress test    History of echocardiogram 2016    EF 40-45%    Hyperlipidemia     Hypertension     Influenza B     Microcytic anemia     Neuropathy     Pulmonary valve insufficiency      Past Surgical History:   Procedure Laterality Date    CHOLECYSTECTOMY, LAPAROSCOPIC      HYSTERECTOMY      RHINOPLASTY         Current Outpatient Medications:     traMADol (ULTRAM) 50 MG tablet, Take 1 tablet by mouth 2 times daily as needed for Pain for up to 30 days. , Disp: 60 tablet, Rfl: 0    sacubitril-valsartan (ENTRESTO) 24-26 MG per tablet, Take 0.5 tablets by mouth 2 times daily, Disp: 28 tablet, Rfl: 3    bumetanide (BUMEX) 1 MG tablet, TAKE 1 TABLET BY MOUTH DAILY BREAKFAST, Disp: 30 tablet, Rfl: 2    carvedilol (COREG) 3.125 MG tablet, Take 1 tablet by mouth 2 times daily, Disp: 180 tablet, Rfl: 1    simvastatin (ZOCOR) 40 MG tablet, TAKE 1 TABLET BY MOUTH EVERY NIGHT, Disp: 90 tablet, Rfl: 1    fluticasone (FLONASE) 50 MCG/ACT nasal spray, SHAKE LIQUID AND USE 1 SPRAY IN EACH NOSTRIL DAILY, Disp: 48 g, Rfl: 3    ELIQUIS 2.5 MG TABS tablet, Take 1 tablet by mouth 2 times daily, Disp: 60 tablet, Rfl: 5    spironolactone (ALDACTONE) 25 MG tablet, Take 1 tablet by mouth daily, Disp: 90 tablet, Rfl: 2    vitamin D (CHOLECALCIFEROL) 1000 UNIT TABS tablet, Take 1 tablet by mouth daily, Disp: 60 tablet, Rfl: 1    docusate sodium (COLACE) 50 MG capsule, Take 1 capsule by mouth 2 times daily as needed for Constipation, Disp: 30 capsule, Rfl: 0  Patient has no known allergies. Social History     Tobacco Use    Smoking status: Never Smoker    Smokeless tobacco: Never Used   Substance Use Topics    Alcohol use: Not Currently    Drug use: Never     Family History   Problem Relation Age of Onset    Cancer Mother         uterus cancer    Heart Attack Father         at 68    Cancer Sister         breast cancer    Other Other         Sepsis       Review of Systems   Constitutional: Negative. Negative for activity change and appetite change. HENT: Positive for hearing loss and tinnitus. Eyes: Negative. Respiratory: Negative. Negative for shortness of breath and stridor. Cardiovascular: Negative. Negative for chest pain and palpitations. Gastrointestinal: Negative. Negative for abdominal pain. Endocrine: Negative. Genitourinary: Negative. Musculoskeletal: Negative. Skin: Negative. Neurological: Negative. Negative for dizziness. Hematological: Negative. Psychiatric/Behavioral: Negative.         Ht 4' 8\"

## 2020-12-18 NOTE — PROGRESS NOTES
This patient was referred for audiometric/tympanometric testing by GERARD Stoner due to a sudden decrease in hearing sensitivity, right ear, with ear pressure/fullness. Audiometry revealed a moderate-to-moderately-severe sensorineural hearing loss, through the frequency range, bilaterally (right ear; worse). Reliability was fair. Speech reception thresholds were in good agreement with the pure tone averages, bilaterally. Speech discrimination scores were 16%, right ear and 38%, left ear at 75-80dBHL. Tympanometry revealed normal middle ear peak pressure and reduced compliance, bilaterally. Ipsilateral acoustic reflexes were absent, bilaterally at 1000Hz. The results were reviewed with the patient and her daughter. It was recommended that patient purchase a personal amplifier, due to hearing loss and poor word recognition, bilaterally. Recommendations for follow up will be made pending physician consult.     Heaven Dorsey CCC/VIRI  Audiologist  [id number]  NPI#:  [id number]

## 2021-01-01 ENCOUNTER — HOSPITAL ENCOUNTER (OUTPATIENT)
Age: 86
Discharge: HOME | End: 2021-09-20
Payer: MEDICARE

## 2021-01-01 ENCOUNTER — HOSPITAL ENCOUNTER (OUTPATIENT)
Age: 86
Discharge: HOME | End: 2021-09-10
Payer: MEDICARE

## 2021-01-01 ENCOUNTER — TELEPHONE (OUTPATIENT)
Dept: CARDIOLOGY CLINIC | Age: 86
End: 2021-01-01

## 2021-01-01 ENCOUNTER — HOSPITAL ENCOUNTER (OUTPATIENT)
Age: 86
Discharge: HOME | End: 2021-09-16
Payer: MEDICARE

## 2021-01-01 ENCOUNTER — HOSPITAL ENCOUNTER (OUTPATIENT)
Dept: GENERAL RADIOLOGY | Age: 86
Discharge: HOME | End: 2021-05-30
Payer: MEDICARE

## 2021-01-01 ENCOUNTER — OFFICE VISIT (OUTPATIENT)
Dept: ENT CLINIC | Age: 86
End: 2021-01-01
Payer: MEDICARE

## 2021-01-01 ENCOUNTER — HOSPITAL ENCOUNTER (OUTPATIENT)
Dept: ULTRASOUND IMAGING | Age: 86
Discharge: HOME | End: 2021-05-13
Payer: MEDICARE

## 2021-01-01 ENCOUNTER — IMMUNIZATION (OUTPATIENT)
Dept: PRIMARY CARE CLINIC | Age: 86
End: 2021-01-01
Payer: MEDICARE

## 2021-01-01 ENCOUNTER — PROCEDURE VISIT (OUTPATIENT)
Dept: AUDIOLOGY | Age: 86
End: 2021-01-01
Payer: MEDICARE

## 2021-01-01 ENCOUNTER — OFFICE VISIT (OUTPATIENT)
Dept: CARDIOLOGY CLINIC | Age: 86
End: 2021-01-01
Payer: MEDICARE

## 2021-01-01 ENCOUNTER — APPOINTMENT (OUTPATIENT)
Dept: GENERAL RADIOLOGY | Age: 86
End: 2021-01-01
Payer: MEDICARE

## 2021-01-01 ENCOUNTER — HOSPITAL ENCOUNTER (OUTPATIENT)
Age: 86
Discharge: HOME | End: 2021-05-30
Payer: MEDICARE

## 2021-01-01 ENCOUNTER — HOSPITAL ENCOUNTER (EMERGENCY)
Age: 86
Discharge: HOME | End: 2021-09-30
Attending: STUDENT IN AN ORGANIZED HEALTH CARE EDUCATION/TRAINING PROGRAM
Payer: MEDICARE

## 2021-01-01 ENCOUNTER — APPOINTMENT (OUTPATIENT)
Dept: CT IMAGING | Age: 86
End: 2021-01-01
Payer: MEDICARE

## 2021-01-01 ENCOUNTER — HOSPITAL ENCOUNTER (OUTPATIENT)
Age: 86
Discharge: HOME | End: 2021-08-30
Payer: MEDICARE

## 2021-01-01 ENCOUNTER — HOSPITAL ENCOUNTER (OUTPATIENT)
Age: 86
Discharge: HOME | End: 2021-09-27
Payer: MEDICARE

## 2021-01-01 VITALS
HEART RATE: 84 BPM | WEIGHT: 114 LBS | HEIGHT: 55 IN | SYSTOLIC BLOOD PRESSURE: 104 MMHG | DIASTOLIC BLOOD PRESSURE: 58 MMHG | BODY MASS INDEX: 26.38 KG/M2

## 2021-01-01 VITALS
SYSTOLIC BLOOD PRESSURE: 92 MMHG | DIASTOLIC BLOOD PRESSURE: 52 MMHG | HEART RATE: 52 BPM | WEIGHT: 109 LBS | TEMPERATURE: 96.7 F | BODY MASS INDEX: 25.22 KG/M2 | HEIGHT: 55 IN | RESPIRATION RATE: 16 BRPM

## 2021-01-01 VITALS
WEIGHT: 113 LBS | HEIGHT: 55 IN | HEART RATE: 70 BPM | SYSTOLIC BLOOD PRESSURE: 94 MMHG | BODY MASS INDEX: 26.15 KG/M2 | RESPIRATION RATE: 20 BRPM | DIASTOLIC BLOOD PRESSURE: 56 MMHG

## 2021-01-01 VITALS — HEIGHT: 55 IN | WEIGHT: 113 LBS | BODY MASS INDEX: 26.15 KG/M2

## 2021-01-01 DIAGNOSIS — N18.4 CKD (CHRONIC KIDNEY DISEASE) STAGE 4, GFR 15-29 ML/MIN (HCC): Primary | ICD-10-CM

## 2021-01-01 DIAGNOSIS — N18.4 CKD (CHRONIC KIDNEY DISEASE) STAGE 4, GFR 15-29 ML/MIN (HCC): ICD-10-CM

## 2021-01-01 DIAGNOSIS — I50.9 ACUTE ON CHRONIC HEART FAILURE, UNSPECIFIED HEART FAILURE TYPE (HCC): ICD-10-CM

## 2021-01-01 DIAGNOSIS — E55.9 VITAMIN D DEFICIENCY: ICD-10-CM

## 2021-01-01 DIAGNOSIS — R60.0 EDEMA OF RIGHT LOWER EXTREMITY: ICD-10-CM

## 2021-01-01 DIAGNOSIS — I50.23 ACUTE ON CHRONIC SYSTOLIC CONGESTIVE HEART FAILURE (HCC): Primary | ICD-10-CM

## 2021-01-01 DIAGNOSIS — Z23 NEED FOR VACCINATION: Primary | ICD-10-CM

## 2021-01-01 DIAGNOSIS — I50.42 CHRONIC COMBINED SYSTOLIC AND DIASTOLIC CONGESTIVE HEART FAILURE (HCC): Primary | ICD-10-CM

## 2021-01-01 DIAGNOSIS — G89.29 CHRONIC PAIN OF RIGHT KNEE: ICD-10-CM

## 2021-01-01 DIAGNOSIS — E78.5 HYPERLIPIDEMIA, UNSPECIFIED HYPERLIPIDEMIA TYPE: ICD-10-CM

## 2021-01-01 DIAGNOSIS — N17.9 ACUTE RENAL FAILURE, UNSPECIFIED ACUTE RENAL FAILURE TYPE (HCC): ICD-10-CM

## 2021-01-01 DIAGNOSIS — I50.42 CHRONIC COMBINED SYSTOLIC AND DIASTOLIC CONGESTIVE HEART FAILURE (HCC): ICD-10-CM

## 2021-01-01 DIAGNOSIS — R53.83 FATIGUE, UNSPECIFIED TYPE: ICD-10-CM

## 2021-01-01 DIAGNOSIS — I50.23 ACUTE ON CHRONIC SYSTOLIC CONGESTIVE HEART FAILURE (HCC): ICD-10-CM

## 2021-01-01 DIAGNOSIS — M25.561 CHRONIC PAIN OF RIGHT KNEE: ICD-10-CM

## 2021-01-01 DIAGNOSIS — H65.90 FLUID COLLECTION OF MIDDLE EAR: ICD-10-CM

## 2021-01-01 DIAGNOSIS — I50.21 ACUTE SYSTOLIC (CONGESTIVE) HEART FAILURE (HCC): ICD-10-CM

## 2021-01-01 DIAGNOSIS — R44.0 AUDITORY HALLUCINATIONS: ICD-10-CM

## 2021-01-01 DIAGNOSIS — D64.9 NORMOCYTIC ANEMIA: ICD-10-CM

## 2021-01-01 DIAGNOSIS — I50.21 ACUTE SYSTOLIC (CONGESTIVE) HEART FAILURE (HCC): Primary | ICD-10-CM

## 2021-01-01 DIAGNOSIS — Z71.89 DNR (DO NOT RESUSCITATE) DISCUSSION: Primary | ICD-10-CM

## 2021-01-01 DIAGNOSIS — M19.90 ARTHRITIS: ICD-10-CM

## 2021-01-01 DIAGNOSIS — H65.493 COME (CHRONIC OTITIS MEDIA WITH EFFUSION), BILATERAL: Primary | ICD-10-CM

## 2021-01-01 LAB
ALBUMIN SERPL-MCNC: 3.3 G/DL (ref 3.5–5.2)
ALBUMIN SERPL-MCNC: 3.4 G/DL (ref 3.5–5.2)
ALBUMIN SERPL-MCNC: 4.1 G/DL (ref 3.5–5.2)
ALP BLD-CCNC: 108 U/L (ref 35–104)
ALP BLD-CCNC: 113 U/L (ref 35–104)
ALP BLD-CCNC: 85 U/L (ref 35–104)
ALT SERPL-CCNC: 15 U/L (ref 0–32)
ALT SERPL-CCNC: 21 U/L (ref 0–32)
ALT SERPL-CCNC: 9 U/L (ref 0–32)
ANION GAP SERPL CALCULATED.3IONS-SCNC: 12 MMOL/L (ref 7–16)
ANION GAP SERPL CALCULATED.3IONS-SCNC: 13 MMOL/L (ref 7–16)
ANION GAP SERPL CALCULATED.3IONS-SCNC: 15 MMOL/L (ref 7–16)
ANION GAP SERPL CALCULATED.3IONS-SCNC: 16 MMOL/L (ref 7–16)
ANION GAP SERPL CALCULATED.3IONS-SCNC: 16 MMOL/L (ref 7–16)
ANION GAP SERPL CALCULATED.3IONS-SCNC: 18 MMOL/L (ref 7–16)
ANISOCYTOSIS: ABNORMAL
ANISOCYTOSIS: ABNORMAL
APTT: 35 SEC (ref 24.5–35.1)
AST SERPL-CCNC: 17 U/L (ref 0–31)
AST SERPL-CCNC: 19 U/L (ref 0–31)
AST SERPL-CCNC: 28 U/L (ref 0–31)
BACTERIA: ABNORMAL /HPF
BASOPHILS ABSOLUTE: 0.01 E9/L (ref 0–0.2)
BASOPHILS ABSOLUTE: 0.04 E9/L (ref 0–0.2)
BASOPHILS ABSOLUTE: 0.04 E9/L (ref 0–0.2)
BASOPHILS RELATIVE PERCENT: 0.2 % (ref 0–2)
BASOPHILS RELATIVE PERCENT: 0.5 % (ref 0–2)
BASOPHILS RELATIVE PERCENT: 0.6 % (ref 0–2)
BILIRUB SERPL-MCNC: 0.5 MG/DL (ref 0–1.2)
BILIRUB SERPL-MCNC: 0.5 MG/DL (ref 0–1.2)
BILIRUB SERPL-MCNC: 0.7 MG/DL (ref 0–1.2)
BILIRUBIN URINE: NEGATIVE
BLOOD CULTURE, ROUTINE: NORMAL
BLOOD, URINE: ABNORMAL
BUN BLDV-MCNC: 110 MG/DL (ref 6–23)
BUN BLDV-MCNC: 113 MG/DL (ref 6–23)
BUN BLDV-MCNC: 123 MG/DL (ref 6–23)
BUN BLDV-MCNC: 127 MG/DL (ref 6–23)
BUN BLDV-MCNC: 52 MG/DL (ref 6–23)
BUN BLDV-MCNC: 53 MG/DL (ref 6–23)
BUN BLDV-MCNC: 83 MG/DL (ref 6–23)
BUN BLDV-MCNC: 98 MG/DL (ref 6–23)
BURR CELLS: ABNORMAL
BURR CELLS: ABNORMAL
CALCIUM SERPL-MCNC: 8.9 MG/DL (ref 8.6–10.2)
CALCIUM SERPL-MCNC: 8.9 MG/DL (ref 8.6–10.2)
CALCIUM SERPL-MCNC: 9 MG/DL (ref 8.6–10.2)
CALCIUM SERPL-MCNC: 9 MG/DL (ref 8.6–10.2)
CALCIUM SERPL-MCNC: 9.2 MG/DL (ref 8.6–10.2)
CALCIUM SERPL-MCNC: 9.2 MG/DL (ref 8.6–10.2)
CALCIUM SERPL-MCNC: 9.4 MG/DL (ref 8.6–10.2)
CALCIUM SERPL-MCNC: 9.9 MG/DL (ref 8.6–10.2)
CHLORIDE BLD-SCNC: 100 MMOL/L (ref 98–107)
CHLORIDE BLD-SCNC: 100 MMOL/L (ref 98–107)
CHLORIDE BLD-SCNC: 102 MMOL/L (ref 98–107)
CHLORIDE BLD-SCNC: 103 MMOL/L (ref 98–107)
CHLORIDE BLD-SCNC: 91 MMOL/L (ref 98–107)
CHLORIDE BLD-SCNC: 92 MMOL/L (ref 98–107)
CHLORIDE BLD-SCNC: 93 MMOL/L (ref 98–107)
CHLORIDE BLD-SCNC: 95 MMOL/L (ref 98–107)
CHOLESTEROL, TOTAL: 118 MG/DL (ref 0–199)
CHP ED QC CHECK: YES
CLARITY: CLEAR
CO2: 18 MMOL/L (ref 22–29)
CO2: 20 MMOL/L (ref 22–29)
CO2: 20 MMOL/L (ref 22–29)
CO2: 21 MMOL/L (ref 22–29)
CO2: 22 MMOL/L (ref 22–29)
CO2: 22 MMOL/L (ref 22–29)
COLOR: YELLOW
CREAT SERPL-MCNC: 1.6 MG/DL (ref 0.5–1)
CREAT SERPL-MCNC: 1.9 MG/DL (ref 0.5–1)
CREAT SERPL-MCNC: 2.3 MG/DL (ref 0.5–1)
CREAT SERPL-MCNC: 2.4 MG/DL (ref 0.5–1)
CREAT SERPL-MCNC: 2.7 MG/DL (ref 0.5–1)
CULTURE, BLOOD 2: NORMAL
EKG ATRIAL RATE: 79 BPM
EKG Q-T INTERVAL: 244 MS
EKG QRS DURATION: 10 MS
EKG QTC CALCULATION (BAZETT): 393 MS
EKG R AXIS: 120 DEGREES
EKG T AXIS: 101 DEGREES
EKG VENTRICULAR RATE: 156 BPM
EOSINOPHILS ABSOLUTE: 0.08 E9/L (ref 0.05–0.5)
EOSINOPHILS ABSOLUTE: 0.08 E9/L (ref 0.05–0.5)
EOSINOPHILS ABSOLUTE: 0.25 E9/L (ref 0.05–0.5)
EOSINOPHILS RELATIVE PERCENT: 1 % (ref 0–6)
EOSINOPHILS RELATIVE PERCENT: 1.2 % (ref 0–6)
EOSINOPHILS RELATIVE PERCENT: 3.6 % (ref 0–6)
EPITHELIAL CELLS, UA: ABNORMAL /HPF
FERRITIN: 277 NG/ML
FOLATE: >20 NG/ML (ref 4.8–24.2)
GFR AFRICAN AMERICAN: 20
GFR AFRICAN AMERICAN: 23
GFR AFRICAN AMERICAN: 24
GFR AFRICAN AMERICAN: 30
GFR AFRICAN AMERICAN: 37
GFR NON-AFRICAN AMERICAN: 17 ML/MIN/1.73
GFR NON-AFRICAN AMERICAN: 19 ML/MIN/1.73
GFR NON-AFRICAN AMERICAN: 20 ML/MIN/1.73
GFR NON-AFRICAN AMERICAN: 25 ML/MIN/1.73
GFR NON-AFRICAN AMERICAN: 30 ML/MIN/1.73
GLUCOSE BLD-MCNC: 112 MG/DL (ref 74–99)
GLUCOSE BLD-MCNC: 113 MG/DL
GLUCOSE BLD-MCNC: 113 MG/DL (ref 74–99)
GLUCOSE BLD-MCNC: 119 MG/DL (ref 74–99)
GLUCOSE BLD-MCNC: 125 MG/DL (ref 74–99)
GLUCOSE BLD-MCNC: 150 MG/DL (ref 74–99)
GLUCOSE BLD-MCNC: 158 MG/DL (ref 74–99)
GLUCOSE BLD-MCNC: 181 MG/DL (ref 74–99)
GLUCOSE BLD-MCNC: 91 MG/DL (ref 74–99)
GLUCOSE URINE: NEGATIVE MG/DL
HCT VFR BLD CALC: 29.7 % (ref 34–48)
HCT VFR BLD CALC: 33.1 % (ref 34–48)
HCT VFR BLD CALC: 34.7 % (ref 34–48)
HDLC SERPL-MCNC: 55 MG/DL
HEMOGLOBIN: 10.3 G/DL (ref 11.5–15.5)
HEMOGLOBIN: 10.6 G/DL (ref 11.5–15.5)
HEMOGLOBIN: 9 G/DL (ref 11.5–15.5)
HYPOCHROMIA: ABNORMAL
IMMATURE GRANULOCYTES #: 0.03 E9/L
IMMATURE GRANULOCYTES #: 0.04 E9/L
IMMATURE GRANULOCYTES #: 0.06 E9/L
IMMATURE GRANULOCYTES %: 0.4 % (ref 0–5)
IMMATURE GRANULOCYTES %: 0.5 % (ref 0–5)
IMMATURE GRANULOCYTES %: 0.9 % (ref 0–5)
IMMATURE RETIC FRACT: 24.7 % (ref 3–15.9)
INR BLD: 3.6
IRON SATURATION: 16 % (ref 15–50)
IRON: 33 MCG/DL (ref 37–145)
KETONES, URINE: ABNORMAL MG/DL
LACTIC ACID, SEPSIS: 1.8 MMOL/L (ref 0.5–1.9)
LACTIC ACID, SEPSIS: 2 MMOL/L (ref 0.5–1.9)
LDL CHOLESTEROL CALCULATED: 45 MG/DL (ref 0–99)
LEUKOCYTE ESTERASE, URINE: NEGATIVE
LYMPHOCYTES ABSOLUTE: 0.4 E9/L (ref 1.5–4)
LYMPHOCYTES ABSOLUTE: 0.48 E9/L (ref 1.5–4)
LYMPHOCYTES ABSOLUTE: 0.85 E9/L (ref 1.5–4)
LYMPHOCYTES RELATIVE PERCENT: 12.1 % (ref 20–42)
LYMPHOCYTES RELATIVE PERCENT: 6 % (ref 20–42)
LYMPHOCYTES RELATIVE PERCENT: 6.1 % (ref 20–42)
MAGNESIUM: 2.1 MG/DL (ref 1.6–2.6)
MCH RBC QN AUTO: 25.9 PG (ref 26–35)
MCH RBC QN AUTO: 26.5 PG (ref 26–35)
MCH RBC QN AUTO: 28.4 PG (ref 26–35)
MCHC RBC AUTO-ENTMCNC: 30.3 % (ref 32–34.5)
MCHC RBC AUTO-ENTMCNC: 30.5 % (ref 32–34.5)
MCHC RBC AUTO-ENTMCNC: 31.1 % (ref 32–34.5)
MCV RBC AUTO: 83.2 FL (ref 80–99.9)
MCV RBC AUTO: 87.6 FL (ref 80–99.9)
MCV RBC AUTO: 93 FL (ref 80–99.9)
METER GLUCOSE: 113 MG/DL (ref 74–99)
MONOCYTES ABSOLUTE: 0.4 E9/L (ref 0.1–0.95)
MONOCYTES ABSOLUTE: 0.51 E9/L (ref 0.1–0.95)
MONOCYTES ABSOLUTE: 0.63 E9/L (ref 0.1–0.95)
MONOCYTES RELATIVE PERCENT: 6 % (ref 2–12)
MONOCYTES RELATIVE PERCENT: 6.5 % (ref 2–12)
MONOCYTES RELATIVE PERCENT: 9 % (ref 2–12)
NEUTROPHILS ABSOLUTE: 5.22 E9/L (ref 1.8–7.3)
NEUTROPHILS ABSOLUTE: 5.68 E9/L (ref 1.8–7.3)
NEUTROPHILS ABSOLUTE: 6.67 E9/L (ref 1.8–7.3)
NEUTROPHILS RELATIVE PERCENT: 74.3 % (ref 43–80)
NEUTROPHILS RELATIVE PERCENT: 85.4 % (ref 43–80)
NEUTROPHILS RELATIVE PERCENT: 85.7 % (ref 43–80)
NITRITE, URINE: NEGATIVE
OVALOCYTES: ABNORMAL
OVALOCYTES: ABNORMAL
PDW BLD-RTO: 14.1 FL (ref 11.5–15)
PDW BLD-RTO: 16 FL (ref 11.5–15)
PDW BLD-RTO: 19.2 FL (ref 11.5–15)
PH UA: 5 (ref 5–9)
PLATELET # BLD: 138 E9/L (ref 130–450)
PLATELET # BLD: 218 E9/L (ref 130–450)
PLATELET # BLD: 252 E9/L (ref 130–450)
PMV BLD AUTO: 11 FL (ref 7–12)
PMV BLD AUTO: 12.1 FL (ref 7–12)
PMV BLD AUTO: ABNORMAL FL (ref 7–12)
POIKILOCYTES: ABNORMAL
POIKILOCYTES: ABNORMAL
POLYCHROMASIA: ABNORMAL
POLYCHROMASIA: ABNORMAL
POTASSIUM SERPL-SCNC: 3.6 MMOL/L (ref 3.5–5)
POTASSIUM SERPL-SCNC: 3.7 MMOL/L (ref 3.5–5)
POTASSIUM SERPL-SCNC: 3.9 MMOL/L (ref 3.5–5)
POTASSIUM SERPL-SCNC: 4.1 MMOL/L (ref 3.5–5)
POTASSIUM SERPL-SCNC: 4.3 MMOL/L (ref 3.5–5)
POTASSIUM SERPL-SCNC: 4.8 MMOL/L (ref 3.5–5)
POTASSIUM SERPL-SCNC: 5.2 MMOL/L (ref 3.5–5)
POTASSIUM SERPL-SCNC: 5.3 MMOL/L (ref 3.5–5)
PRO-BNP: ABNORMAL PG/ML (ref 0–450)
PROTEIN UA: NEGATIVE MG/DL
PROTHROMBIN TIME: 42.1 SEC (ref 9.3–12.4)
RBC # BLD: 3.39 E12/L (ref 3.5–5.5)
RBC # BLD: 3.73 E12/L (ref 3.5–5.5)
RBC # BLD: 3.98 E12/L (ref 3.5–5.5)
RBC UA: ABNORMAL /HPF (ref 0–2)
RETIC HGB EQUIVALENT: 27.3 PG (ref 28.2–36.6)
RETICULOCYTE ABSOLUTE COUNT: 0.08 E12/L
RETICULOCYTE COUNT PCT: 2.3 % (ref 0.4–1.9)
SODIUM BLD-SCNC: 128 MMOL/L (ref 132–146)
SODIUM BLD-SCNC: 129 MMOL/L (ref 132–146)
SODIUM BLD-SCNC: 132 MMOL/L (ref 132–146)
SODIUM BLD-SCNC: 132 MMOL/L (ref 132–146)
SODIUM BLD-SCNC: 133 MMOL/L (ref 132–146)
SODIUM BLD-SCNC: 135 MMOL/L (ref 132–146)
SODIUM BLD-SCNC: 136 MMOL/L (ref 132–146)
SODIUM BLD-SCNC: 142 MMOL/L (ref 132–146)
SPECIFIC GRAVITY UA: 1.01 (ref 1–1.03)
TOTAL IRON BINDING CAPACITY: 212 MCG/DL (ref 250–450)
TOTAL PROTEIN: 5.7 G/DL (ref 6.4–8.3)
TOTAL PROTEIN: 6.6 G/DL (ref 6.4–8.3)
TOTAL PROTEIN: 6.8 G/DL (ref 6.4–8.3)
TRIGL SERPL-MCNC: 90 MG/DL (ref 0–149)
TROPONIN, HIGH SENSITIVITY: 74 NG/L (ref 0–9)
URIC ACID, SERUM: 11 MG/DL (ref 2.4–5.7)
URINE CULTURE, ROUTINE: NORMAL
UROBILINOGEN, URINE: 0.2 E.U./DL
VITAMIN B-12: 541 PG/ML (ref 211–946)
VITAMIN D 25-HYDROXY: 70 NG/ML (ref 30–100)
VLDLC SERPL CALC-MCNC: 18 MG/DL
WBC # BLD: 6.6 E9/L (ref 4.5–11.5)
WBC # BLD: 7 E9/L (ref 4.5–11.5)
WBC # BLD: 7.8 E9/L (ref 4.5–11.5)
WBC UA: ABNORMAL /HPF (ref 0–5)

## 2021-01-01 PROCEDURE — 80048 BASIC METABOLIC PNL TOTAL CA: CPT

## 2021-01-01 PROCEDURE — 82962 GLUCOSE BLOOD TEST: CPT

## 2021-01-01 PROCEDURE — 1090F PRES/ABSN URINE INCON ASSESS: CPT | Performed by: NURSE PRACTITIONER

## 2021-01-01 PROCEDURE — 80053 COMPREHEN METABOLIC PANEL: CPT

## 2021-01-01 PROCEDURE — 36415 COLL VENOUS BLD VENIPUNCTURE: CPT

## 2021-01-01 PROCEDURE — 99283 EMERGENCY DEPT VISIT LOW MDM: CPT

## 2021-01-01 PROCEDURE — G8482 FLU IMMUNIZE ORDER/ADMIN: HCPCS | Performed by: NURSE PRACTITIONER

## 2021-01-01 PROCEDURE — 93005 ELECTROCARDIOGRAM TRACING: CPT | Performed by: STUDENT IN AN ORGANIZED HEALTH CARE EDUCATION/TRAINING PROGRAM

## 2021-01-01 PROCEDURE — 91301 COVID-19, MODERNA VACCINE 100MCG/0.5ML DOSE: CPT | Performed by: PHYSICIAN ASSISTANT

## 2021-01-01 PROCEDURE — 0011A COVID-19, MODERNA VACCINE 100MCG/0.5ML DOSE: CPT | Performed by: PHYSICIAN ASSISTANT

## 2021-01-01 PROCEDURE — 1036F TOBACCO NON-USER: CPT | Performed by: INTERNAL MEDICINE

## 2021-01-01 PROCEDURE — 99215 OFFICE O/P EST HI 40 MIN: CPT | Performed by: INTERNAL MEDICINE

## 2021-01-01 PROCEDURE — G8417 CALC BMI ABV UP PARAM F/U: HCPCS | Performed by: NURSE PRACTITIONER

## 2021-01-01 PROCEDURE — 83880 ASSAY OF NATRIURETIC PEPTIDE: CPT

## 2021-01-01 PROCEDURE — 1123F ACP DISCUSS/DSCN MKR DOCD: CPT | Performed by: INTERNAL MEDICINE

## 2021-01-01 PROCEDURE — G8417 CALC BMI ABV UP PARAM F/U: HCPCS | Performed by: INTERNAL MEDICINE

## 2021-01-01 PROCEDURE — 1123F ACP DISCUSS/DSCN MKR DOCD: CPT | Performed by: NURSE PRACTITIONER

## 2021-01-01 PROCEDURE — G8427 DOCREV CUR MEDS BY ELIG CLIN: HCPCS | Performed by: NURSE PRACTITIONER

## 2021-01-01 PROCEDURE — 0012A COVID-19, MODERNA VACCINE 100MCG/0.5ML DOSE: CPT | Performed by: NURSE PRACTITIONER

## 2021-01-01 PROCEDURE — 1090F PRES/ABSN URINE INCON ASSESS: CPT | Performed by: INTERNAL MEDICINE

## 2021-01-01 PROCEDURE — 70450 CT HEAD/BRAIN W/O DYE: CPT

## 2021-01-01 PROCEDURE — 84484 ASSAY OF TROPONIN QUANT: CPT

## 2021-01-01 PROCEDURE — 71045 X-RAY EXAM CHEST 1 VIEW: CPT

## 2021-01-01 PROCEDURE — 85730 THROMBOPLASTIN TIME PARTIAL: CPT

## 2021-01-01 PROCEDURE — 87040 BLOOD CULTURE FOR BACTERIA: CPT

## 2021-01-01 PROCEDURE — 93000 ELECTROCARDIOGRAM COMPLETE: CPT | Performed by: INTERNAL MEDICINE

## 2021-01-01 PROCEDURE — 83605 ASSAY OF LACTIC ACID: CPT

## 2021-01-01 PROCEDURE — G8427 DOCREV CUR MEDS BY ELIG CLIN: HCPCS | Performed by: INTERNAL MEDICINE

## 2021-01-01 PROCEDURE — 4040F PNEUMOC VAC/ADMIN/RCVD: CPT | Performed by: INTERNAL MEDICINE

## 2021-01-01 PROCEDURE — 73562 X-RAY EXAM OF KNEE 3: CPT

## 2021-01-01 PROCEDURE — 1036F TOBACCO NON-USER: CPT | Performed by: NURSE PRACTITIONER

## 2021-01-01 PROCEDURE — 93010 ELECTROCARDIOGRAM REPORT: CPT | Performed by: INTERNAL MEDICINE

## 2021-01-01 PROCEDURE — 85610 PROTHROMBIN TIME: CPT

## 2021-01-01 PROCEDURE — 91301 COVID-19, MODERNA VACCINE 100MCG/0.5ML DOSE: CPT | Performed by: NURSE PRACTITIONER

## 2021-01-01 PROCEDURE — 2580000003 HC RX 258: Performed by: STUDENT IN AN ORGANIZED HEALTH CARE EDUCATION/TRAINING PROGRAM

## 2021-01-01 PROCEDURE — 99214 OFFICE O/P EST MOD 30 MIN: CPT | Performed by: INTERNAL MEDICINE

## 2021-01-01 PROCEDURE — 93971 EXTREMITY STUDY: CPT

## 2021-01-01 PROCEDURE — 99213 OFFICE O/P EST LOW 20 MIN: CPT | Performed by: NURSE PRACTITIONER

## 2021-01-01 PROCEDURE — 94761 N-INVAS EAR/PLS OXIMETRY MLT: CPT

## 2021-01-01 PROCEDURE — 83735 ASSAY OF MAGNESIUM: CPT

## 2021-01-01 PROCEDURE — 4040F PNEUMOC VAC/ADMIN/RCVD: CPT | Performed by: NURSE PRACTITIONER

## 2021-01-01 PROCEDURE — 92567 TYMPANOMETRY: CPT | Performed by: AUDIOLOGIST

## 2021-01-01 PROCEDURE — 81001 URINALYSIS AUTO W/SCOPE: CPT

## 2021-01-01 PROCEDURE — 87088 URINE BACTERIA CULTURE: CPT

## 2021-01-01 PROCEDURE — 85025 COMPLETE CBC W/AUTO DIFF WBC: CPT

## 2021-01-01 RX ORDER — METOLAZONE 5 MG/1
5 TABLET ORAL SEE ADMIN INSTRUCTIONS
Qty: 30 TABLET | Refills: 0 | Status: SHIPPED
Start: 2021-01-01 | End: 2021-01-01

## 2021-01-01 RX ORDER — 0.9 % SODIUM CHLORIDE 0.9 %
500 INTRAVENOUS SOLUTION INTRAVENOUS ONCE
Status: COMPLETED | OUTPATIENT
Start: 2021-01-01 | End: 2021-01-01

## 2021-01-01 RX ORDER — SODIUM CHLORIDE 0.9 % (FLUSH) 0.9 %
SYRINGE (ML) INJECTION
Status: DISCONTINUED
Start: 2021-01-01 | End: 2021-01-01 | Stop reason: HOSPADM

## 2021-01-01 RX ORDER — BUMETANIDE 1 MG/1
1 TABLET ORAL 2 TIMES DAILY
Qty: 180 TABLET | Refills: 1 | Status: SHIPPED | OUTPATIENT
Start: 2021-01-01

## 2021-01-01 RX ORDER — METOLAZONE 5 MG/1
TABLET ORAL
Qty: 90 TABLET | Refills: 0 | Status: SHIPPED | OUTPATIENT
Start: 2021-01-01

## 2021-01-01 RX ADMIN — SODIUM CHLORIDE 500 ML: 9 INJECTION, SOLUTION INTRAVENOUS at 16:04

## 2021-01-01 ASSESSMENT — ENCOUNTER SYMPTOMS
COLOR CHANGE: 0
SORE THROAT: 0
WHEEZING: 0
SHORTNESS OF BREATH: 0
ABDOMINAL PAIN: 0
CONSTIPATION: 0
EYES NEGATIVE: 1
ABDOMINAL PAIN: 0
ABDOMINAL DISTENTION: 0
COUGH: 0
RESPIRATORY NEGATIVE: 1
SINUS PRESSURE: 0
NAUSEA: 0
DIARRHEA: 0
VOMITING: 0
STRIDOR: 0
GASTROINTESTINAL NEGATIVE: 1
BACK PAIN: 0
SHORTNESS OF BREATH: 0

## 2021-01-19 PROBLEM — G62.9 NEUROPATHY: Status: ACTIVE | Noted: 2021-01-01

## 2021-02-05 NOTE — PROGRESS NOTES
[facility] Otolaryngology  Dr. Venkata Cruz. Eduardo Rivera. Ms.Ed        Patient Name:  Trav Olvera  :  1931     CHIEF C/O:    Chief Complaint   Patient presents with    Hearing Problem     6 week f/u hearing; seems to be a little better       HISTORY OBTAINED FROM:  patient    HISTORY OF PRESENT ILLNESS:       Bibiana Vidal is a 80y.o. year old female, here today for follow up of hearing problems. She was last seen 6 weeks ago and placed on Flonase which she states did provide mild improvement. She states she still hears music in her ears at night. She denies any pain or pressure. She denies any drainage. She denies any other tinnitus. He does complain of mild noticed hearing loss bilaterally. Past Medical History:   Diagnosis Date    Anxiety     Arthritis     Atrial fibrillation ([address]     CAD (coronary artery disease)     Chronic kidney disease     STAGE 4    [facility]     History of cardiovascular stress test 2018    Lexiscan stress test    History of echocardiogram 2016    EF 40-45%    Hyperlipidemia     Hypertension     Influenza B     Microcytic anemia     Neuropathy     Pulmonary valve insufficiency      Past Surgical History:   Procedure Laterality Date    CHOLECYSTECTOMY, LAPAROSCOPIC      HYSTERECTOMY      RHINOPLASTY         Current Outpatient Medications:     bumetanide (BUMEX) 1 MG tablet, Take 1 tablet by mouth daily Breakfast, Disp: 90 tablet, Rfl: 1    spironolactone (ALDACTONE) 25 MG tablet, Take 0.5 tablets by mouth daily, Disp: 45 tablet, Rfl: 1    simvastatin (ZOCOR) 40 MG tablet, TAKE 1 TABLET BY MOUTH EVERY NIGHT, Disp: 90 tablet, Rfl: 1    traMADol (ULTRAM) 50 MG tablet, Take 1 tablet by mouth every 12 hours as needed for Pain for up to 30 days. , Disp: 60 tablet, Rfl: 0    fluticasone (FLONASE) 50 MCG/ACT nasal spray, SHAKE LIQUID AND USE 2 SPRAYS IN EACH NOSTRIL DAILY, Disp: 48 g, Rfl: 3   sacubitril-valsartan (ENTRESTO) 24-26 MG per tablet, Take 0.5 tablets by mouth 2 times daily, Disp: 28 tablet, Rfl: 3    carvedilol (COREG) 3.125 MG tablet, Take 1 tablet by mouth 2 times daily, Disp: 180 tablet, Rfl: 1    ELIQUIS 2.5 MG TABS tablet, Take 1 tablet by mouth 2 times daily, Disp: 60 tablet, Rfl: 5    vitamin D (CHOLECALCIFEROL) 1000 UNIT TABS tablet, Take 1 tablet by mouth daily, Disp: 60 tablet, Rfl: 1    docusate sodium (COLACE) 50 MG capsule, Take 1 capsule by mouth 2 times daily as needed for Constipation, Disp: 30 capsule, Rfl: 0  Patient has no known allergies. Social History     Tobacco Use    Smoking status: Never Smoker    Smokeless tobacco: Never Used   Substance Use Topics    Alcohol use: Not Currently    Drug use: Never     Family History   Problem Relation Age of Onset    Cancer Mother         uterus cancer    Heart Attack Father         at 68    Cancer Sister         breast cancer    Other Other         Sepsis       Review of Systems   Constitutional: Negative. Negative for activity change and appetite change. HENT: Positive for hearing loss. Patient states she hears music at night. Eyes: Negative. Respiratory: Negative. Negative for shortness of breath and stridor. Cardiovascular: Negative. Negative for chest pain and palpitations. Gastrointestinal: Negative. Negative for abdominal pain. Endocrine: Negative. Genitourinary: Negative. Musculoskeletal: Negative. Skin: Negative. Neurological: Negative. Negative for dizziness. Hematological: Negative. Psychiatric/Behavioral: Negative. Ht 4' 7\" (1.397 m)   Wt 113 lb (51.3 kg)   BMI 26.26 kg/m²   Physical Exam  Constitutional:       Appearance: Normal appearance. HENT:      Head: Normocephalic. Right Ear: Ear canal and external ear normal. Decreased hearing noted. A middle ear effusion is present. Tympanic membrane has decreased mobility. Saul Sales is seen today for follow-up of chronic middle ear effusions and sensation of music in her ears. Upon exam bilateral TMs appear to show sign of mild middle ear effusion. There is no erythema or edema of either ear canal.  Sinuses are patent without rhinorrhea or postnasal drainage. At this time is discussed with the patient and her family that the music she is hearing is most likely an auditory hallucination and should be addressed further by her PCP. She is instructed that she may continue with her Flonase for chronic middle ear effusions and states that at this time she would like no further treatment for her ears. She will follow-up as needed. She is instructed to call with any new or worsening symptoms.     Karen Rosas, MSN, FNP-C  [facility], Nose and Throat    The information contained in this note has been dictated using drug and medical speech recognition software and may contain errors

## 2021-03-03 PROBLEM — E55.9 VITAMIN D DEFICIENCY: Status: ACTIVE | Noted: 2021-01-01

## 2021-06-14 NOTE — PROGRESS NOTES
(CHOLECALCIFEROL) 1000 UNIT TABS tablet Take 1 tablet by mouth daily 60 tablet 1    docusate sodium (COLACE) 50 MG capsule Take 1 capsule by mouth 2 times daily as needed for Constipation 30 capsule 0     No current facility-administered medications for this visit. Allergies as of 06/15/2021    (No Known Allergies)       Social History     Socioeconomic History    Marital status:      Spouse name: Not on file    Number of children: 2    Years of education: Not on file    Highest education level: Not on file   Occupational History    Occupation: house wife   Tobacco Use    Smoking status: Never Smoker    Smokeless tobacco: Never Used   Vaping Use    Vaping Use: Never used   Substance and Sexual Activity    Alcohol use: Not Currently    Drug use: Never    Sexual activity: Not on file   Other Topics Concern    Not on file   Social History Narrative    She [place] son in [place], [place]. Drinks occ hot tea     Social Determinants of Health     Financial Resource Strain:     Difficulty of Paying Living Expenses:    Food Insecurity:     Worried About Running Out of Food in the Last Year:     [address] in the Last Year:    Transportation Needs:     Lack of Transportation (Medical):      Lack of Transportation (Non-Medical):    Physical Activity:     Days of Exercise per Week:     Minutes of Exercise per Session:    Stress:     Feeling of Stress :    Social Connections:     Frequency of Communication with Friends and Family:     Frequency of Social Gatherings with Friends and Family:     Attends Jehovah's witness Services:     Active Member of Clubs or Organizations:     Attends Club or Organization Meetings:     Marital Status:    Intimate Partner Violence:     Fear of Current or Ex-Partner:     Emotionally Abused:     Physically Abused:     Sexually Abused:        Family History   Problem Relation Age of Onset    Cancer Mother         uterus cancer    Heart Attack Father         at 68    Cancer Sister         breast cancer    Other Other         Sepsis       REVIEW OF SYSTEMS:     CONSTITUTIONAL:  negative for  fevers, chills, sweats, + fatigue  HEENT:  negative for  tinnitus, earaches, nasal congestion and epistaxis  RESPIRATORY:  negative for  dry cough, cough with sputum, wheezing and hemoptysis  GASTROINTESTINAL:  negative for nausea, vomiting, diarrhea, constipation, pruritus and jaundice  HEMATOLOGIC/LYMPHATIC:  negative for easy bruising, bleeding, lymphadenopathy and petechiae  ENDOCRINE:  negative for heat intolerance, cold intolerance, tremor, hair loss and diabetic symptoms including neither polyuria nor polydipsia nor blurred vision  MUSCULOSKELETAL:  negative for  myalgias, arthralgias, joint swelling, stiff joints and decreased range of motion  NEUROLOGICAL:  negative for memory problems, speech problems, visual disturbance, dysphagia, weakness and numbness      PHYSICAL EXAM:   CONSTITUTIONAL:  awake, alert, cooperative, no apparent distress, and appears stated age  HEAD:  normocepalic, without obvious abnormality, atraumatic  NECK:  Supple, symmetrical, trachea midline, no adenopathy, thyroid symmetric, not enlarged and no tenderness, skin normal  LUNGS:  No increased work of breathing, good air exchange, clear to auscultation bilaterally, no crackles or wheezing  CARDIOVASCULAR:  Normal apical impulse, irregularly irregular, normal S1 and S2, no S3, 3/6 systolic murmur at the apex, 3/6 systolic murmur at the left lower sternal border, 3/6 diastolic murmur at the right upper sternal border, no edema, no JVD, no carotid bruit. ABDOMEN:  Soft, nontender, no masses, no hepatomegaly, no splenomegaly, BS+  MUSCULOSKELETAL:  No clubbing no cyanosis. there is no redness, warmth, or swelling of the joints  full range of motion noted  NEUROLOGIC:  Alert, awake,oriented x3  SKIN:  no bruising or bleeding, normal skin color, texture, turgor and no redness, warmth, or swelling    Blood pressure (!) 94/56, pulse 70, resp. rate 20, height 4' 7\" (1.397 m), weight 113 lb (51.3 kg), not currently breastfeeding. DATA:  I personally reviewed the admission EKG with the following interpretation: Atrial fibrillation, controlled ventricular response, low voltage QRS, poor R wave progressions, nonspecific T wave changes, no change when compared to previous    EKG -9/24/20 Atrial fibrillation, with a controlled ventricular response, low QRS voltage, poor R wave progression, nonspecific T wave changes in the lateral leads    ECHO: 3/26/18   Compared to prior echo, no significant changes noted. Technically adequate   study. Normal left ventricular wall thickness. Ejection fraction is visually estimated at 20%. moderate global wall hypokinesis   Indeterminate diastolic function. No evidence of left ventricular mass or thrombus noted. Moderate to severe mitral regurgitation is present. Mild mitral stenosis. Mild aortic regurgitation is noted. Moderate tricuspid regurgitation. RVSP is 35 mmHg. Pulmonary hypertension is mild . There is a small pericardial effusion noted. Stress Test: 3/28/18   Findings: There is global hypokinesis. No focal defects are identified.       The gated left ventricular ejection fraction is 34 %.              Angiography:   Cardiology Labs:   BMP:    Lab Results   Component Value Date     04/27/2021    K 5.2 04/27/2021     04/27/2021    CO2 21 04/27/2021    BUN 53 04/27/2021     CMP:    Lab Results   Component Value Date     04/27/2021    K 5.2 04/27/2021     04/27/2021    CO2 21 04/27/2021    BUN 53 04/27/2021    PROT 6.8 04/27/2021     CBC:    Lab Results   Component Value Date    WBC 7.0 04/27/2021    RBC 3.73 04/27/2021    HGB 10.6 04/27/2021    HCT 34.7 04/27/2021    MCV 93.0 04/27/2021    RDW 14.1 04/27/2021     04/27/2021     PT/INR:  No results found for: PTINR  PT/INR Warfarin:  No components found for: Imelda Diehl  PTT:    Lab Results   Component Value Date    APTT 34.3 06/27/2016     PTT Heparin:  No components found for: APTTHEP  Magnesium:    Lab Results   Component Value Date    MG 2.3 03/24/2018     TSH:    Lab Results   Component Value Date    TSH 1.210 08/27/2020     TROPONIN:  No components found for: TROP  BNP:  No results found for: BNP  FASTING LIPID PANEL:    Lab Results   Component Value Date    CHOL 118 04/27/2021    HDL 55 04/27/2021    TRIG 90 04/27/2021     No orders to display     I have personally reviewed the laboratory, cardiac diagnostic and radiographic testing as outlined above:      IMPRESSION:  1. Chronic systolic (congestive) heart failure: Compensated. will continue current treatment          2. Chronic atrial fibrillation: Controlled ventricular response, we will continue current treatment, will continue chronic anticoagulation with Eliquis             3.  Nonrheumatic pulmonary valve insufficiency            4.  Nonrheumatic mitral (valve) insufficiency:  Moderate                5.  Nonrheumatic tricuspid (valve) insufficiency :  Moderate               6.  Pulmonary hypertension, unspecified :  Mild             7.  Hyperlipidemia, unspecified            8.  Hypotension: Will adjust medications  9. Chronic kidney disease, stage 4 (severe)      10. Long term (current) use of anticoagulants        11. Hyperkalemia: Will discontinue Aldactone              RECOMMENDATIONS:   1. Will discontinue Aldactone  2. Take an extra half of Bumex on Mondays and Fridays  3. Continue the rest of treatment continue current treatment  4. CHF: Daily weight, take an extra Bumex for weight gain of more than 2-3 pounds in 24 hours, compliance with diuretics, low-salt diet were all advised.   5.  Increase risk of bleeding due to being on anti-coagulation, symptoms and signs of bleeding discussed with patient, patient was advised to seek medical attention at the earliest symptoms or signs of bleeding. 6.  Follow-up with  as scheduled  7. Follow-up with Dr. Adrian Childers in 6 months, sooner if symptomatic for any reason      I have reviewed my findings and recommendations with patient and her daughter at bedside    Electronically signed by Mechelle Khoury MD on 6/15/2021 at 10:23 AM    NOTE: This report was transcribed using voice recognition software.  Every effort was made to ensure accuracy; however, inadvertent computerized transcription errors may be present

## 2021-07-26 NOTE — TELEPHONE ENCOUNTER
Patient's weight is up to #125 - was at #113 on June 13th when she seen you last.  She has a lot of swelling in her legs with drainage and shortness of breath.   She refused to eat her lunch today

## 2021-07-30 NOTE — TELEPHONE ENCOUNTER
Patient lost 5 pounds since increasing Bumex.   Her legs are still draining but swelling has come down a bit same name as above

## 2021-08-02 NOTE — TELEPHONE ENCOUNTER
Daughter called in - she has lost another 6 pounds weight down to #116. She still has the swelling with drainage though.   I did advise them to continue today since you were out and I would get further orders and speak to them tomorrow

## 2021-08-05 NOTE — TELEPHONE ENCOUNTER
Weight is now down to #110 which is her baseline. Her legs are still draining, but with very little swelling - looks so much better per family.   Yesterday, she had left sided chest pain and refused to go to an urgent care center - today the pain is minimal

## 2021-08-09 NOTE — TELEPHONE ENCOUNTER
I am not sure what is the confusions about, she can go back to her old diuretic regimen, maybe they should go to PCP and get that left leg checked

## 2021-08-09 NOTE — TELEPHONE ENCOUNTER
Family called in stating right leg is almost normal still draining and left leg is just a little swollen and draining. Weight today is at #110 did go up to #111 for a couple of days.   She is very confused

## 2021-08-24 NOTE — TELEPHONE ENCOUNTER
Checked on patient per your request - her weight is up to #115 - it's fluctuating. Her legs have stopped draining - swelling is better but not where they used to be. Overall she is 100% better per family.   She is taking Bumex 1mg daily

## 2021-08-25 NOTE — TELEPHONE ENCOUNTER
We do please ask him to increase her Bumex to twice a day for the next 3 days and then back to once a day, she needs basic metabolic panel early next week if she did not have 1 recently

## 2021-08-27 NOTE — TELEPHONE ENCOUNTER
Family called in - they have given her the Bumex 2mg for the last 3 days. There has been no change with her weight or the little bit of swelling that she has. They are going to have her labs performed on Monday.   Are you wanting her to remain on 2mg or go back to normal schedule

## 2021-08-31 NOTE — TELEPHONE ENCOUNTER
Advised patient to go back to normal dose of Bumex. She just seen Dr Maxime Tejada last week - that's when she had the 2+ pitting edema.   She will see you on September 10th in  [employer] [facility]

## 2021-09-09 NOTE — PROGRESS NOTES
Cardiology Progress Note  Dr. Juliann Maya      Referring Physician: Maryam Hollingsworth:   Chief Complaint   Patient presents with    Atrial Fibrillation     Check per patient due to edema bilaterally lower legs and they are seeping. HISTORY OF PRESENT ILLNESS:   Patient is 80years old lady atrial fibrillation, nonischemic cardiomyopathy, congestive heart failure, patient is here for follow-up appointment. Bilateral leg edema, left more than right, skin breakdown, seeping, has been going on for the last few weeks, some improvement with diuretics, however the left leg is significantly more swollen than the right, recent negative ultrasound for DVT, has popliteal cyst, shortness of breath is at baseline, functional capacity is very limited but his weight has been, denies any chest pain, no palpitations, no pedal edema, no PND, no orthopnea, no syncope, no presyncopal episodes.       Past Medical History:   Diagnosis Date    Anxiety     Arthritis     Atrial fibrillation ([facility] [address]     CAD (coronary artery disease)     Chronic kidney disease     STAGE 4    [facility]est[facility] ([facility]) ([facility])     History of cardiovascular stress test 03/28/2018    Lexiscan stress test    History of echocardiogram 09/30/2016    EF 40-45%    Hyperlipidemia     Hypertension     Influenza B     Microcytic anemia     Neuropathy     Pulmonary valve insufficiency          Past Surgical History:   Procedure Laterality Date    CHOLECYSTECTOMY, LAPAROSCOPIC      HYSTERECTOMY      RHINOPLASTY           Current Outpatient Medications   Medication Sig Dispense Refill    metOLazone (ZAROXOLYN) 5 MG tablet Take 1 tablet by mouth See Admin Instructions Take one by mouth for 3 days, then as needed for swelling not responding to Bumex 30 tablet 0    sacubitril-valsartan (ENTRESTO) 24-26 MG per tablet TAKE 1/2 TABLET BY MOUTH TWICE DAILY 30 tablet 5    ELIQUIS 2.5 MG TABS tablet TAKE 1 TABLET BY MOUTH TWICE DAILY 60 tablet 5    traMADol (ULTRAM) 50 MG tablet Take 1 tablet by mouth every 12 hours as needed for Pain for up to 30 days. 60 tablet 0    bumetanide (BUMEX) 1 MG tablet Take 1 tablet by mouth 2 times daily Breakfast (Patient taking differently: Take 1 mg by mouth daily Breakfast) 180 tablet 1    simvastatin (ZOCOR) 40 MG tablet TAKE 1 TABLET BY MOUTH EVERY NIGHT 90 tablet 1    Compression Stockings MISC Wear daily (20-30mmHg) 1 each 0    carvedilol (COREG) 3.125 MG tablet Take 1 tablet by mouth 2 times daily 180 tablet 1    vitamin D (CHOLECALCIFEROL) 1000 UNIT TABS tablet Take 1 tablet by mouth daily 60 tablet 1    docusate sodium (COLACE) 50 MG capsule Take 1 capsule by mouth 2 times daily as needed for Constipation 30 capsule 0     No current facility-administered medications for this visit. Allergies as of 09/10/2021    (No Known Allergies)       Social History     Socioeconomic History    Marital status:      Spouse name: Not on file    Number of children: 2    Years of education: Not on file    Highest education level: Not on file   Occupational History    Occupation: house wife   Tobacco Use    Smoking status: Never Smoker    Smokeless tobacco: Never Used   Vaping Use    Vaping Use: Never used   Substance and Sexual Activity    Alcohol use: Not Currently    Drug use: Never    Sexual activity: Not on file   Other Topics Concern    Not on file   Social History Narrative    She lives with her son in [place], [place]. Drinks occ hot tea     Social Determinants of Health     Financial Resource Strain: Low Risk     Difficulty of Paying Living Expenses: Not hard at all   Food Insecurity: No Food Insecurity    Worried About Running Out of Food in the Last Year: Never true    Arturo of Food in the Last Year: Never true   Transportation Needs:     Lack of Transportation (Medical):      Lack of Transportation (Non-Medical):    Physical Activity:     Days of Exercise per Week:     Minutes of Exercise per Session:    Stress:     Feeling of Stress :    Social Connections:     Frequency of Communication with Friends and Family:     Frequency of Social Gatherings with Friends and Family:     Attends Ten[employer] Services:     Active Member of Clubs or Organizations:     Attends Club or Organization Meetings:     Marital Status:    Intimate Partner Violence:     Fear of Current or Ex-Partner:     Emotionally Abused:     Physically Abused:     Sexually Abused:        Family History   Problem Relation Age of Onset    Cancer Mother         uterus cancer    Heart Attack Father         at 68    Cancer Sister         breast cancer    Other Other         Sepsis       REVIEW OF SYSTEMS:     CONSTITUTIONAL:  negative for  fevers, chills, sweats, + fatigue  HEENT:  negative for  tinnitus, earaches, nasal congestion and epistaxis  RESPIRATORY:  negative for  dry cough, cough with sputum, wheezing and hemoptysis  GASTROINTESTINAL:  negative for nausea, vomiting, diarrhea, constipation, pruritus and jaundice  HEMATOLOGIC/LYMPHATIC:  negative for easy bruising, bleeding, lymphadenopathy and petechiae  ENDOCRINE:  negative for heat intolerance, cold intolerance, tremor, hair loss and diabetic symptoms including neither polyuria nor polydipsia nor blurred vision  MUSCULOSKELETAL:  negative for  myalgias, arthralgias, joint swelling, stiff joints and decreased range of motion  NEUROLOGICAL:  negative for memory problems, speech problems, visual disturbance, dysphagia, weakness and numbness      PHYSICAL EXAM:   CONSTITUTIONAL:  awake, alert, cooperative, no apparent distress, and appears stated age  HEAD:  normocepalic, without obvious abnormality, atraumatic  NECK:  Supple, symmetrical, trachea midline, no adenopathy, thyroid symmetric, not enlarged and no tenderness, skin normal  LUNGS:  No increased work of breathing, good air exchange, clear to auscultation bilaterally, no crackles or 09/10/2021    BUN 98 09/10/2021     CMP:    Lab Results   Component Value Date     09/10/2021    K 5.3 09/10/2021     09/10/2021    CO2 20 09/10/2021    BUN 98 09/10/2021    PROT 5.9 08/05/2021     CBC:    Lab Results   Component Value Date    WBC 5.9 08/05/2021    RBC 4.26 08/05/2021    HGB 11.1 08/05/2021    HCT 37.5 08/05/2021    MCV 88.0 08/05/2021    RDW 18.6 08/05/2021     08/05/2021     PT/INR:  No results found for: PTINR  PT/INR Warfarin:  No components found for: PTPATWAR, PTINRWAR  PTT:    Lab Results   Component Value Date    APTT 34.3 06/27/2016     PTT Heparin:  No components found for: APTTHEP  Magnesium:    Lab Results   Component Value Date    MG 2.3 03/24/2018     TSH:    Lab Results   Component Value Date    TSH 1.210 08/27/2020     TROPONIN:  No components found for: TROP  BNP:  No results found for: BNP  FASTING LIPID PANEL:    Lab Results   Component Value Date    CHOL 118 04/27/2021    HDL 55 04/27/2021    TRIG 90 04/27/2021     No orders to display     I have personally reviewed the laboratory, cardiac diagnostic and radiographic testing as outlined above:      IMPRESSION:  1. Acute on chronic systolic congestive heart failure: Decompensated, with a worsening kidney function, will need to increase diuretic therapy, will check basic metabolic panel and BNP, Aldactone still on hold due to hypokalemia, risk of worsening kidney function discussed with patient and her daughter at bedside  2.   Chronic atrial fibrillation: Controlled ventricular response, we will continue current treatment, will continue chronic anticoagulation with Eliquis             3.  Nonrheumatic pulmonary valve insufficiency            4.  Nonrheumatic mitral (valve) insufficiency:  Moderate                5.  Nonrheumatic tricuspid (valve) insufficiency :  Moderate               6.  Pulmonary hypertension, unspecified :  Mild             7.  Hyperlipidemia, unspecified            8.  Hypotension: Will adjust medications  9. Chronic kidney disease, stage 4 (severe): Worsening      10. Long term (current) use of anticoagulants        11. Hyperkalemia: Will discontinue Aldactone              RECOMMENDATIONS:   1. Metabolic panel and BNP  2. Metolazone 5 mg 1 by mouth daily for the next 3 days, patient is to call with progress on Monday  3. Continue the rest of medications  4. Basic metabolic panel next week  5. Patient was strongly advised to call 911 if symptoms recur or get worse for any reason  6. Follow-up with  as scheduled  7. Follow-up with Dr. Jennifer Hernandez in 1 month, sooner if symptomatic for any reason      I have reviewed my findings and recommendations with patient and her daughter at bedside    Electronically signed by Thelma Johnson MD on 9/12/2021 at 9:47 AM    NOTE: This report was transcribed using voice recognition software.  Every effort was made to ensure accuracy; however, inadvertent computerized transcription errors may be present

## 2021-09-13 NOTE — TELEPHONE ENCOUNTER
Family called in per your request from being seen on Friday - she has lost 3 pounds, leg is still draining and still swollen    They are asking when would you like labs drawn - and when did you want to see her again?   Office note states 1 month - office staff states 6 months

## 2021-09-14 NOTE — TELEPHONE ENCOUNTER
Continue metolazone for the next 3 days, basic metabolic panel on ThursdayI will see back in a month

## 2021-09-16 NOTE — TELEPHONE ENCOUNTER
Lab called today due to critical bun at 109.5 - I did speak to her family and advised them to have her drink more that she is dehydrated. Today was the last day for the metolazone - which I also advised to hold for now until we get further orders from you. She did loose 2 more pounds and is back at her baseline, left leg swelling has come down a bit, but still has a lot of draining in both legs.   Please advise

## 2021-09-17 NOTE — TELEPHONE ENCOUNTER
Let us hold metolazone, like what you said she needs to drink more fluids, will check basic metabolic panel on Monday

## 2021-09-29 NOTE — TELEPHONE ENCOUNTER
How she taking her diuretics?, I really believe she needs to be evaluated by nephrology, they should contact Dr. Yoo Rater

## 2021-09-30 NOTE — CARE COORDINATION
SW Consult. Pt here for AMS and it's noted she has Multi-System Organ Failure. Hospice referral is needed. DAVIN spoke to pt and pts DIL Walter Caceres. It's best to reach Sathishrhiannon @ [phone]. Pt is A&O x2 & this is her basline. She is also [place]. Tammy noted pt lived in [place] and came to live w/her and her  in 2016. About a month ago, HD was discussed for pt but she declined- she did not want that. Overall, she was doing well except kidney issues. Pt was independent and could be left alone w/Nereyda's 79 yo mother. Pt is now more confused, disoriented and self care abilities failing. This decline started about 3 weeks ago. Pt sleeping much of the day and not oriented and getting around as she use to. This has been rapid decline for pt. Pt does did not talk as DAVIN and Tammy speaking. Pt singed DNR/CC order today and SW talked about hospice referral and list provided. Tammy notes pt had [facility] in past [facility]. [facility] but would like initial referral made. PCP is Tim Sharpe. Pt has following DME: Seated walker w/wheels. There is POA in chart naming pt's DIL Ching Cole as agnet and pt's son David Boyd as Alt. DAVIN called [address] [facility] service will have on-call Rn call Esau Bob  SW receives call from [place] [phone] on-call for hospice. SW completed referral. Hospice will contact [facility] in am to complete intake. Packet to be faxed tonight to [phone]. DAVIN informed pt and [facility] of POC and to be d/c home. Also updated Dr. Shobha Frank MD of [facility] and need for him to complete note for hospice referral.   DAVIN prepared packet to be faxed including POA, DNR, hospice order, face sheet. Only thing missing is Dr. Shobha Frank MD note. Gave packet to  and she will have Micki Jeong [phone] fax once Doc note com,pleted.   Electronically signed by ALEXANDRIA Quintana on 9/30/2021 at 6:56 PM

## 2021-10-01 NOTE — ED PROVIDER NOTES
[address] ENCOUNTER      Pt Name: Bill Lee  MRN: [id number]  [place] 4/20/1931  Date of evaluation: 9/30/2021      CHIEF COMPLAINT       Chief Complaint   Patient presents with    Other     Sent in from [address] [place] [address] office for hypotension and leg swelling    Altered Mental Status        HPI  Bill Lee is a 80 y.o. female history of chronic kidney disease presents from her primary care physician's office for hypotension and leg swelling. Patient has been recommended to go to dialysis multiple times. States that she does not want to. States that she does have bilateral leg swelling and she has no other symptoms. No alleviating or exacerbating factors. Not taking any medication. Alleviate her symptoms. Denies any recent fevers, chills, nausea, vomiting, chest pain, shortness of breath, abdominal pain, flank pain, dysuria, hematuria, diarrhea, constipation, new rashes or sores. Except as noted above the remainder of the review of systems was reviewed and negative. Review of Systems   Constitutional: Negative for chills and fever. HENT: Negative for ear pain, sinus pressure and sore throat. Eyes: Negative for visual disturbance. Respiratory: Negative for cough, shortness of breath and wheezing. Cardiovascular: Positive for leg swelling. Negative for chest pain and palpitations. Gastrointestinal: Negative for abdominal distention, abdominal pain, constipation, diarrhea, nausea and vomiting. Endocrine: Negative for polyuria. Genitourinary: Negative for dysuria and frequency. Musculoskeletal: Negative for arthralgias and back pain. Skin: Negative for color change, pallor, rash and wound. Neurological: Negative for weakness and headaches. Hematological: Negative for adenopathy. Psychiatric/Behavioral: Negative for agitation. All other systems reviewed and are negative.        Physical Exam  Vitals and nursing note reviewed. Constitutional:       General: She is not in acute distress. Appearance: Normal appearance. She is well-developed. She is not ill-appearing or diaphoretic. HENT:      Head: Normocephalic and atraumatic. Nose: Nose normal. No congestion or rhinorrhea. Mouth/Throat:      Mouth: Mucous membranes are moist.      Pharynx: Oropharynx is clear. No oropharyngeal exudate or posterior oropharyngeal erythema. Eyes:      Extraocular Movements: Extraocular movements intact. Pupils: Pupils are equal, round, and reactive to light. Cardiovascular:      Rate and Rhythm: Normal rate and regular rhythm. Pulses: Normal pulses. Heart sounds: Normal heart sounds. Pulmonary:      Effort: Pulmonary effort is normal. No respiratory distress. Breath sounds: Normal breath sounds. No stridor. No wheezing, rhonchi or rales. Chest:      Chest wall: No tenderness. Abdominal:      General: Abdomen is flat. There is no distension. Palpations: Abdomen is soft. Tenderness: There is no abdominal tenderness. Musculoskeletal:         General: No swelling or tenderness. Normal range of motion. Cervical back: Normal range of motion. Right lower leg: No edema. Left lower leg: No edema. Skin:     General: Skin is warm. Capillary Refill: Capillary refill takes less than 2 seconds. Findings: Rash present. Neurological:      General: No focal deficit present. Mental Status: She is alert. Mental status is at baseline. GCS: GCS eye subscore is 4. GCS verbal subscore is 5. GCS motor subscore is 6. Cranial Nerves: No cranial nerve deficit. Comments: Alert and oriented x2. Patient's baseline. Patient's daughter-in-law at bedside.    Psychiatric:         Mood and Affect: Mood normal.          Procedures     MDM  Number of Diagnoses or Management Options  Acute renal failure, unspecified acute renal failure type [facility])  DNR (do not resuscitate) discussion  Diagnosis management comments: 5year-old female with a history of chronic kidney disease, who has refused dialysis multiple times in the past presents from her nephrologist office for evaluation for hypotension and elevated creatinine. Vitals significant for hypotension and bradycardia. She has bilateral 1+ pitting edema up to her proximal tibias bilaterally. Lungs are clear to auscultation bilaterally no wheeze rales rhonchi. Normal S1-S2. Abdomen soft nontender no rebound or guarding. Diagnostic labs and imaging interpreted and reviewed. Patient has CKD with an elevated creatinine. She does have an elevated BNP as well. Chest x-ray negative for any acute process. Patient states that she does not want dialysis, advance care, to be on a ventilator or have any chest compressions. She is requesting hospice care. She is awake, alert, and oriented x2. This is the patient's baseline per patient's daughter-in-law who is at bedside with her. Social work has been consulted. Patient is placed on DNR CC by me. Patient given information for hospice care outside. Patient agreeable plan to be discharged home. Amount and/or Complexity of Data Reviewed  Decide to obtain previous medical records or to obtain history from someone other than the patient: yes           Patient is awake alert, hemodynamic stable, afebrile and in no respiratory distress. Discussed with patient plan for close outpatient follow-up with the patient's PCP as well as return precautions and the patient understands and agrees to the plan. Patient was seen with attending. ED Course as of Oct 03 0624   Thu Sep 30, 2021   1540 EKG read interpreted by me. [JV]   80 Spoke with patient at bedside as well as her daughter-in-law. States that she does not want dialysis. She does not want any advanced care including intubation or chest compressions.   Patient wants to be placed DNR CC. Blood   Result Value Ref Range    Culture, Blood 2 24 Hours no growth    CBC Auto Differential   Result Value Ref Range    WBC 6.6 4.5 - 11.5 E9/L    RBC 3.98 3.50 - 5.50 E12/L    Hemoglobin 10.3 (L) 11.5 - 15.5 g/dL    Hematocrit 33.1 (L) 34.0 - 48.0 %    MCV 83.2 80.0 - 99.9 fL    MCH 25.9 (L) 26.0 - 35.0 pg    MCHC 31.1 (L) 32.0 - 34.5 %    RDW 19.2 (H) 11.5 - 15.0 fL    Platelets 390 287 - 875 E9/L    MPV NOT CALC 7.0 - 12.0 fL    Neutrophils % 85.7 (H) 43.0 - 80.0 %    Immature Granulocytes % 0.9 0.0 - 5.0 %    Lymphocytes % 6.0 (L) 20.0 - 42.0 %    Monocytes % 6.0 2.0 - 12.0 %    Eosinophils % 1.2 0.0 - 6.0 %    Basophils % 0.2 0.0 - 2.0 %    Neutrophils Absolute 5.68 1.80 - 7.30 E9/L    Immature Granulocytes # 0.06 E9/L    Lymphocytes Absolute 0.40 (L) 1.50 - 4.00 E9/L    Monocytes Absolute 0.40 0.10 - 0.95 E9/L    Eosinophils Absolute 0.08 0.05 - 0.50 E9/L    Basophils Absolute 0.01 0.00 - 0.20 E9/L    Anisocytosis 2+     Polychromasia 1+     Hypochromia 1+     Poikilocytes . 1+     [place] Cells 1+     Ovalocytes 1+    Comprehensive Metabolic Panel   Result Value Ref Range    Sodium 128 (L) 132 - 146 mmol/L    Potassium 3.7 3.5 - 5.0 mmol/L    Chloride 92 (L) 98 - 107 mmol/L    CO2 21 (L) 22 - 29 mmol/L    Anion Gap 15 7 - 16 mmol/L    Glucose 119 (H) 74 - 99 mg/dL     (HH) 6 - 23 mg/dL    CREATININE 2.7 (H) 0.5 - 1.0 mg/dL    GFR Non-African American 17 >=60 mL/min/1.73    GFR African American 20     Calcium 9.0 8.6 - 10.2 mg/dL    Total Protein 5.7 (L) 6.4 - 8.3 g/dL    Albumin 3.3 (L) 3.5 - 5.2 g/dL    Total Bilirubin 0.7 0.0 - 1.2 mg/dL    Alkaline Phosphatase 85 35 - 104 U/L    ALT 15 0 - 32 U/L    AST 17 0 - 31 U/L   Magnesium   Result Value Ref Range    Magnesium 2.1 1.6 - 2.6 mg/dL   Troponin   Result Value Ref Range    Troponin, High Sensitivity 74 (H) 0 - 9 ng/L   Protime-INR   Result Value Ref Range    Protime 42.1 (H) 9.3 - 12.4 sec    INR 3.6    APTT   Result Value Ref Range    aPTT 35.0 24.5 - 35.1 sec   Urinalysis, reflex to microscopic   Result Value Ref Range    Color, UA Yellow Straw/Yellow    Clarity, UA Clear Clear    Glucose, Ur Negative Negative mg/dL    Bilirubin Urine Negative Negative    Ketones, Urine TRACE (A) Negative mg/dL    Specific Gravity, UA 1.015 1.005 - 1.030    Blood, Urine TRACE (A) Negative    pH, UA 5.0 5.0 - 9.0    Protein, UA Negative Negative mg/dL    Urobilinogen, Urine 0.2 <2.0 E.U./dL    Nitrite, Urine Negative Negative    Leukocyte Esterase, Urine Negative Negative   Lactate, Sepsis   Result Value Ref Range    Lactic Acid, Sepsis 2.0 (H) 0.5 - 1.9 mmol/L   Lactate, Sepsis   Result Value Ref Range    Lactic Acid, Sepsis 1.8 0.5 - 1.9 mmol/L   Brain Natriuretic Peptide   Result Value Ref Range    Pro-BNP 44,920 (H) 0 - 450 pg/mL   Microscopic Urinalysis   Result Value Ref Range    WBC, UA 0-1 0 - 5 /HPF    RBC, UA 0-1 0 - 2 /HPF    Epithelial Cells, UA MODERATE /HPF    Bacteria, UA RARE (A) None Seen /HPF   POCT Glucose   Result Value Ref Range    Glucose 113 mg/dL    QC OK? yes    POCT Glucose   Result Value Ref Range    Meter Glucose 113 (H) 74 - 99 mg/dL   EKG 12 Lead   Result Value Ref Range    Ventricular Rate 156 BPM    Atrial Rate 79 BPM    QRS Duration 10 ms    Q-T Interval 244 ms    QTc Calculation (Bazett) 393 ms    R Axis 120 degrees    T Axis 101 degrees       ECG:  Please refer to workup tab for EKG read    Radiology:  CT Head WO Contrast   Final Result   1. No intracranial hemorrhage or acute intracranial disease identified. 2.  Generalized atrophy. White matter small vessel chronic ischemic change. 3.  Carotid and vertebrobasilar atherosclerotic calcifications.          XR CHEST 1 VIEW   Final Result   Findings suggestive of underlying CHF are similar to the previous exam.             ------------------------- NURSING NOTES AND VITALS REVIEWED ---------------------------  Date / Time Roomed:  9/30/2021  2:54 PM  ED Bed Assignment: 01/01    The nursing notes within the ED encounter and vital signs as below have been reviewed. BP (!) 92/52   Pulse 52   Temp 96.7 °F (35.9 °C)   Resp 16   Ht 4' 7\" (1.397 m)   Wt 109 lb (49.4 kg)   BMI 25.33 kg/m²   Oxygen Saturation Interpretation: normal      ------------------------------------------ PROGRESS NOTES ------------------------------------------    I have spoken with the patient and discussed todays results, in addition to providing specific details for the plan of care and counseling regarding the diagnosis and prognosis. Their questions are answered at this time and they are agreeable with the plan. I discussed at length with them reasons for immediate return here for re evaluation. They will followup with their PCP by calling their office tomorrow. --------------------------------- ADDITIONAL PROVIDER NOTES ---------------------------------  At this time the patient is without objective evidence of an acute process requiring hospitalization or inpatient management. They have remained hemodynamically stable throughout their entire ED visit and are stable for discharge with outpatient follow-up. The plan has been discussed in detail and they are aware of the specific conditions for emergent return, as well as the importance of follow-up. Discharge Medication List as of 9/30/2021  7:27 PM          Diagnosis:  1. DNR (do not resuscitate) discussion    2. Acute renal failure, unspecified acute renal failure type ([facility].)        Disposition:  Patient's disposition: Discharge to home  Patient's condition is stable.         Jia Collins MD  Resident  10/01/21 [id number]       Jai Collins MD  Resident  10/03/21 [id number]

## 2021-10-05 NOTE — TELEPHONE ENCOUNTER
Boom Dominguez is now in Hospice care. Family wanted to thank you so much for giving them 4 more years to spend with her.   They really appreciate you

## 2023-10-19 NOTE — TELEPHONE ENCOUNTER
"10/17/2023 RA  **Self Pay**  Does not have Acu benefits with [employer] # [id number]   [facility]  #  [id number]  Ref # [id number]    CC: Chronic Pain - Neck, Legs, Feet and Ankles    Initial Visit: 10/19/23    Pt. Is a 47y.o. who was referred to acupuncture by [facility] for the purpose of pain management.  Patient suffers from chronic pain in multiple locations dating back 21yrs.    Onset - Neck - \" a little over a year ago\" dx with buldging disc around C5 and C6 with one ruptured; fibro dx back in 2002 with first pregnancy, RA dx in 2008 or 2009    Location - neck, shoulders, scapula, hips, legs, feet and ankles     Duration - \"it's pretty manageable right now\" and mostly \"random\" based on how patient sleeps     Characteristics - neck - radiating, dull, deep, \"irritating nerve pain that can limit my motion\"; can be difficult to move when; RA - burning and inflamed worse in feet and radiates upwards towards the knee where it tends to subside    Alleviates - neck - some exercises, massage traction and some stretching; Feet - hot baths, strength training and stretching    Aggravates - neck - sleeping the wrong way or sudden movements including getting dressed, \"pulling up motion,\" bad posture; RA - eating the wrong foods such as inflammatory foods, standing for too long and weight management    Related sx - .      Current tx methods - Tramadol 1/day 50mg and Ibuprofen PRN    Pain severity - worse 10/10, can't move neck at all, RA 5-6/10; normal 3/10 generally in neck, RA 4-5/10.  Today, neck, 3/10, legs are 5/10    Feeling (emotions or fatigue) - feeling a bit stressed but okay overall    Other relevant:  Dx fibromyalgia  Dx RA    Goal for acupuncture treatment - \"pain reduction primarily in neck and legs so that work out better\"  " will continue her Bumex for now, I put an order for basic metabolic panel on Monday

## 2024-12-17 NOTE — TELEPHONE ENCOUNTER
I called and scheduled an appointment with the pt    I think she should get checked by Dr. Alexander Gray, and I will see her next time I am in [place]